# Patient Record
Sex: FEMALE | Race: ASIAN | Employment: PART TIME | URBAN - METROPOLITAN AREA
[De-identification: names, ages, dates, MRNs, and addresses within clinical notes are randomized per-mention and may not be internally consistent; named-entity substitution may affect disease eponyms.]

---

## 2022-07-12 ENCOUNTER — HOSPITAL ENCOUNTER (OUTPATIENT)
Dept: CT IMAGING | Age: 47
Discharge: HOME | End: 2022-07-15
Payer: COMMERCIAL

## 2022-07-12 ENCOUNTER — HOSPITAL ENCOUNTER (OUTPATIENT)
Dept: LAB | Age: 47
Discharge: HOME | End: 2022-07-15

## 2022-07-12 DIAGNOSIS — K63.9 DISEASE OF INTESTINE: ICD-10-CM

## 2022-07-12 PROCEDURE — 88305 TISSUE EXAM BY PATHOLOGIST: CPT

## 2022-07-12 PROCEDURE — 74177 CT ABD & PELVIS W/CONTRAST: CPT

## 2022-07-12 PROCEDURE — 6360000004 HC RX CONTRAST MEDICATION: Performed by: INTERNAL MEDICINE

## 2022-07-12 RX ADMIN — IOPAMIDOL 100 ML: 755 INJECTION, SOLUTION INTRAVENOUS at 11:08

## 2022-07-12 RX ADMIN — DIATRIZOATE MEGLUMINE AND DIATRIZOATE SODIUM 15 ML: 600; 100 SOLUTION ORAL; RECTAL at 11:08

## 2022-07-15 ENCOUNTER — OFFICE VISIT (OUTPATIENT)
Dept: SURGERY | Age: 47
End: 2022-07-15
Payer: COMMERCIAL

## 2022-07-15 VITALS
DIASTOLIC BLOOD PRESSURE: 68 MMHG | HEART RATE: 80 BPM | HEIGHT: 62 IN | OXYGEN SATURATION: 97 % | WEIGHT: 107 LBS | SYSTOLIC BLOOD PRESSURE: 128 MMHG | BODY MASS INDEX: 19.69 KG/M2

## 2022-07-15 DIAGNOSIS — C20 RECTAL CANCER (HCC): Primary | ICD-10-CM

## 2022-07-15 PROCEDURE — 99204 OFFICE O/P NEW MOD 45 MIN: CPT | Performed by: SURGERY

## 2022-07-16 NOTE — PROGRESS NOTES
[facility]  [address] [place] [address], [address] [place] [address]  [phone]     7/15/2022  Patient:  Eneida Bolivar  : 1975    [facility]  Eneida Bolivar is a 51 y.o. female who is seen for newly diagnosed rectal cancer. She developed rectal bleeding over last two months, which prompted her first colonoscopy. A 5 cm half circumferential friable mass was identified at the most proximal valve of [place], biopsy revealed at least carcinoma in situ. CT scan demonstrated no distal metastases. She has EUS scheduled for August. She feels ok overall. Slight weight loss. She has chronic left lower quadrant pain intermittently for years. She is otherwise healthy. Non smoker, non drinker. No cancer in family. Past surgery: C section. History reviewed. No pertinent past medical history. No current outpatient medications on file. No current facility-administered medications for this visit. Facility-Administered Medications Ordered in Other Visits   Medication Dose Route Frequency Provider Last Rate Last Admin    diatrizoate meglumine-sodium (GASTROGRAFIN) 66-10 % solution 15 mL  15 mL Oral ONCE PRN Inder Edmond MD   15 mL at 22 1108     Allergies   Allergen Reactions    Amoxicillin Rash     Past Surgical History:   Procedure Laterality Date     SECTION      COLONOSCOPY       History reviewed. No pertinent family history. Social History     Tobacco Use    Smoking status: Never    Smokeless tobacco: Never   Substance Use Topics    Alcohol use: Not on file        Review of Systems   A comprehensive review of systems was negative except for that written in the HPI.      Physical Exam  /68   Pulse 80   Ht 5' 2\" (1.575 m)   Wt 107 lb (48.5 kg)   SpO2 97%   BMI 19.57 kg/m²      General: Alert, oriented, cooperative, awake patient in no acute distress   Skin:  Warm, moist with good texture   Eyes:   Sclera are clear, extraocular muscles intact  HENT:  Normocephalic; oral mucosa moist, nares patent; neck is supple; trachea midline  Respiratory: Lungs clear to auscultation bilaterally, breathing is non-labored   Chest:  Symmetric throughout one respiratory excursion; no supraclavicular lymphadenopathy  CV:  Regular rate and rhythm, no appreciable murmurs, rubs, gallops  Abdomen: Soft, protuberant but non-distended; bowel sounds are normoactive   Extremities: No cyanosis, clubbing or edema  Neurological: No focal signs  Rectal exam:  JOY the lower edge of rectal mass can be felt, about 7-8 cm to anal verge. Labs:     Colonoscopy:        PATH:  A:  \"RECTAL MASS BIOPSIES\":        FRAGMENTS OF AT LEAST IN SITU ADENOCARCINOMA. SEE COMMENT. COMMENT: BECAUSE BIOPSY FRAGMENTS ARE LIMITED PRIMARIALY TO MUCOSA,   SUBMUCOSAL INFILTRATION CANNOT BE RULED OUT          B:  \"RANDOM COLON BIOPSIES\":        FRAGMENTS OF HISTOLOGICALLY UNREMARKABLE LARGE INTESTINE          C:  \"ASCENDING COLON POLYP\":        FRAGMENT OF MIXED TUBULOVILLOUS ADENOMA       CT:  CT Result (most recent):  CT CHEST ABDOMEN PELVIS W CONTRAST 07/12/2022    Narrative  CT CHEST, ABDOMEN, PELVIS WITH INTRAVENOUS CONTRAST. INDICATION: Abnormal colonoscopy. Left lower quadrant pain, diarrhea and rectal  bleeding. COMPARISON: None. TECHNIQUE:   5 mm axial scans from above the thoracic inlet to the pubic  symphysis following oral and 100 cc intravenous contrast without acute  complication. Intravenous contrast was given to increase the sensitivity to  neoplasia. Radiation dose reduction techniques were used for this study. Our CT  scanners use one or more of the following:  Automated exposure control,  adjustment of the mA and or kV according to patient size, iterative  reconstruction. FINDINGS:  Lungs: No pulmonary nodules. No airspace disease. Airways: Trachea and proximal bronchi grossly patent. Pleura: No effusion or thickening or calcifications.   Lymph Nodes: No enlarged axillary, hilar or mediastinal lymph nodes. Heart: Normal size. Coronaries: No definite calcifications. Aorta: Normal caliber. Esophagus: Thickening of the distal esophagus  Skeletal/Chest Wall: No gross bony lesions. Liver: A 4 mm cyst anterior segment right lobe, axial image 47, otherwise  unremarkable. Gallbladder: No gallstones identified  Bile Ducts: Not dilated. Pancreas: Unremarkable. Spleen: Uniform and normal size. Stomach: Unremarkable. Bowel: There is mural thickening of the rectum, probably a cyst 6 cm in length. Margins are indistinct. Small nodular densities probably represent mesorectal  node. Adrenals: Are normal size. Kidneys/Ureters: Enhance symmetrically. No hydronephrosis. Lymph Nodes: No grossly enlarged retroperitoneal, mesenteric, or pelvic  adenopathy. Vasculature: Aorta is normal caliber. Bones: No gross bony lesions. Other: No ascites. Impression  Mural thickening of the rectum suspicious for neoplasm. Margins are  indistinct. Probable small mesorectal lymph nodes. There is one small cyst in  liver. No pulmonary nodules. Assessment/Plan:   Gardenia White is a 51 y.o. female who has signs and symptoms consistent with sizeable proximal mid rectal cancer without distal metastasis. EUS pending. But this is likely more than T2 lesion, neoadjuvant chemoradiation is probably needed, then followed by surgery. Surgery is briefly discussed, especially the timing after neoadjuvant treatment. I called Dr Lenin Garcia to move up her EUS. Refer to oncology at the same time.      Total time spent with patient including chart review is 45 minutes     Lacy Pastrana MD

## 2022-07-18 ENCOUNTER — PREP FOR PROCEDURE (OUTPATIENT)
Dept: ADMINISTRATIVE | Age: 47
End: 2022-07-18

## 2022-07-18 ENCOUNTER — HOSPITAL ENCOUNTER (OUTPATIENT)
Dept: LAB | Age: 47
Discharge: HOME | End: 2022-07-21
Payer: COMMERCIAL

## 2022-07-18 ENCOUNTER — OFFICE VISIT (OUTPATIENT)
Dept: ONCOLOGY | Age: 47
End: 2022-07-18
Payer: COMMERCIAL

## 2022-07-18 VITALS
RESPIRATION RATE: 16 BRPM | BODY MASS INDEX: 20.03 KG/M2 | SYSTOLIC BLOOD PRESSURE: 126 MMHG | HEART RATE: 81 BPM | HEIGHT: 61 IN | TEMPERATURE: 98.7 F | OXYGEN SATURATION: 99 % | WEIGHT: 106.1 LBS | DIASTOLIC BLOOD PRESSURE: 84 MMHG

## 2022-07-18 DIAGNOSIS — C20 RECTAL CANCER (HCC): ICD-10-CM

## 2022-07-18 DIAGNOSIS — D50.0 IRON DEFICIENCY ANEMIA DUE TO CHRONIC BLOOD LOSS: ICD-10-CM

## 2022-07-18 DIAGNOSIS — C20 RECTAL CANCER (HCC): Primary | ICD-10-CM

## 2022-07-18 LAB
ALBUMIN SERPL-MCNC: 3.9 G/DL (ref 3.5–5)
ALBUMIN/GLOB SERPL: 1.1 {RATIO} (ref 1.2–3.5)
ALP SERPL-CCNC: 48 U/L (ref 50–136)
ALT SERPL-CCNC: 14 U/L (ref 12–65)
ANION GAP SERPL CALC-SCNC: 6 MMOL/L (ref 7–16)
AST SERPL-CCNC: 10 U/L (ref 15–37)
BASOPHILS # BLD: 0 K/UL (ref 0–0.2)
BASOPHILS NFR BLD: 1 % (ref 0–2)
BILIRUB SERPL-MCNC: 0.3 MG/DL (ref 0.2–1.1)
BUN SERPL-MCNC: 13 MG/DL (ref 6–23)
CALCIUM SERPL-MCNC: 9.4 MG/DL (ref 8.3–10.4)
CEA SERPL-MCNC: 8.3 NG/ML (ref 0–3)
CHLORIDE SERPL-SCNC: 106 MMOL/L (ref 98–107)
CO2 SERPL-SCNC: 27 MMOL/L (ref 21–32)
CREAT SERPL-MCNC: 0.6 MG/DL (ref 0.6–1)
DIFFERENTIAL METHOD BLD: NORMAL
EOSINOPHIL # BLD: 0.1 K/UL (ref 0–0.8)
EOSINOPHIL NFR BLD: 1 % (ref 0.5–7.8)
ERYTHROCYTE [DISTWIDTH] IN BLOOD BY AUTOMATED COUNT: 14 % (ref 11.9–14.6)
FERRITIN SERPL-MCNC: 6 NG/ML (ref 8–388)
GLOBULIN SER CALC-MCNC: 3.4 G/DL (ref 2.3–3.5)
GLUCOSE SERPL-MCNC: 115 MG/DL (ref 65–100)
HCT VFR BLD AUTO: 39.7 % (ref 35.8–46.3)
HGB BLD-MCNC: 12.6 G/DL (ref 11.7–15.4)
IMM GRANULOCYTES # BLD AUTO: 0 K/UL (ref 0–0.5)
IMM GRANULOCYTES NFR BLD AUTO: 0 % (ref 0–5)
IRON SATN MFR SERPL: 7 %
IRON SERPL-MCNC: 28 UG/DL (ref 35–150)
LYMPHOCYTES # BLD: 1.5 K/UL (ref 0.5–4.6)
LYMPHOCYTES NFR BLD: 26 % (ref 13–44)
MCH RBC QN AUTO: 27.6 PG (ref 26.1–32.9)
MCHC RBC AUTO-ENTMCNC: 31.7 G/DL (ref 31.4–35)
MCV RBC AUTO: 87.1 FL (ref 79.6–97.8)
MONOCYTES # BLD: 0.4 K/UL (ref 0.1–1.3)
MONOCYTES NFR BLD: 7 % (ref 4–12)
NEUTS SEG # BLD: 3.7 K/UL (ref 1.7–8.2)
NEUTS SEG NFR BLD: 65 % (ref 43–78)
NRBC # BLD: 0 K/UL (ref 0–0.2)
PLATELET # BLD AUTO: 175 K/UL (ref 150–450)
PMV BLD AUTO: 11.5 FL (ref 9.4–12.3)
POTASSIUM SERPL-SCNC: 4 MMOL/L (ref 3.5–5.1)
PROT SERPL-MCNC: 7.3 G/DL (ref 6.3–8.2)
RBC # BLD AUTO: 4.56 M/UL (ref 4.05–5.2)
SODIUM SERPL-SCNC: 139 MMOL/L (ref 136–145)
TIBC SERPL-MCNC: 414 UG/DL (ref 250–450)
WBC # BLD AUTO: 5.6 K/UL (ref 4.3–11.1)

## 2022-07-18 PROCEDURE — 36415 COLL VENOUS BLD VENIPUNCTURE: CPT

## 2022-07-18 PROCEDURE — 80053 COMPREHEN METABOLIC PANEL: CPT

## 2022-07-18 PROCEDURE — 83540 ASSAY OF IRON: CPT

## 2022-07-18 PROCEDURE — 82728 ASSAY OF FERRITIN: CPT

## 2022-07-18 PROCEDURE — 82378 CARCINOEMBRYONIC ANTIGEN: CPT

## 2022-07-18 PROCEDURE — 99205 OFFICE O/P NEW HI 60 MIN: CPT | Performed by: INTERNAL MEDICINE

## 2022-07-18 PROCEDURE — 85025 COMPLETE CBC W/AUTO DIFF WBC: CPT

## 2022-07-18 RX ORDER — SODIUM CHLORIDE 0.9 % (FLUSH) 0.9 %
5-40 SYRINGE (ML) INJECTION EVERY 12 HOURS SCHEDULED
OUTPATIENT
Start: 2022-07-18

## 2022-07-18 RX ORDER — SODIUM CHLORIDE 9 MG/ML
INJECTION, SOLUTION INTRAVENOUS PRN
OUTPATIENT
Start: 2022-07-18

## 2022-07-18 RX ORDER — SODIUM CHLORIDE 0.9 % (FLUSH) 0.9 %
5-40 SYRINGE (ML) INJECTION PRN
OUTPATIENT
Start: 2022-07-18

## 2022-07-18 ASSESSMENT — PATIENT HEALTH QUESTIONNAIRE - PHQ9
2. FEELING DOWN, DEPRESSED OR HOPELESS: 0
SUM OF ALL RESPONSES TO PHQ QUESTIONS 1-9: 0

## 2022-07-18 NOTE — PROGRESS NOTES
[facility]: Office Visit New Patient H/P    Chief Complaint:    Rectal cancer    History of Present Illness:  Reason for Referral: Rectal cancer     Referring Provider:  Cory Lilly MD     Primary Care Provider: John Bai DO     Family History of Cancer/Hematologic Disorders: None noted     Presenting Symptoms: LLQ abdominal pain     Ms. Mari Pulido is a 51 y.o.  female who reports to have never used tobacco products. She denies use of alcohol or drug substances. Her medical history reports as rectal bleeding. Her surgical history reports as  and colonoscopy. On 7/15/22, Ms. Mari Pulido saw Dr Isaac Montoya for her newly diagnosed rectal cancer. She reported to have had rectal bleeding for 2 months which prompted her first colonoscopy. A 5 cm half circumferential friable mass was identified at the most proximal valve of Singh, biopsy revealed at least carcinoma in situ. 2022 CT scan of c/a/p demonstrated no distal metastases. She reported intermittent left lower quadrant abdominal pain for years. She has EUS scheduled for August however Dr Isaac Montoya reached out to Dr Tiffani Bhatia to move up her planned EUS. A referral was placed to hematology for consideration of neoadjuvant chemoradiation. Currently there is no referral to radiation oncology. Her 2022 CBC and CMP reported WNL except for a low creatinine of 0.47.     22 Colonoscopy         2022 Surgical Pathology  DIAGNOSIS  A:  \"RECTAL MASS BIOPSIES\":        FRAGMENTS OF AT LEAST IN SITU ADENOCARCINOMA. SEE COMMENT. COMMENT: BECAUSE BIOPSY FRAGMENTS ARE LIMITED PRIMARIALY TO MUCOSA,   SUBMUCOSAL INFILTRATION CANNOT BE RULED OUT   B:  \"RANDOM COLON BIOPSIES\":        FRAGMENTS OF HISTOLOGICALLY UNREMARKABLE LARGE INTESTINE   C:  \"ASCENDING COLON POLYP\":        FRAGMENT OF MIXED TUBULOVILLOUS ADENOMA     22 CT Chest Abdomen and Pelvis  FINDINGS:  Lungs: No pulmonary nodules. No airspace disease.   Airways: Trachea and proximal bronchi grossly patent. Pleura: No effusion or thickening or calcifications. Lymph Nodes: No enlarged axillary, hilar or mediastinal lymph nodes. Heart: Normal size. Coronaries: No definite calcifications. Aorta: Normal caliber. Esophagus: Thickening of the distal esophagus  Skeletal/Chest Wall: No gross bony lesions. Liver: A 4 mm cyst anterior segment right lobe, axial image 47, otherwise  unremarkable. Gallbladder: No gallstones identified  Bile Ducts: Not dilated. Pancreas: Unremarkable. Spleen: Uniform and normal size. Stomach: Unremarkable. Bowel: There is mural thickening of the rectum, probably a cyst 6 cm in length. Margins are indistinct. Small nodular densities probably represent mesorectal  node. Adrenals: Are normal size. Kidneys/Ureters: Enhance symmetrically. No hydronephrosis. Lymph Nodes: No grossly enlarged retroperitoneal, mesenteric, or pelvic  adenopathy. Vasculature: Aorta is normal caliber. Bones: No gross bony lesions. Other: No ascites. Impression  Mural thickening of the rectum suspicious for neoplasm. Margins are  indistinct. Probable small mesorectal lymph nodes. There is one small cyst in  liver. No pulmonary nodules. Labs           Review of Systems:  Constitutional Denies fever or chills. Denies weight loss or appetite changes. Denies fatigue. Denies anorexia. HEENT Denies trauma, bluring vision, hearing loss, ear pain, nosebleeds, sore throat, neck pain and ear discharge. Skin Denies lesions or rashes. Lungs Denies shortness of breath, cough, sputum production or hemoptysis. Cardiovascular Denies chest pain, palpitations, orthopnea, claudication and leg swelling. Gastrointestinal Blood in stool. Denies nausea, vomiting, bowel changes. Denies abdominal pain.  Denies dysuria, frequency or hesitancy of urination   Neuro Denies headaches, visual changes or ataxia.  Denies dizziness, tingling, tremors, sensory change, speech change, focal weakness and headaches. Hematology Denies nasal/gum bleeding, denies easy bruise   Endo Denies heat/cold intolerance, denies diabetes. MSK Denies back pain, swollen legs, myalgias and falls. Psychiatric/Behavioral Denies depression and substance abuse. The patient is not nervous/anxious. Allergies   Allergen Reactions    Amoxicillin Rash     History reviewed. No pertinent past medical history. Past Surgical History:   Procedure Laterality Date     SECTION      COLONOSCOPY       History reviewed. No pertinent family history. Social History     Socioeconomic History    Marital status:      Spouse name: Not on file    Number of children: Not on file    Years of education: Not on file    Highest education level: Not on file   Occupational History    Not on file   Tobacco Use    Smoking status: Never    Smokeless tobacco: Never   Substance and Sexual Activity    Alcohol use: Not on file    Drug use: Not on file    Sexual activity: Not on file   Other Topics Concern    Not on file   Social History Narrative    Not on file     Social Determinants of Health     Financial Resource Strain: Not on file   Food Insecurity: Not on file   Transportation Needs: Not on file   Physical Activity: Not on file   Stress: Not on file   Social Connections: Not on file   Intimate Partner Violence: Not on file   Housing Stability: Not on file     No current outpatient medications on file. No current facility-administered medications for this visit. OBJECTIVE:  /84   Pulse 81   Temp 98.7 °F (37.1 °C)   Resp 16   Ht 5' 1\" (1.549 m)   Wt 106 lb 1.6 oz (48.1 kg)   SpO2 99%   BMI 20.05 kg/m²     Physical Exam:  Constitutional: Oriented to person, place, and time. Well-developed and well-nourished. HEENT: Normocephalic and atraumatic. Oropharynx is clear and moist.   Conjunctivae and EOM are normal. Pupils are equal, round, and reactive to light. No scleral icterus. Neck supple. No JVD present.   No tracheal deviation present. No thyromegaly present. Lymph node No palpable submandibular, cervical, supraclavicular, axillary and inguinal lymph nodes. Skin Warm and dry. No bruising and no rash noted. No erythema. No pallor. Respiratory Effort normal and breath sounds normal.  No respiratory distress. No wheezes. No rales. No tenderness. CVS Normal rate, regular rhythm and normal heart sounds. Exam reveals no gallop, no friction and no rub. No murmur heard. Abdomen Soft. Bowel sounds are normal. Exhibits no distension. There is no tenderness. There is no rebound and no guarding. Neuro Normal reflexes. No cranial nerve deficit. Exhibits normal muscle tone, 5 of 5 strength of all extremities. MSK Normal range of motion in general.  No edema and no tenderness.    Psych Normal mood, affect, behavior, judgment and thought content      Labs:  Recent Results (from the past 24 hour(s))   CBC with Auto Differential    Collection Time: 07/18/22 11:18 AM   Result Value Ref Range    WBC 5.6 4.3 - 11.1 K/uL    RBC 4.56 4.05 - 5.2 M/uL    Hemoglobin 12.6 11.7 - 15.4 g/dL    Hematocrit 39.7 35.8 - 46.3 %    MCV 87.1 79.6 - 97.8 FL    MCH 27.6 26.1 - 32.9 PG    MCHC 31.7 31.4 - 35.0 g/dL    RDW 14.0 11.9 - 14.6 %    Platelets 333 753 - 125 K/uL    MPV 11.5 9.4 - 12.3 FL    nRBC 0.00 0.0 - 0.2 K/uL    Differential Type AUTOMATED      Seg Neutrophils 65 43 - 78 %    Lymphocytes 26 13 - 44 %    Monocytes 7 4.0 - 12.0 %    Eosinophils % 1 0.5 - 7.8 %    Basophils 1 0.0 - 2.0 %    Immature Granulocytes 0 0.0 - 5.0 %    Segs Absolute 3.7 1.7 - 8.2 K/UL    Absolute Lymph # 1.5 0.5 - 4.6 K/UL    Absolute Mono # 0.4 0.1 - 1.3 K/UL    Absolute Eos # 0.1 0.0 - 0.8 K/UL    Basophils Absolute 0.0 0.0 - 0.2 K/UL    Absolute Immature Granulocyte 0.0 0.0 - 0.5 K/UL   CEA    Collection Time: 07/18/22 11:18 AM   Result Value Ref Range    CEA 8.3 (H) 0.0 - 3.0 ng/mL   Comprehensive Metabolic Panel    Collection Time: 07/18/22 11:18 AM   Result Value Ref Range    Sodium 139 136 - 145 mmol/L    Potassium 4.0 3.5 - 5.1 mmol/L    Chloride 106 98 - 107 mmol/L    CO2 27 21 - 32 mmol/L    Anion Gap 6 (L) 7 - 16 mmol/L    Glucose 115 (H) 65 - 100 mg/dL    BUN 13 6 - 23 MG/DL    CREATININE 0.60 0.6 - 1.0 MG/DL    GFR African American >60 >60 ml/min/1.73m2    GFR Non- >60 >60 ml/min/1.73m2    Calcium 9.4 8.3 - 10.4 MG/DL    Total Bilirubin 0.3 0.2 - 1.1 MG/DL    ALT 14 12 - 65 U/L    AST 10 (L) 15 - 37 U/L    Alk Phosphatase 48 (L) 50 - 136 U/L    Total Protein 7.3 6.3 - 8.2 g/dL    Albumin 3.9 3.5 - 5.0 g/dL    Globulin 3.4 2.3 - 3.5 g/dL    Albumin/Globulin Ratio 1.1 (L) 1.2 - 3.5     Ferritin    Collection Time: 07/18/22 11:18 AM   Result Value Ref Range    Ferritin 6 (L) 8 - 388 NG/ML   Transferrin Saturation    Collection Time: 07/18/22 11:18 AM   Result Value Ref Range    Iron 28 (L) 35 - 150 ug/dL    TIBC 414 250 - 450 ug/dL    TRANSFERRIN SATURATION 7 (L) >20 %       Imaging:  No results found for this or any previous visit. ASSESSMENT/PLAN:   Diagnosis Orders   1. Rectal cancer (HCC)  Ferritin    Transferrin Saturation    PET CT LIMITED IMAGING INITIAL    Pathology Specimen To Lab      2. Iron deficiency anemia due to chronic blood loss          51 y.o. F urgently consulted for rectal carcinoma. She had good general baseline health, developed frequency of bowel movement and blood in stool for 2 months, colonoscopy showed low rectal adenocarcinoma and urgently consulted to oncology, discussed carcinogenesis and staging, urgent arrangement of EUS pending, baseline staging PET, discussed most likely need neoadjuvant therapy and will determine JENNIFER in the manner of [facility]dig[facility] 23 versus neoadjuvant chemoradiation alone if T3, arrange iron infusion, return next week to follow results and further plan. Call as needed. All questions are answered to their satisfaction.  They will call for further questions and concerns. ECOG PERFORMANCE STATUS - 0-Fully active, able to carry on all pre-disease performance without restriction. Pain - /10. None/Minimal pain - not affecting QOL     Fatigue - No flowsheet data found. Distress - No flowsheet data found. Total time independently spent on today's visit was 60min. This time included: face-to-face time evaluating the patient as well as additional non-face-to-face time spent on: Preparing to see the patient by obtaining and reviewing previous test results, records and medical history, Performing a medically appropriate history and exam and documenting relevant clinical information for this visit, Counseling and educating patient and family, Ordering medications, Communicating with other health care professionals and Referring patient to another health care provider. Elements of this note have been dictated via voice recognition software. Text and phrases may be limited by the accuracy and autoconversion of the software. The chart has been reviewed, but errors may still be present. Clair Vinson M.D.   [address]  Office : [phone]  Fax : [phone]

## 2022-07-18 NOTE — PATIENT INSTRUCTIONS
Patient Instructions from Today's Visit    Reason for Visit:  New patient visit for rectal cancer     Diagnosis Information:  [email or web address] net/about-us/asco-answers-patient-education-materials/fpox-lsssvza-tbvh-sheets    Plan:  -We will also order a PET CT which will be performed here  -Follow up with EUS on Thursday  -Standard of care is chemotherapy along with radiation, then surgery with probable chemotherapy after    Follow Up:  -as scheduled    Recent Lab Results:  Hospital Outpatient Visit on 07/18/2022   Component Date Value Ref Range Status    WBC 07/18/2022 5.6  4.3 - 11.1 K/uL Final    RBC 07/18/2022 4.56  4.05 - 5.2 M/uL Final    Hemoglobin 07/18/2022 12.6  11.7 - 15.4 g/dL Final    Hematocrit 07/18/2022 39.7  35.8 - 46.3 % Final    MCV 07/18/2022 87.1  79.6 - 97.8 FL Final    MCH 07/18/2022 27.6  26.1 - 32.9 PG Final    MCHC 07/18/2022 31.7  31.4 - 35.0 g/dL Final    RDW 07/18/2022 14.0  11.9 - 14.6 % Final    Platelets 59/95/8203 175  150 - 450 K/uL Final    MPV 07/18/2022 11.5  9.4 - 12.3 FL Final    nRBC 07/18/2022 0.00  0.0 - 0.2 K/uL Final    **Note: Absolute NRBC parameter is now reported with Hemogram**    Differential Type 07/18/2022 AUTOMATED    Final    Seg Neutrophils 07/18/2022 65  43 - 78 % Final    Lymphocytes 07/18/2022 26  13 - 44 % Final    Monocytes 07/18/2022 7  4.0 - 12.0 % Final    Eosinophils % 07/18/2022 1  0.5 - 7.8 % Final    Basophils 07/18/2022 1  0.0 - 2.0 % Final    Immature Granulocytes 07/18/2022 0  0.0 - 5.0 % Final    Segs Absolute 07/18/2022 3.7  1.7 - 8.2 K/UL Final    Absolute Lymph # 07/18/2022 1.5  0.5 - 4.6 K/UL Final    Absolute Mono # 07/18/2022 0.4  0.1 - 1.3 K/UL Final    Absolute Eos # 07/18/2022 0.1  0.0 - 0.8 K/UL Final    Basophils Absolute 07/18/2022 0.0  0.0 - 0.2 K/UL Final    Absolute Immature Granulocyte 07/18/2022 0.0  0.0 - 0.5 K/UL Final    CEA 07/18/2022 8.3 (A) 0.0 - 3.0 ng/mL Final    Comment: Nonsmoker:  <3.0 ng/mL  Smoker:     <5.0 ng/mL  [employer]. Patient's results of tumor marker testing may not be comparable to labs using different manufacturers/methods. Sodium 07/18/2022 139  136 - 145 mmol/L Final    Potassium 07/18/2022 4.0  3.5 - 5.1 mmol/L Final    Chloride 07/18/2022 106  98 - 107 mmol/L Final    CO2 07/18/2022 27  21 - 32 mmol/L Final    Anion Gap 07/18/2022 6 (A) 7 - 16 mmol/L Final    Glucose 07/18/2022 115 (A) 65 - 100 mg/dL Final    BUN 07/18/2022 13  6 - 23 MG/DL Final    CREATININE 07/18/2022 0.60  0.6 - 1.0 MG/DL Final    GFR  07/18/2022 >60  >60 ml/min/1.73m2 Final    GFR Non- 07/18/2022 >60  >60 ml/min/1.73m2 Final    Comment:      Estimated GFR is calculated using the Modification of Diet in Renal Disease (MDRD) Study equation, reported for both  Americans (GFRAA) and non- Americans (GFRNA), and normalized to 1.73m2 body surface area. The physician must decide which value applies to the patient. The MDRD study equation should only be used in individuals age 25 or older. It has not been validated for the following: pregnant women, patients with serious comorbid conditions,or on certain medications, or persons with extremes of body size, muscle mass, or nutritional status.       Calcium 07/18/2022 9.4  8.3 - 10.4 MG/DL Final    Total Bilirubin 07/18/2022 0.3  0.2 - 1.1 MG/DL Final    ALT 07/18/2022 14  12 - 65 U/L Final    AST 07/18/2022 10 (A) 15 - 37 U/L Final    Alk Phosphatase 07/18/2022 48 (A) 50 - 136 U/L Final    Total Protein 07/18/2022 7.3  6.3 - 8.2 g/dL Final    Albumin 07/18/2022 3.9  3.5 - 5.0 g/dL Final    Globulin 07/18/2022 3.4  2.3 - 3.5 g/dL Final    Albumin/Globulin Ratio 07/18/2022 1.1 (A) 1.2 - 3.5   Final       Treatment Summary has been discussed and given to patient: N/A        -------------------------------------------------------------------------------------------------------------------  Please call our office at [phone] if you have any  of the following symptoms:   Fever of 100.5 or greater  Chills  Shortness of breath  Swelling or pain in one leg    After office hours an answering service is available and will contact a provider for emergencies or if you are experiencing any of the above symptoms. Patient did not express an interest in My Chart. My Chart log in information explained on the after visit summary printout at the [facility] HTP desk. Stephan Ambrocio RN     ASCO ANSWERS is a collection of oncologist-approved patient education materials developed by the American Society of Clinical Oncology (ASCO) for people with cancer and their caregivers. Colorectal Cancer    ILLUSTRATION BY [employer]. © 2004 AMERICAN SOCIETY OF CLINICAL ONCOLOGY. What is colorectal cancer? Colorectal cancer is a disease in which healthy cells in the lining of the colon or rectum change and grow out of control. This cell growth can form a noncancerous polyp that could become a cancerous tumor. Most colon and rectal cancers are a type of tumor called adenocarcinoma. Colorectal cancer is the fourth most common type of cancer diagnosed in the [place]. What is the function of the colon and rectum? The colon and rectum make up the large intestine, which plays an important role in the bodys ability to process waste. The large intestine turns food digested by the stomach and small intestine into fecal waste, or stool, that leaves the body through the anus. What does stage mean? The stage is a way of describing where the cancer is located, if or where it has spread, and whether it is affecting other parts of the body. There are 5 stages for colorectal cancer: stage 0 (zero) and stages I through IV (1 though 4). Find more descriptions and illustrations of these stages at www.cancer. net/colorectal.     How is colorectal cancer treated?    The treatment of colorectal cancer depends on the location and extent of the tumor, whether the cancer has spread, and the persons overall health. For cancers that start in the colon, surgery is typically the first treatment. For cancers that start in the rectum (the last 4 to 5 inches of the large intestine), surgery may be the first treatment or chemotherapy and/or radiation may be given before surgery. Additional treatment may be given to lower the risk of the cancer returning or to treat cancer that has spread. This may include radiation therapy and chemotherapy. If the cancer has spread outside the colon and rectum, then chemotherapy or targeted therapy will be used. Occasionally, surgery may also be used to remove cancer that has spread past the colon and rectum. When making treatment decisions, people may also consider a clinical trial; talk with your doctor about all treatment options. The side effects of colorectal cancer treatment can often be prevented or managed with the help of your health care team. This is called palliative care and is an important part of the overall treatment plan. How can I cope with colorectal cancer? Absorbing the news of a cancer diagnosis and communicating with your health care team are key parts of the coping process. Seeking support, organizing your health information, making sure all of your questions are answered, and participating in the decision-making process are other steps. Talk with your health care team about any concerns. Understanding your emotions and those of people close to you can be helpful in managing the diagnosis, treatment, and healing process. MADE AVAILABLE [facility] OF CLINICAL ONCOLOGY   [place], [address] [place] [address], [address] [place] [address]  Toll Free: [phone]  Phone: [phone] www. [facility]. [employer])  www.conquer. org © 2017 American Society of Clinical Oncology.  For permissions information, contact Patricia@"[employer]     Questions to ask the health care team   Regular communication is important in making informed decisions about your health care. Consider asking your health care team the following questions:   What type of colon or rectal cancer do I have? Where exactly is the cancer located? Can you explain my pathology report (laboratory test results) to me? What stage is the colon or rectal cancer? What does this mean? Has my tumor been tested for microsatellite instability and other molecular features? Would you explain my treatment options? What clinical trials are available for me? Where are they located, and how do I  find out more about them? What treatment plan do you recommend? Why? What is the goal of each treatment? Is it to eliminate the cancer, help me feel  better, or both? Who will be part of my treatment team, and what does each member do? Do  they have experience treating colorectal cancer? How will this treatment affect my daily life? Will I be able to work, exercise, and  perform my usual activities? Will this treatment affect my ability to become pregnant or have children? What long-term side effects may be associated with my cancer treatment? If Im worried about managing the costs of cancer care, who can help me? Where can I find emotional support for me and my family? Whom should I call with questions or problems? Find more questions to ask the health care team at [facility]. net/colorectal. For a digital list of questions, download Cancer. Nets free mobile nilay at www.cancer. net/nilay. The ideas and opinions expressed here do not necessarily reflect the opinions of the American Society of Clinical Oncology (ASCO) or [employer]. The information in this fact sheet is not intended as medical or legal advice, or as a substitute for consultation with a physician or other licensed health care provider.  Patients with health care-related questions should call or see their physician or other health care provider promptly and should not disregard professional medical advice, or delay seeking it, because of information encountered here. The mention of any product, service, or treatment in this fact sheet should not be construed as an ASCO endorsement. ASCO is not responsible for any injury or damage to persons or property arising out of or related to any use of ASCOs patient education materials, or to any errors or omissions. To order more printed copies, please call [phone] or visit www.cancer. net/estore. WORDS TO KNOW     Adenoma: A specific type of polyp that is likely to become cancerous     Benign: A tumor that is not cancerous     Biopsy: Removal of a tissue sample that is then examined under a microscope to check for cancer cells     Chemotherapy: The use of drugs to destroy cancer cells     Colonoscopy: A test that allows doctors to look inside the colon and rectum for polyps or cancer using a colonoscope (lighted tube)     Lymph node: A tiny, bean-shaped organ that fights infection     Malignant: A tumor that is cancerous     Metastasis: The spread of cancer from where it began to another part of the body     Oncologist: A doctor who specializes in treating cancer     Polyp: A growth in the colon or rectum that is a risk factor for colorectal cancer     Prognosis: Chance of recovery     Radiation therapy:  The use of high-energy x-rays to destroy cancer cells     Targeted therapy: Treatment that targets the cancers specific genes, proteins, or the tissue environment that contributes to cancer growth and survival   [id number]

## 2022-07-18 NOTE — PROGRESS NOTES
New Patient Abstract    Reason for Referral: Rectal cancer    Referring Provider:  Yenifer Paul MD    Primary Care Provider: Veronica Negrete DO    Family History of Cancer/Hematologic Disorders: None noted    Presenting Symptoms: LLQ abdominal pain    Narrative with recent with Results/Procedures/Biopsies and Dates completed:   Ms. Agnieszka Crouch is a 79-year-old  female who reports to have never used tobacco products. She denies use of alcohol or drug substances. Her medical history reports as rectal bleeding. Her surgical history reports as  and colonoscopy. On 7/15/22, Ms. Agnieszka Crouch saw Dr Darel Peabody for her newly diagnosed rectal cancer. She reported to have had rectal bleeding for 2 months which prompted her first colonoscopy. A 5 cm half circumferential friable mass was identified at the most proximal valve of Singh, biopsy revealed at least carcinoma in situ. 2022 CT scan of c/a/p demonstrated no distal metastases. She reported intermittent left lower quadrant abdominal pain for years. She has EUS scheduled for August however Dr Darel Peabody reached out to Dr Shital Darling to move up her planned EUS. A referral was placed to hematology for consideration of neoadjuvant chemoradiation. Currently there is no referral to radiation oncology. Her 2022 CBC and CMP reported WNL except for a low creatinine of 0.47.    22 Colonoscopy        2022 Surgical Pathology  DIAGNOSIS   A:  \"RECTAL MASS BIOPSIES\":        FRAGMENTS OF AT LEAST IN SITU ADENOCARCINOMA. SEE COMMENT. COMMENT: BECAUSE BIOPSY FRAGMENTS ARE LIMITED PRIMARIALY TO MUCOSA,   SUBMUCOSAL INFILTRATION CANNOT BE RULED OUT   B:  \"RANDOM COLON BIOPSIES\":        FRAGMENTS OF HISTOLOGICALLY UNREMARKABLE LARGE INTESTINE   C:  \"ASCENDING COLON POLYP\":        FRAGMENT OF MIXED TUBULOVILLOUS ADENOMA    22 CT Chest Abdomen and Pelvis  FINDINGS:  Lungs: No pulmonary nodules. No airspace disease. Airways: Trachea and proximal bronchi grossly patent.   Pleura: No effusion or thickening or calcifications. Lymph Nodes: No enlarged axillary, hilar or mediastinal lymph nodes. Heart: Normal size. Coronaries: No definite calcifications. Aorta: Normal caliber. Esophagus: Thickening of the distal esophagus  Skeletal/Chest Wall: No gross bony lesions. Liver: A 4 mm cyst anterior segment right lobe, axial image 47, otherwise  unremarkable. Gallbladder: No gallstones identified  Bile Ducts: Not dilated. Pancreas: Unremarkable. Spleen: Uniform and normal size. Stomach: Unremarkable. Bowel: There is mural thickening of the rectum, probably a cyst 6 cm in length. Margins are indistinct. Small nodular densities probably represent mesorectal  node. Adrenals: Are normal size. Kidneys/Ureters: Enhance symmetrically. No hydronephrosis. Lymph Nodes: No grossly enlarged retroperitoneal, mesenteric, or pelvic  adenopathy. Vasculature: Aorta is normal caliber. Bones: No gross bony lesions. Other: No ascites. Impression  Mural thickening of the rectum suspicious for neoplasm. Margins are  indistinct. Probable small mesorectal lymph nodes. There is one small cyst in  liver. No pulmonary nodules.      Labs        Notes from Referring Provider: n/a    Other Pertinent Information: n/a    Presented at Tumor Board: no

## 2022-07-19 ENCOUNTER — CLINICAL DOCUMENTATION (OUTPATIENT)
Dept: CASE MANAGEMENT | Age: 47
End: 2022-07-19

## 2022-07-19 DIAGNOSIS — C20 RECTAL CANCER (HCC): Primary | ICD-10-CM

## 2022-07-19 NOTE — PROGRESS NOTES
I met pt on 7-18-22 with Dr Betsy Alvarez with new diagnosis rectal cancer. Pt will have EUS on 7-21-22 with Dr Kvng Mccord and Dr Mary Griffith has ordered a PET scan for further diagnosis and establish plan of care. Dr Betsy Alvarez spoke in [facility]) with patient. Patient will return after PET and EUS.

## 2022-07-19 NOTE — PROGRESS NOTES
Patient verified name, , and procedure. Phone assess. Completed with pt's spouse-- per pt and her spouse -- she speaks english but has a hard time over the phone-- pt request I do phone assess at this time with her spouse. Type: 1a; abbreviated assessment per anesthesia guidelines    Labs per anesthesia: [address] pt that they will be notified the day before their procedure by the GI Lab for time of arrival if their procedure is [place] and Pre-op for [place] cases. Arrival times should be called by 5 pm. If no phone is received the patient should contact their respective hospital. The GI lab telephone number is [phone] and ES Pre-op is [phone]. Follow diet and prep instructions per office including NPO status. If patient has NOT received instructions from office patient is advised to call surgeon office, verbalizes understanding. Bath or shower the night before and the am of surgery with non-mositurizing soap. No lotions, oils, powders, cologne on skin. No make up, eye make up or jewelry. Wear loose fitting comfortable, clean clothing. Must have adult present in building the entire time . Medications for the day of procedure none, patient to hold all vitamins and supplements    The following discharge instructions reviewed with patient: medication given during procedure may cause drowsiness for several hours, therefore, do not drive or operate machinery for remainder of the day. You may not drink alcohol on the day of your procedure, please resume regular diet and activity unless otherwise directed. You may experience abdominal distention for several hours that is relieved by the passage of gas. Contact your physician if you have any of the following: fever or chills, severe abdominal pain or excessive amount of bleeding or a large amount when having a bowel movement.  Occasional specks of blood with bowel movement would not be unusual.

## 2022-07-20 NOTE — PROGRESS NOTES
Procedure confirmed with patients . Aware of [phone] arrival time, where to arrive and  policy. Covid screening completed. No questions at this time.

## 2022-07-21 ENCOUNTER — ANESTHESIA (OUTPATIENT)
Dept: ENDOSCOPY | Age: 47
End: 2022-07-21
Payer: COMMERCIAL

## 2022-07-21 ENCOUNTER — HOSPITAL ENCOUNTER (OUTPATIENT)
Age: 47
Setting detail: OUTPATIENT SURGERY
Discharge: HOME | End: 2022-07-21
Attending: INTERNAL MEDICINE | Admitting: INTERNAL MEDICINE
Payer: COMMERCIAL

## 2022-07-21 ENCOUNTER — ANESTHESIA EVENT (OUTPATIENT)
Dept: ENDOSCOPY | Age: 47
End: 2022-07-21
Payer: COMMERCIAL

## 2022-07-21 VITALS
WEIGHT: 107 LBS | HEART RATE: 74 BPM | OXYGEN SATURATION: 99 % | TEMPERATURE: 97.3 F | BODY MASS INDEX: 20.2 KG/M2 | SYSTOLIC BLOOD PRESSURE: 111 MMHG | DIASTOLIC BLOOD PRESSURE: 55 MMHG | HEIGHT: 61 IN | RESPIRATION RATE: 16 BRPM

## 2022-07-21 LAB — HCG UR QL: NEGATIVE

## 2022-07-21 PROCEDURE — 3609020100 HC COLONOSCOPY W/EUS FNA: Performed by: INTERNAL MEDICINE

## 2022-07-21 PROCEDURE — 2709999900 HC NON-CHARGEABLE SUPPLY: Performed by: INTERNAL MEDICINE

## 2022-07-21 PROCEDURE — 81025 URINE PREGNANCY TEST: CPT

## 2022-07-21 PROCEDURE — 2580000003 HC RX 258: Performed by: ANESTHESIOLOGY

## 2022-07-21 PROCEDURE — 7100000010 HC PHASE II RECOVERY - FIRST 15 MIN: Performed by: INTERNAL MEDICINE

## 2022-07-21 PROCEDURE — 6360000002 HC RX W HCPCS

## 2022-07-21 PROCEDURE — 3700000001 HC ADD 15 MINUTES (ANESTHESIA): Performed by: INTERNAL MEDICINE

## 2022-07-21 PROCEDURE — 7100000011 HC PHASE II RECOVERY - ADDTL 15 MIN: Performed by: INTERNAL MEDICINE

## 2022-07-21 PROCEDURE — 2500000003 HC RX 250 WO HCPCS

## 2022-07-21 PROCEDURE — 3700000000 HC ANESTHESIA ATTENDED CARE: Performed by: INTERNAL MEDICINE

## 2022-07-21 RX ORDER — SODIUM CHLORIDE 9 MG/ML
INJECTION, SOLUTION INTRAVENOUS PRN
Status: DISCONTINUED | OUTPATIENT
Start: 2022-07-21 | End: 2022-07-21 | Stop reason: HOSPADM

## 2022-07-21 RX ORDER — SODIUM CHLORIDE 0.9 % (FLUSH) 0.9 %
5-40 SYRINGE (ML) INJECTION EVERY 12 HOURS SCHEDULED
Status: DISCONTINUED | OUTPATIENT
Start: 2022-07-21 | End: 2022-07-21 | Stop reason: HOSPADM

## 2022-07-21 RX ORDER — ONDANSETRON 2 MG/ML
4 INJECTION INTRAMUSCULAR; INTRAVENOUS
Status: DISCONTINUED | OUTPATIENT
Start: 2022-07-21 | End: 2022-07-21 | Stop reason: HOSPADM

## 2022-07-21 RX ORDER — HALOPERIDOL 5 MG/ML
1 INJECTION INTRAMUSCULAR
Status: DISCONTINUED | OUTPATIENT
Start: 2022-07-21 | End: 2022-07-21 | Stop reason: HOSPADM

## 2022-07-21 RX ORDER — PROPOFOL 10 MG/ML
INJECTION, EMULSION INTRAVENOUS CONTINUOUS PRN
Status: DISCONTINUED | OUTPATIENT
Start: 2022-07-21 | End: 2022-07-21 | Stop reason: SDUPTHER

## 2022-07-21 RX ORDER — SODIUM CHLORIDE 0.9 % (FLUSH) 0.9 %
5-40 SYRINGE (ML) INJECTION PRN
Status: DISCONTINUED | OUTPATIENT
Start: 2022-07-21 | End: 2022-07-21 | Stop reason: HOSPADM

## 2022-07-21 RX ORDER — LIDOCAINE HYDROCHLORIDE 20 MG/ML
INJECTION, SOLUTION EPIDURAL; INFILTRATION; INTRACAUDAL; PERINEURAL PRN
Status: DISCONTINUED | OUTPATIENT
Start: 2022-07-21 | End: 2022-07-21 | Stop reason: SDUPTHER

## 2022-07-21 RX ORDER — SODIUM CHLORIDE, SODIUM LACTATE, POTASSIUM CHLORIDE, CALCIUM CHLORIDE 600; 310; 30; 20 MG/100ML; MG/100ML; MG/100ML; MG/100ML
INJECTION, SOLUTION INTRAVENOUS CONTINUOUS
Status: DISCONTINUED | OUTPATIENT
Start: 2022-07-21 | End: 2022-07-21 | Stop reason: HOSPADM

## 2022-07-21 RX ORDER — LIDOCAINE HYDROCHLORIDE 10 MG/ML
1 INJECTION, SOLUTION INFILTRATION; PERINEURAL
Status: DISCONTINUED | OUTPATIENT
Start: 2022-07-21 | End: 2022-07-21 | Stop reason: HOSPADM

## 2022-07-21 RX ORDER — PROPOFOL 10 MG/ML
INJECTION, EMULSION INTRAVENOUS PRN
Status: DISCONTINUED | OUTPATIENT
Start: 2022-07-21 | End: 2022-07-21 | Stop reason: SDUPTHER

## 2022-07-21 RX ADMIN — PROPOFOL 40 MG: 10 INJECTION, EMULSION INTRAVENOUS at 12:39

## 2022-07-21 RX ADMIN — SODIUM CHLORIDE, POTASSIUM CHLORIDE, SODIUM LACTATE AND CALCIUM CHLORIDE: 600; 310; 30; 20 INJECTION, SOLUTION INTRAVENOUS at 12:12

## 2022-07-21 RX ADMIN — PROPOFOL 140 MCG/KG/MIN: 10 INJECTION, EMULSION INTRAVENOUS at 12:39

## 2022-07-21 RX ADMIN — LIDOCAINE HYDROCHLORIDE 40 MG: 20 INJECTION, SOLUTION EPIDURAL; INFILTRATION; INTRACAUDAL; PERINEURAL at 12:38

## 2022-07-21 ASSESSMENT — PAIN - FUNCTIONAL ASSESSMENT: PAIN_FUNCTIONAL_ASSESSMENT: NONE - DENIES PAIN

## 2022-07-21 NOTE — OP NOTE
Procedure:  Endoscopic ultrasound with Doppler of the Rectum    Date of Procedure:  7/21/2022    Patient:  Rocio Giles     1975    Indication:  Locoregional staging of rectal cancer     Sedation:  MAC    Pre-Procedure Physical Exam:    Mental status:  alert and oriented  Airway:  normal oropharyngeal airway and neck mobility  CV:  regular rate and rhythm  Respiratory:  clear to auscultation    Procedure:  A History and Physical has been performed, and patient medication allergies have been reviewed. Risks of perforation, hemorrhage, adverse drug reaction, and aspiration were discussed. Informed consent was obtained for the procedure, including sedation. The patient was placed in the left lateral decubitus position. The heart rate, oxygen saturations, blood pressure, and response to care were monitored throughout the procedure. The radial echoendoscope was passed through the anus and advanced under direct vision to 20 cm from the anal verge. As the scope was slowly withdrawn, detailed endoscopic images were obtained from the rectal wall and surrounding structures. The patient tolerated the procedure well. Findings:     ENDOSCOPIC FINDINGS:  Limited views of the mucosa with the echoendoscope reveals a rectal mass. There are other no gross abnormalities. RECTUM:  The balloon was insufflated to improve acoustic coupling. At a distance 8 to 13 cm from the anal verge, there is up to 60% circumferential wall thickening. Maximal tumor thickness is 18.4 mm. There is a hypoechoic, heterogeneous mass that involves all wall layers including the mucosa, submucosa and muscularis propria. There is tumor penetration through the muscularis propria into the pericolorectal tissues by several small tumor pseudopodia. There are no pathologically enlarged rectal lymph nodes. The largest measures 6 mm in long-axis diameter.  Views of the left iliac vessels, bladder, internal and external anal sphincters are unremarkable. Specimen:  No    Estimated Blood Loss:  None    Implant:  None           Impression:    1. Rectal cancer. This is staged T3N0Mx based on endoscopic ultrasound criteria. Plan:  1. Resume diet and current medications. 2. Follow up with Surgery and Oncology as currently scheduled.     Signed:  Stephanie Jeffery MD  7/21/2022  12:58 PM

## 2022-07-21 NOTE — PROGRESS NOTES
Pt discharged home with  ,  driving, VSS, instructions reviewed with  and patient (refused ) , understanding verbalized. All belongings sent home with Pt. Pt transported out via wheelchair by endo staff.

## 2022-07-21 NOTE — ANESTHESIA POSTPROCEDURE EVALUATION
Department of Anesthesiology  Postprocedure Note    Patient: Melvi Crandall  MRN: [id number]  YOB: 1975  Date of evaluation: 7/21/2022      Procedure Summary     Date: 07/21/22 Room / Location: [facility] ENDO 05 / [facility] ENDOSCOPY    Anesthesia Start: 1232 Anesthesia Stop: 1259    Procedure: COLONOSCOPY ULTRASOUND/ BMI 19 Diagnosis:       Benign neoplasm of ascending colon      Disease of intestine      (Benign neoplasm of ascending colon [D12.2])    Surgeons: Gris Briceno MD Responsible Provider: Yee Sosa MD    Anesthesia Type: TIVA ASA Status: 2          Anesthesia Type: No value filed.     Víctor Phase I: Víctor Score: 10    Víctor Phase II: Víctor Score: 10      Anesthesia Post Evaluation    Patient location during evaluation: PACU  Patient participation: complete - patient participated  Level of consciousness: awake and alert  Airway patency: patent  Nausea & Vomiting: no nausea and no vomiting  Complications: no  Cardiovascular status: hemodynamically stable  Respiratory status: acceptable, nonlabored ventilation and spontaneous ventilation  Hydration status: euvolemic  Comments: BP (!) 118/56   Pulse 80   Temp 97.3 °F (36.3 °C) (Skin)   Resp 16   Ht 5' 1\" (1.549 m)   Wt 107 lb (48.5 kg)   LMP 06/14/2022 (Exact Date)   SpO2 98%   BMI 20.22 kg/m²     Multimodal analgesia pain management approach

## 2022-07-21 NOTE — ANESTHESIA PRE PROCEDURE
use: Yes     Comment: rare                                Counseling given: Not Answered      Vital Signs (Current):   Vitals:    07/19/22 0952   Weight: 106 lb (48.1 kg)   Height: 5' 1\" (1.549 m)                                              BP Readings from Last 3 Encounters:   07/18/22 126/84   07/15/22 128/68       NPO Status: Time of last liquid consumption: 2000                        Time of last solid consumption: 2000                        Date of last liquid consumption: 07/20/22                        Date of last solid food consumption: 07/20/22    BMI:   Wt Readings from Last 3 Encounters:   07/19/22 106 lb (48.1 kg)   07/18/22 106 lb 1.6 oz (48.1 kg)   07/15/22 107 lb (48.5 kg)     Body mass index is 20.03 kg/m². CBC:   Lab Results   Component Value Date/Time    WBC 5.6 07/18/2022 11:18 AM    RBC 4.56 07/18/2022 11:18 AM    HGB 12.6 07/18/2022 11:18 AM    HCT 39.7 07/18/2022 11:18 AM    MCV 87.1 07/18/2022 11:18 AM    RDW 14.0 07/18/2022 11:18 AM     07/18/2022 11:18 AM       CMP:   Lab Results   Component Value Date/Time     07/18/2022 11:18 AM    K 4.0 07/18/2022 11:18 AM     07/18/2022 11:18 AM    CO2 27 07/18/2022 11:18 AM    BUN 13 07/18/2022 11:18 AM    CREATININE 0.60 07/18/2022 11:18 AM    GFRAA >60 07/18/2022 11:18 AM    LABGLOM >60 07/18/2022 11:18 AM    GLUCOSE 115 07/18/2022 11:18 AM    PROT 7.3 07/18/2022 11:18 AM    CALCIUM 9.4 07/18/2022 11:18 AM    BILITOT 0.3 07/18/2022 11:18 AM    ALKPHOS 48 07/18/2022 11:18 AM    AST 10 07/18/2022 11:18 AM    ALT 14 07/18/2022 11:18 AM       POC Tests: No results for input(s): POCGLU, POCNA, POCK, POCCL, POCBUN, POCHEMO, POCHCT in the last 72 hours.     Coags: No results found for: PROTIME, INR, APTT    HCG (If Applicable): No results found for: PREGTESTUR, PREGSERUM, HCG, HCGQUANT     ABGs: No results found for: PHART, PO2ART, QZG7JFA, MIY7ZMC, BEART, N2NUXDSK     Type & Screen (If Applicable):  No results found for: LABABO, [address]    Drug/Infectious Status (If Applicable):  No results found for: HIV, HEPCAB    COVID-19 Screening (If Applicable): No results found for: COVID19        Anesthesia Evaluation  Patient summary reviewed  Airway: Mallampati: II          Dental: normal exam         Pulmonary:Negative Pulmonary ROS breath sounds clear to auscultation                             Cardiovascular:  Exercise tolerance: good (>4 METS),       (-) past MI, murmur and peripheral edema      Rhythm: regular  Rate: normal                    Neuro/Psych:   Negative Neuro/Psych ROS     (-) CVA           GI/Hepatic/Renal: Neg GI/Hepatic/Renal ROS            Endo/Other: Negative Endo/Other ROS   (+) malignancy/cancer. Abdominal:             Vascular: negative vascular ROS. Other Findings:           Anesthesia Plan      TIVA     ASA 2       Induction: intravenous. Anesthetic plan and risks discussed with patient.                         Almaz Rodriguez MD   7/21/2022

## 2022-07-21 NOTE — DISCHARGE INSTRUCTIONS
Colonoscopy ultrasound - Lower Exam Discharge Instructions  Call Dr. Thien Yoo for any problems or questions. Contact the doctors office for follow up appointment as directed  Medication may cause drowsiness for several hours, therefore, do not drive or operate machinery for remainder of the day. No alcohol today. Do not make any important decisions such signing legal paperwork. Ordinarily, you may resume regular diet and activity after exam unless otherwise specified by your physician. Because of air put into your colon during exam, you may experience some abdominal distension, relieved by the passage of gas, for several hours. Contact your physician if you have any of the following:  Excessive amount of bleeding - large amount when having a bowel movement. Occasional specks of blood with bowel movement would not be unusual.  Severe abdominal pain  Fever or Chills    Any additional instructions:   Plan:  1. Resume diet and current medications. 2. Follow up with Surgery and Oncology as currently scheduled. Instructions given to Giuliano Kaylah and other family members.

## 2022-07-27 ENCOUNTER — HOSPITAL ENCOUNTER (OUTPATIENT)
Dept: PET IMAGING | Age: 47
Discharge: HOME | End: 2022-07-30
Payer: COMMERCIAL

## 2022-07-27 DIAGNOSIS — C20 RECTAL CANCER (HCC): ICD-10-CM

## 2022-07-27 LAB
GLUCOSE BLD STRIP.AUTO-MCNC: 94 MG/DL (ref 65–100)
SERVICE CMNT-IMP: NORMAL

## 2022-07-27 PROCEDURE — 6360000004 HC RX CONTRAST MEDICATION: Performed by: INTERNAL MEDICINE

## 2022-07-27 PROCEDURE — 3430000000 HC RX DIAGNOSTIC RADIOPHARMACEUTICAL: Performed by: INTERNAL MEDICINE

## 2022-07-27 PROCEDURE — A9552 F18 FDG: HCPCS | Performed by: INTERNAL MEDICINE

## 2022-07-27 PROCEDURE — 78815 PET IMAGE W/CT SKULL-THIGH: CPT

## 2022-07-27 PROCEDURE — 2580000003 HC RX 258: Performed by: INTERNAL MEDICINE

## 2022-07-27 PROCEDURE — 82962 GLUCOSE BLOOD TEST: CPT

## 2022-07-27 RX ORDER — FLUDEOXYGLUCOSE F 18 200 MCI/ML
11.93 INJECTION, SOLUTION INTRAVENOUS
Status: COMPLETED | OUTPATIENT
Start: 2022-07-27 | End: 2022-07-27

## 2022-07-27 RX ORDER — SODIUM CHLORIDE 0.9 % (FLUSH) 0.9 %
20 SYRINGE (ML) INJECTION AS NEEDED
Status: DISCONTINUED | OUTPATIENT
Start: 2022-07-27 | End: 2022-07-31 | Stop reason: HOSPADM

## 2022-07-27 RX ADMIN — DIATRIZOATE MEGLUMINE AND DIATRIZOATE SODIUM 10 ML: 660; 100 LIQUID ORAL; RECTAL at 13:17

## 2022-07-27 RX ADMIN — SODIUM CHLORIDE, PRESERVATIVE FREE 20 ML: 5 INJECTION INTRAVENOUS at 13:17

## 2022-07-27 RX ADMIN — FLUDEOXYGLUCOSE F 18 11.93 MILLICURIE: 200 INJECTION, SOLUTION INTRAVENOUS at 13:17

## 2022-07-28 ENCOUNTER — HOSPITAL ENCOUNTER (OUTPATIENT)
Dept: LAB | Age: 47
Discharge: HOME | End: 2022-07-31
Payer: COMMERCIAL

## 2022-07-28 ENCOUNTER — OFFICE VISIT (OUTPATIENT)
Dept: ONCOLOGY | Age: 47
End: 2022-07-28
Payer: COMMERCIAL

## 2022-07-28 VITALS
HEIGHT: 61 IN | TEMPERATURE: 98.4 F | HEART RATE: 86 BPM | OXYGEN SATURATION: 98 % | DIASTOLIC BLOOD PRESSURE: 76 MMHG | WEIGHT: 104.3 LBS | RESPIRATION RATE: 12 BRPM | BODY MASS INDEX: 19.69 KG/M2 | SYSTOLIC BLOOD PRESSURE: 115 MMHG

## 2022-07-28 DIAGNOSIS — C20 RECTAL CANCER (HCC): Primary | ICD-10-CM

## 2022-07-28 DIAGNOSIS — E61.1 IRON DEFICIENCY: ICD-10-CM

## 2022-07-28 DIAGNOSIS — C20 RECTAL CANCER (HCC): ICD-10-CM

## 2022-07-28 LAB
ALBUMIN SERPL-MCNC: 3.7 G/DL (ref 3.5–5)
ALBUMIN/GLOB SERPL: 1.1 {RATIO} (ref 1.2–3.5)
ALP SERPL-CCNC: 49 U/L (ref 50–136)
ALT SERPL-CCNC: 16 U/L (ref 12–65)
ANION GAP SERPL CALC-SCNC: 6 MMOL/L (ref 7–16)
AST SERPL-CCNC: 8 U/L (ref 15–37)
BASOPHILS # BLD: 0 K/UL (ref 0–0.2)
BASOPHILS NFR BLD: 1 % (ref 0–2)
BILIRUB SERPL-MCNC: 0.5 MG/DL (ref 0.2–1.1)
BUN SERPL-MCNC: 14 MG/DL (ref 6–23)
CALCIUM SERPL-MCNC: 9.2 MG/DL (ref 8.3–10.4)
CHLORIDE SERPL-SCNC: 106 MMOL/L (ref 98–107)
CO2 SERPL-SCNC: 27 MMOL/L (ref 21–32)
CREAT SERPL-MCNC: 0.6 MG/DL (ref 0.6–1)
DIFFERENTIAL METHOD BLD: NORMAL
EOSINOPHIL # BLD: 0.1 K/UL (ref 0–0.8)
EOSINOPHIL NFR BLD: 1 % (ref 0.5–7.8)
ERYTHROCYTE [DISTWIDTH] IN BLOOD BY AUTOMATED COUNT: 13.5 % (ref 11.9–14.6)
GLOBULIN SER CALC-MCNC: 3.5 G/DL (ref 2.3–3.5)
GLUCOSE SERPL-MCNC: 112 MG/DL (ref 65–100)
HCT VFR BLD AUTO: 38.4 % (ref 35.8–46.3)
HGB BLD-MCNC: 12.4 G/DL (ref 11.7–15.4)
IMM GRANULOCYTES # BLD AUTO: 0 K/UL (ref 0–0.5)
IMM GRANULOCYTES NFR BLD AUTO: 0 % (ref 0–5)
LYMPHOCYTES # BLD: 1.3 K/UL (ref 0.5–4.6)
LYMPHOCYTES NFR BLD: 29 % (ref 13–44)
MCH RBC QN AUTO: 27.7 PG (ref 26.1–32.9)
MCHC RBC AUTO-ENTMCNC: 32.3 G/DL (ref 31.4–35)
MCV RBC AUTO: 85.9 FL (ref 79.6–97.8)
MONOCYTES # BLD: 0.4 K/UL (ref 0.1–1.3)
MONOCYTES NFR BLD: 9 % (ref 4–12)
NEUTS SEG # BLD: 2.6 K/UL (ref 1.7–8.2)
NEUTS SEG NFR BLD: 60 % (ref 43–78)
NRBC # BLD: 0 K/UL (ref 0–0.2)
PLATELET # BLD AUTO: 216 K/UL (ref 150–450)
PMV BLD AUTO: 11.8 FL (ref 9.4–12.3)
POTASSIUM SERPL-SCNC: 3.6 MMOL/L (ref 3.5–5.1)
PROT SERPL-MCNC: 7.2 G/DL (ref 6.3–8.2)
RBC # BLD AUTO: 4.47 M/UL (ref 4.05–5.2)
SODIUM SERPL-SCNC: 139 MMOL/L (ref 136–145)
WBC # BLD AUTO: 4.3 K/UL (ref 4.3–11.1)

## 2022-07-28 PROCEDURE — 99215 OFFICE O/P EST HI 40 MIN: CPT | Performed by: INTERNAL MEDICINE

## 2022-07-28 PROCEDURE — 80053 COMPREHEN METABOLIC PANEL: CPT

## 2022-07-28 PROCEDURE — 85025 COMPLETE CBC W/AUTO DIFF WBC: CPT

## 2022-07-28 PROCEDURE — 36415 COLL VENOUS BLD VENIPUNCTURE: CPT

## 2022-07-28 RX ORDER — SODIUM CHLORIDE 9 MG/ML
5-40 INJECTION INTRAVENOUS PRN
Status: CANCELLED | OUTPATIENT
Start: 2022-08-13

## 2022-07-28 RX ORDER — HEPARIN SODIUM (PORCINE) LOCK FLUSH IV SOLN 100 UNIT/ML 100 UNIT/ML
500 SOLUTION INTRAVENOUS PRN
Status: CANCELLED | OUTPATIENT
Start: 2022-08-11

## 2022-07-28 RX ORDER — EPINEPHRINE 1 MG/ML
0.3 INJECTION, SOLUTION, CONCENTRATE INTRAVENOUS PRN
Status: CANCELLED | OUTPATIENT
Start: 2022-08-02

## 2022-07-28 RX ORDER — ATROPINE SULFATE 0.4 MG/ML
0.4 AMPUL (ML) INJECTION
Status: CANCELLED | OUTPATIENT
Start: 2022-08-11

## 2022-07-28 RX ORDER — SODIUM CHLORIDE 0.9 % (FLUSH) 0.9 %
5-40 SYRINGE (ML) INJECTION PRN
Status: CANCELLED | OUTPATIENT
Start: 2022-08-02

## 2022-07-28 RX ORDER — ONDANSETRON 2 MG/ML
8 INJECTION INTRAMUSCULAR; INTRAVENOUS
Status: CANCELLED | OUTPATIENT
Start: 2022-08-02

## 2022-07-28 RX ORDER — ONDANSETRON 2 MG/ML
8 INJECTION INTRAMUSCULAR; INTRAVENOUS
Status: CANCELLED | OUTPATIENT
Start: 2022-08-11

## 2022-07-28 RX ORDER — LIDOCAINE AND PRILOCAINE 25; 25 MG/G; MG/G
CREAM TOPICAL
Qty: 30 G | Refills: 2 | Status: SHIPPED | OUTPATIENT
Start: 2022-07-28

## 2022-07-28 RX ORDER — ONDANSETRON 2 MG/ML
8 INJECTION INTRAMUSCULAR; INTRAVENOUS ONCE
Status: CANCELLED | OUTPATIENT
Start: 2022-08-11 | End: 2022-08-05

## 2022-07-28 RX ORDER — DIPHENHYDRAMINE HYDROCHLORIDE 50 MG/ML
50 INJECTION INTRAMUSCULAR; INTRAVENOUS
Status: CANCELLED | OUTPATIENT
Start: 2022-08-11

## 2022-07-28 RX ORDER — SODIUM CHLORIDE 9 MG/ML
5-250 INJECTION, SOLUTION INTRAVENOUS PRN
Status: CANCELLED | OUTPATIENT
Start: 2022-08-02

## 2022-07-28 RX ORDER — FAMOTIDINE 10 MG/ML
20 INJECTION, SOLUTION INTRAVENOUS
Status: CANCELLED | OUTPATIENT
Start: 2022-08-02

## 2022-07-28 RX ORDER — EPINEPHRINE 1 MG/ML
0.3 INJECTION, SOLUTION, CONCENTRATE INTRAVENOUS PRN
Status: CANCELLED | OUTPATIENT
Start: 2022-08-11

## 2022-07-28 RX ORDER — SODIUM CHLORIDE 9 MG/ML
5-40 INJECTION INTRAVENOUS PRN
Status: CANCELLED | OUTPATIENT
Start: 2022-08-11

## 2022-07-28 RX ORDER — SODIUM CHLORIDE 0.9 % (FLUSH) 0.9 %
5-40 SYRINGE (ML) INJECTION PRN
Status: CANCELLED | OUTPATIENT
Start: 2022-08-11

## 2022-07-28 RX ORDER — HEPARIN SODIUM (PORCINE) LOCK FLUSH IV SOLN 100 UNIT/ML 100 UNIT/ML
500 SOLUTION INTRAVENOUS PRN
Status: CANCELLED | OUTPATIENT
Start: 2022-08-02

## 2022-07-28 RX ORDER — ONDANSETRON 4 MG/1
4 TABLET, FILM COATED ORAL EVERY 8 HOURS PRN
Qty: 90 TABLET | Refills: 1 | Status: SHIPPED | OUTPATIENT
Start: 2022-07-28 | End: 2022-09-21 | Stop reason: SDUPTHER

## 2022-07-28 RX ORDER — DIPHENHYDRAMINE HYDROCHLORIDE 50 MG/ML
50 INJECTION INTRAMUSCULAR; INTRAVENOUS
Status: CANCELLED | OUTPATIENT
Start: 2022-08-02

## 2022-07-28 RX ORDER — SODIUM CHLORIDE 0.9 % (FLUSH) 0.9 %
5-40 SYRINGE (ML) INJECTION PRN
Status: CANCELLED | OUTPATIENT
Start: 2022-08-13

## 2022-07-28 RX ORDER — PROCHLORPERAZINE MALEATE 10 MG
10 TABLET ORAL EVERY 6 HOURS PRN
Qty: 120 TABLET | Refills: 1 | Status: SHIPPED | OUTPATIENT
Start: 2022-07-28 | End: 2022-09-21 | Stop reason: SDUPTHER

## 2022-07-28 RX ORDER — SODIUM CHLORIDE 9 MG/ML
5-250 INJECTION, SOLUTION INTRAVENOUS PRN
Status: CANCELLED | OUTPATIENT
Start: 2022-08-11

## 2022-07-28 RX ORDER — SODIUM CHLORIDE 9 MG/ML
INJECTION, SOLUTION INTRAVENOUS CONTINUOUS
Status: CANCELLED | OUTPATIENT
Start: 2022-08-11

## 2022-07-28 RX ORDER — ALBUTEROL SULFATE 90 UG/1
4 AEROSOL, METERED RESPIRATORY (INHALATION) PRN
Status: CANCELLED | OUTPATIENT
Start: 2022-08-02

## 2022-07-28 RX ORDER — FAMOTIDINE 10 MG/ML
20 INJECTION, SOLUTION INTRAVENOUS
Status: CANCELLED | OUTPATIENT
Start: 2022-08-11

## 2022-07-28 RX ORDER — ACETAMINOPHEN 325 MG/1
650 TABLET ORAL
Status: CANCELLED | OUTPATIENT
Start: 2022-08-11

## 2022-07-28 RX ORDER — ACETAMINOPHEN 325 MG/1
650 TABLET ORAL
Status: CANCELLED | OUTPATIENT
Start: 2022-08-02

## 2022-07-28 RX ORDER — HEPARIN SODIUM (PORCINE) LOCK FLUSH IV SOLN 100 UNIT/ML 100 UNIT/ML
500 SOLUTION INTRAVENOUS PRN
Status: CANCELLED | OUTPATIENT
Start: 2022-08-13

## 2022-07-28 RX ORDER — ALBUTEROL SULFATE 90 UG/1
4 AEROSOL, METERED RESPIRATORY (INHALATION) PRN
Status: CANCELLED | OUTPATIENT
Start: 2022-08-11

## 2022-07-28 RX ORDER — ATROPINE SULFATE 0.4 MG/ML
0.4 AMPUL (ML) INJECTION ONCE
Status: CANCELLED | OUTPATIENT
Start: 2022-08-11 | End: 2022-08-05

## 2022-07-28 RX ORDER — SODIUM CHLORIDE 9 MG/ML
INJECTION, SOLUTION INTRAVENOUS CONTINUOUS
Status: CANCELLED | OUTPATIENT
Start: 2022-08-02

## 2022-07-28 RX ORDER — DEXTROSE MONOHYDRATE 50 MG/ML
5-250 INJECTION, SOLUTION INTRAVENOUS PRN
Status: CANCELLED | OUTPATIENT
Start: 2022-08-11

## 2022-07-28 RX ORDER — MEPERIDINE HYDROCHLORIDE 50 MG/ML
12.5 INJECTION INTRAMUSCULAR; INTRAVENOUS; SUBCUTANEOUS PRN
Status: CANCELLED | OUTPATIENT
Start: 2022-08-11

## 2022-07-28 RX ORDER — SODIUM CHLORIDE 9 MG/ML
5-250 INJECTION, SOLUTION INTRAVENOUS PRN
Status: CANCELLED | OUTPATIENT
Start: 2022-08-13

## 2022-07-28 ASSESSMENT — PATIENT HEALTH QUESTIONNAIRE - PHQ9
SUM OF ALL RESPONSES TO PHQ QUESTIONS 1-9: 0
2. FEELING DOWN, DEPRESSED OR HOPELESS: 0

## 2022-07-28 NOTE — PATIENT INSTRUCTIONS
Patient Instructions from Today's Visit    Reason for Visit:  Follow-up visit     Diagnosis Information:  [email or web address] net/about-us/asco-answers-patient-education-materials/bgvc-lhpinta-vdqh-sheets    Plan:  -The proposed total neoadjuvant therapy treatment plan includes a regimen called Folfirinox. These medications will be given through a port. -We will arrange an iron infusion because your iron was low. Port information  Apply generous amount (dime size) of emla cream (numbing cream) to port area and cover with plastic kitchen wrap about 1 hour prior to port being accessed with needle (labs, chemo, fluids, CT scans)  DO NOT rub into skin  Call if port area is ever red, painful or draining      Constipation  Pain medications, nausea medications and chemo can possibly cause constipation so watch for this very closely  Eat more high fiber foods   Drink at least 8 cups of water or other fluids each day  Prune juice, hot tea, or coffee can sometimes help stimulate bowel movements  Over the counter stool softeners - colace, Senokot S, etc  Take 1-2 tablets/capsules 1-2 times per day  May take Miralax in addition to stool softeners if needed. Controlling this is different for everyone. You will have to play with the medications to figure out what works for you. Use of nausea medications  Rest in a chair after meals; don't lie flat for at least 2 hours  Avoid fatty, spicy, fried and highly sweet foods  Eat bland foods and drink plenty of fluids  Eat smaller, frequent snack size meals  Avoid food aromas and strong smells that trigger nausea  It is important to take anti-nausea medications as prescribed   Take zofran every 8 hours around the clock for several days after chemo to help control nausea (morning, mid-day, and bedtime). This is good at preventing nausea from starting.     If you have additional nausea, compazine and/or ativan can be taken in between zofran doses  Do not stop zofran, just take compazine in addition to zofran  If nausea is still not controlled please call, there are other medications we can try  Please don't think you should have nausea because you are receiving chemo. There are other medications we can give you to help. New prescriptions when starting chemo  All or a combination of the following medication will be sent to your pharmacy. Nausea - Zofran 8mg every 8 hours as needed for nausea. Nausea - Compazine 10mg every 6 hours as needed for nausea (alternate with Zofran every 4 hours). Nausea - Ativan 1 mg every 8 hours as needed for nausea or anxiety. May be added to Zofran and Compazine for continued nausea. Port cream - Emla Cream - apply 1/2 inch to port site 60 minutes prior to needle access. Cover with Saran wrap to protect clothing. Follow Up:  -1-2 weeks to start treatment      Treatment Summary has been discussed and given to patient:      Your therapy regimen consists of four drugs. One of the chemotherapy drugs (Fluorouracil) will be infused through your port via pump for 46 hours. Will need a port placed to given chemotherapy through    Your Chemotherapy Plan      Diagnosis: Rectal cancer    Goal of Therapy: __ Palliative      _X_Curative     __Undetermined    Name of Chemotherapy medications: Fluorouracil/irinotecan/Oxaliplatin/Leucovorin    Number of Cycles Planned: 12               Length of Cycle:  every 14 days with a chemo pump you will wear home for 2 days      Chemotherapy plan is subject to change at your provider's discretion    A copy of this plan has been given to patient by Dilcia Solis RN. Chemo Education:   Scheduled     Potential side effects:  fatigue, nausea, diarrhea, decrease in blood counts, taste and appetite changes, general aches, mouth sores, swelling, neuropathy, cold sensitivity, etc    Will have a chemo education session prior to starting treatment. -------------------------------------------------------------------------------------------------------------------  Please call our office at [phone] if you have any  of the following symptoms:   Fever of 100.5 or greater  Chills  Shortness of breath  Swelling or pain in one leg    After office hours an answering service is available and will contact a provider for emergencies or if you are experiencing any of the above symptoms. Patient does express an interest in My Chart. My Chart log in information explained on the after visit summary printout at the [facility] [address] desk.     Leno Richey RN

## 2022-07-28 NOTE — PROGRESS NOTES
[facility] Hematology & Oncology: Office Visit Progress Note    Chief Complaint:    Rectal cancer    History of Present Illness:  Reason for Referral: Rectal cancer     Referring Provider:  Cory Lilly MD     Primary Care Provider: John Bai DO     Family History of Cancer/Hematologic Disorders: None noted     Presenting Symptoms: LLQ abdominal pain     Ms. Mari Pulido is a 51 y.o.  female who reports to have never used tobacco products. She denies use of alcohol or drug substances. Her medical history reports as rectal bleeding. Her surgical history reports as  and colonoscopy. On 7/15/22, Ms. Mari Pulido saw Dr Isaac Montoya for her newly diagnosed rectal cancer. She reported to have had rectal bleeding for 2 months which prompted her first colonoscopy. A 5 cm half circumferential friable mass was identified at the most proximal valve of Singh, biopsy revealed at least carcinoma in situ. 2022 CT scan of c/a/p demonstrated no distal metastases. She reported intermittent left lower quadrant abdominal pain for years. She has EUS scheduled for August however Dr Isaac Montoya reached out to Dr Tiffani Bhatia to move up her planned EUS. A referral was placed to hematology for consideration of neoadjuvant chemoradiation. Currently there is no referral to radiation oncology. Her 2022 CBC and CMP reported WNL except for a low creatinine of 0.47.     22 Colonoscopy         2022 Surgical Pathology  DIAGNOSIS  A:  \"RECTAL MASS BIOPSIES\":        FRAGMENTS OF AT LEAST IN SITU ADENOCARCINOMA. SEE COMMENT. COMMENT: BECAUSE BIOPSY FRAGMENTS ARE LIMITED PRIMARIALY TO MUCOSA,   SUBMUCOSAL INFILTRATION CANNOT BE RULED OUT   B:  \"RANDOM COLON BIOPSIES\":        FRAGMENTS OF HISTOLOGICALLY UNREMARKABLE LARGE INTESTINE   C:  \"ASCENDING COLON POLYP\":        FRAGMENT OF MIXED TUBULOVILLOUS ADENOMA     22 CT Chest Abdomen and Pelvis  FINDINGS:  Lungs: No pulmonary nodules. No airspace disease.   Airways: Trachea and proximal bronchi grossly patent. Pleura: No effusion or thickening or calcifications. Lymph Nodes: No enlarged axillary, hilar or mediastinal lymph nodes. Heart: Normal size. Coronaries: No definite calcifications. Aorta: Normal caliber. Esophagus: Thickening of the distal esophagus  Skeletal/Chest Wall: No gross bony lesions. Liver: A 4 mm cyst anterior segment right lobe, axial image 47, otherwise  unremarkable. Gallbladder: No gallstones identified  Bile Ducts: Not dilated. Pancreas: Unremarkable. Spleen: Uniform and normal size. Stomach: Unremarkable. Bowel: There is mural thickening of the rectum, probably a cyst 6 cm in length. Margins are indistinct. Small nodular densities probably represent mesorectal  node. Adrenals: Are normal size. Kidneys/Ureters: Enhance symmetrically. No hydronephrosis. Lymph Nodes: No grossly enlarged retroperitoneal, mesenteric, or pelvic  adenopathy. Vasculature: Aorta is normal caliber. Bones: No gross bony lesions. Other: No ascites. Impression  Mural thickening of the rectum suspicious for neoplasm. Margins are  indistinct. Probable small mesorectal lymph nodes. There is one small cyst in  liver. No pulmonary nodules. PET 81214:  1. Hypermetabolic rectal mass consistent with known rectal malignancy. No   evidence of metastatic disease in the neck, chest, abdomen or pelvis. Small   perirectal lymph nodes are present but may be too small to accurately   characterize by PET imaging. No FDG activity noted in these nodes. Review of Systems:  Constitutional Denies fever or chills. Denies weight loss or appetite changes. Denies fatigue. Denies anorexia. HEENT Denies trauma, bluring vision, hearing loss, ear pain, nosebleeds, sore throat, neck pain and ear discharge. Skin Denies lesions or rashes. Lungs Denies shortness of breath, cough, sputum production or hemoptysis. Cardiovascular Denies chest pain, palpitations, orthopnea, claudication and leg swelling. Gastrointestinal Blood in stool. Denies nausea, vomiting, bowel changes. Denies abdominal pain.  Denies dysuria, frequency or hesitancy of urination   Neuro Denies headaches, visual changes or ataxia. Denies dizziness, tingling, tremors, sensory change, speech change, focal weakness and headaches. Hematology Denies nasal/gum bleeding, denies easy bruise   Endo Denies heat/cold intolerance, denies diabetes. MSK Denies back pain, swollen legs, myalgias and falls. Psychiatric/Behavioral Denies depression and substance abuse. The patient is not nervous/anxious.        Allergies   Allergen Reactions    Amoxicillin Rash     Past Medical History:   Diagnosis Date    Cancer ([facility] [address]     rectal cancer-- dx 2022---- followed by dr Nhan Barker-- per spouse after scans/test  then will determine what the plan is    Rectal bleeding     onset 2022     Past Surgical History:   Procedure Laterality Date     SECTION      COLONOSCOPY      COLONOSCOPY N/A 2022    COLONOSCOPY ULTRASOUND/ BMI 19 performed by Shonda London MD at [facility] ENDOSCOPY     Family History   Problem Relation Age of Onset    High Blood Pressure Mother      Social History     Socioeconomic History    Marital status:      Spouse name: Not on file    Number of children: Not on file    Years of education: Not on file    Highest education level: Not on file   Occupational History    Not on file   Tobacco Use    Smoking status: Never    Smokeless tobacco: Never   Vaping Use    Vaping Use: Never used   Substance and Sexual Activity    Alcohol use: Yes     Comment: rare    Drug use: Never    Sexual activity: Not on file   Other Topics Concern    Not on file   Social History Narrative    Not on file     Social Determinants of Health     Financial Resource Strain: Not on file   Food Insecurity: Not on file   Transportation Needs: Not on file   Physical Activity: Not on file   Stress: Not on file   Social Connections: Not on file   Intimate Partner Violence: Not on file   Housing Stability: Not on file     Current Outpatient Medications   Medication Sig Dispense Refill    lidocaine-prilocaine (EMLA) 2.5-2.5 % cream Apply topically as needed. Place small amount to port site 45 minutes prior to blood draws and/or infusion. Cover with plastic wrap 30 g 2    prochlorperazine (COMPAZINE) 10 MG tablet Take 1 tablet by mouth every 6 hours as needed (nausea or vomiting) 120 tablet 1    ondansetron (ZOFRAN) 4 MG tablet Take 1 tablet by mouth every 8 hours as needed for Nausea or Vomiting 90 tablet 1    Multiple Vitamin (MULTI-VITAMIN PO) Take 1 tablet by mouth daily       No current facility-administered medications for this visit. Facility-Administered Medications Ordered in Other Visits   Medication Dose Route Frequency Provider Last Rate Last Admin    sodium chloride flush 0.9 % injection 20 mL  20 mL IntraVENous PRN Heather Arrieta MD   20 mL at 07/27/22 1317    diatrizoate meglumine-sodium (GASTROGRAFIN) 66-10 % solution 10 mL  10 mL Oral ONCE PRN Heather Arrieta MD   10 mL at 07/27/22 1317       OBJECTIVE:  /76 (Site: Left Upper Arm, Position: Standing)   Pulse 86   Temp 98.4 °F (36.9 °C)   Resp 12   Ht 5' 1\" (1.549 m)   Wt 104 lb 4.8 oz (47.3 kg)   LMP 06/14/2022   SpO2 98%   BMI 19.71 kg/m²     Physical Exam:  Constitutional: Oriented to person, place, and time. Well-developed and well-nourished. HEENT: Normocephalic and atraumatic. Oropharynx is clear and moist.   Conjunctivae and EOM are normal. Pupils are equal, round, and reactive to light. No scleral icterus. Neck supple. No JVD present. No tracheal deviation present. No thyromegaly present. Lymph node No palpable submandibular, cervical, supraclavicular, axillary and inguinal lymph nodes. Skin Warm and dry. No bruising and no rash noted. No erythema. No pallor. Respiratory Effort normal and breath sounds normal.  No respiratory distress. No wheezes.   No rales. No tenderness. CVS Normal rate, regular rhythm and normal heart sounds. Exam reveals no gallop, no friction and no rub. No murmur heard. Abdomen Soft. Bowel sounds are normal. Exhibits no distension. There is no tenderness. There is no rebound and no guarding. Neuro Normal reflexes. No cranial nerve deficit. Exhibits normal muscle tone, 5 of 5 strength of all extremities. MSK Normal range of motion in general.  No edema and no tenderness.    Psych Normal mood, affect, behavior, judgment and thought content      Labs:  Recent Results (from the past 24 hour(s))   CBC with Auto Differential    Collection Time: 07/28/22 10:09 AM   Result Value Ref Range    WBC 4.3 4.3 - 11.1 K/uL    RBC 4.47 4.05 - 5.2 M/uL    Hemoglobin 12.4 11.7 - 15.4 g/dL    Hematocrit 38.4 35.8 - 46.3 %    MCV 85.9 79.6 - 97.8 FL    MCH 27.7 26.1 - 32.9 PG    MCHC 32.3 31.4 - 35.0 g/dL    RDW 13.5 11.9 - 14.6 %    Platelets 109 278 - 411 K/uL    MPV 11.8 9.4 - 12.3 FL    nRBC 0.00 0.0 - 0.2 K/uL    Differential Type AUTOMATED      Seg Neutrophils 60 43 - 78 %    Lymphocytes 29 13 - 44 %    Monocytes 9 4.0 - 12.0 %    Eosinophils % 1 0.5 - 7.8 %    Basophils 1 0.0 - 2.0 %    Immature Granulocytes 0 0.0 - 5.0 %    Segs Absolute 2.6 1.7 - 8.2 K/UL    Absolute Lymph # 1.3 0.5 - 4.6 K/UL    Absolute Mono # 0.4 0.1 - 1.3 K/UL    Absolute Eos # 0.1 0.0 - 0.8 K/UL    Basophils Absolute 0.0 0.0 - 0.2 K/UL    Absolute Immature Granulocyte 0.0 0.0 - 0.5 K/UL   Comprehensive Metabolic Panel    Collection Time: 07/28/22 10:09 AM   Result Value Ref Range    Sodium 139 136 - 145 mmol/L    Potassium 3.6 3.5 - 5.1 mmol/L    Chloride 106 98 - 107 mmol/L    CO2 27 21 - 32 mmol/L    Anion Gap 6 (L) 7 - 16 mmol/L    Glucose 112 (H) 65 - 100 mg/dL    BUN 14 6 - 23 MG/DL    Creatinine 0.60 0.6 - 1.0 MG/DL    GFR African American >60 >60 ml/min/1.73m2    GFR Non- >60 >60 ml/min/1.73m2    Calcium 9.2 8.3 - 10.4 MG/DL    Total Bilirubin 0.5 0.2 - 1.1 MG/DL    ALT 16 12 - 65 U/L    AST 8 (L) 15 - 37 U/L    Alk Phosphatase 49 (L) 50 - 136 U/L    Total Protein 7.2 6.3 - 8.2 g/dL    Albumin 3.7 3.5 - 5.0 g/dL    Globulin 3.5 2.3 - 3.5 g/dL    Albumin/Globulin Ratio 1.1 (L) 1.2 - 3.5         Imaging:  No results found for this or any previous visit. ASSESSMENT/PLAN:   Diagnosis Orders   1. Rectal cancer (HCC)  MRI PELVIS RECTAL STAGING WWO CONTRAST    IR PORT PLACEMENT > 5 YEARS    CBC with Auto Differential    Comprehensive Metabolic Panel    Hepatitis B Core Antibody, Total    Hepatitis B Surface Antibody    Hepatitis B Surface Antigen    Hepatitis C Antibody    CBC With Auto Differential    Comprehensive metabolic panel    HEPATITIS B SURFACE ANTIGEN    Hepatitis B core antibody, total    Hepatitis B surface antibody      2. Iron deficiency            51 y.o. F urgently consulted for rectal carcinoma.   She had good general baseline health, developed frequency of bowel movement and blood in stool for 2 months, colonoscopy showed low rectal adenocarcinoma and urgently consulted to oncology, discussed carcinogenesis and staging, urgent arrangement of EUS reported T3N0 disease but quite large avid disease on PET with small lymph nodes, discussed at tumor board and concern of level of local invasion, arrange rectal MRI, discussed neoadjuvant treatment options with patient and wife and desired to treat aggressively for maximal chance of CR, arrange JENNIFER in the manner of Prodige 23 (Neoadjuvant rectal cancer treatment with JENNIFER using three months of modified FOLFIRINOX (oxaliplatin 85 mg/m2, leucovorin 400 mg/m2, irinotecan 180 mg/m2 day 1, and FU 2400 mg/m2 over 46 hours every 14 days) followed by long-course CRT (50 Gy in 25 fractions plus concurrent capecitabine), TME, and three additional months of FOLFOX or capecitabine), we discussed toxicities and management, arrange port placement, antiemetics, Emla, patient eager to start, IV iron, arranged accordingly and call as needed. All questions are answered to their satisfaction. They will call for further questions and concerns. ECOG PERFORMANCE STATUS - 0-Fully active, able to carry on all pre-disease performance without restriction. Pain - 0 - No pain/10. None/Minimal pain - not affecting QOL     Fatigue - No flowsheet data found. Distress - No flowsheet data found. Total time independently spent on today's visit was 40min. This time included: face-to-face time evaluating the patient as well as additional non-face-to-face time spent on: Preparing to see the patient by obtaining and reviewing previous test results, records and medical history, Performing a medically appropriate history and exam and documenting relevant clinical information for this visit, Counseling and educating patient and family, Ordering medications, Communicating with other health care professionals and Referring patient to another health care provider. Elements of this note have been dictated via voice recognition software. Text and phrases may be limited by the accuracy and autoconversion of the software. The chart has been reviewed, but errors may still be present. Clair Vinson M.D.   [address], [address] [place] [address]  Office : [phone]  Fax : [phone]

## 2022-07-29 ENCOUNTER — CLINICAL DOCUMENTATION (OUTPATIENT)
Dept: CASE MANAGEMENT | Age: 47
End: 2022-07-29

## 2022-07-29 DIAGNOSIS — C20 RECTAL CANCER (HCC): Primary | ICD-10-CM

## 2022-07-29 NOTE — PROGRESS NOTES
Pt will start Folfirinox chemo. Pt will be scheduled for port placement, meds (compazine, Zofran and Emla) sent to pharmacy and reviewed use w . Pt will have rectal MRI as ordered Dr Collin Stern prior to chemo start. Chemo ed and FC arranged ---pt's English is not as fluent as spouse but both verbalize understanding of all instructions received.  Nurse navigation will be following

## 2022-08-02 ENCOUNTER — HOSPITAL ENCOUNTER (OUTPATIENT)
Dept: INTERVENTIONAL RADIOLOGY/VASCULAR | Age: 47
Discharge: HOME | End: 2022-08-05
Payer: COMMERCIAL

## 2022-08-02 ENCOUNTER — HOSPITAL ENCOUNTER (OUTPATIENT)
Dept: INFUSION THERAPY | Age: 47
Discharge: HOME | End: 2022-08-02
Payer: COMMERCIAL

## 2022-08-02 VITALS
SYSTOLIC BLOOD PRESSURE: 120 MMHG | OXYGEN SATURATION: 97 % | RESPIRATION RATE: 16 BRPM | TEMPERATURE: 98.4 F | DIASTOLIC BLOOD PRESSURE: 75 MMHG | HEART RATE: 80 BPM

## 2022-08-02 VITALS
HEIGHT: 61 IN | WEIGHT: 104 LBS | TEMPERATURE: 98 F | DIASTOLIC BLOOD PRESSURE: 59 MMHG | HEART RATE: 69 BPM | BODY MASS INDEX: 19.63 KG/M2 | RESPIRATION RATE: 16 BRPM | OXYGEN SATURATION: 100 % | SYSTOLIC BLOOD PRESSURE: 127 MMHG

## 2022-08-02 DIAGNOSIS — C20 RECTAL CANCER (HCC): ICD-10-CM

## 2022-08-02 DIAGNOSIS — E61.1 IRON DEFICIENCY: Primary | ICD-10-CM

## 2022-08-02 PROCEDURE — 2580000003 HC RX 258: Performed by: RADIOLOGY

## 2022-08-02 PROCEDURE — 2500000003 HC RX 250 WO HCPCS: Performed by: RADIOLOGY

## 2022-08-02 PROCEDURE — 2580000003 HC RX 258: Performed by: INTERNAL MEDICINE

## 2022-08-02 PROCEDURE — 36561 INSERT TUNNELED CV CATH: CPT

## 2022-08-02 PROCEDURE — 96365 THER/PROPH/DIAG IV INF INIT: CPT

## 2022-08-02 PROCEDURE — 6360000002 HC RX W HCPCS: Performed by: RADIOLOGY

## 2022-08-02 PROCEDURE — 6360000002 HC RX W HCPCS: Performed by: INTERNAL MEDICINE

## 2022-08-02 RX ORDER — EPINEPHRINE 1 MG/ML
0.3 INJECTION, SOLUTION, CONCENTRATE INTRAVENOUS PRN
Status: CANCELLED | OUTPATIENT
Start: 2022-08-09

## 2022-08-02 RX ORDER — DIPHENHYDRAMINE HYDROCHLORIDE 50 MG/ML
50 INJECTION INTRAMUSCULAR; INTRAVENOUS
Status: CANCELLED | OUTPATIENT
Start: 2022-08-09

## 2022-08-02 RX ORDER — SODIUM CHLORIDE 9 MG/ML
5-250 INJECTION, SOLUTION INTRAVENOUS PRN
Status: DISCONTINUED | OUTPATIENT
Start: 2022-08-02 | End: 2022-08-03 | Stop reason: HOSPADM

## 2022-08-02 RX ORDER — LIDOCAINE HYDROCHLORIDE AND EPINEPHRINE BITARTRATE 20; .01 MG/ML; MG/ML
INJECTION, SOLUTION SUBCUTANEOUS
Status: COMPLETED | OUTPATIENT
Start: 2022-08-02 | End: 2022-08-02

## 2022-08-02 RX ORDER — SODIUM CHLORIDE 9 MG/ML
INJECTION, SOLUTION INTRAVENOUS CONTINUOUS PRN
Status: COMPLETED | OUTPATIENT
Start: 2022-08-02 | End: 2022-08-02

## 2022-08-02 RX ORDER — SODIUM CHLORIDE 9 MG/ML
INJECTION, SOLUTION INTRAVENOUS CONTINUOUS
Status: CANCELLED | OUTPATIENT
Start: 2022-08-09

## 2022-08-02 RX ORDER — FENTANYL CITRATE 50 UG/ML
INJECTION, SOLUTION INTRAMUSCULAR; INTRAVENOUS
Status: COMPLETED | OUTPATIENT
Start: 2022-08-02 | End: 2022-08-02

## 2022-08-02 RX ORDER — MIDAZOLAM HYDROCHLORIDE 2 MG/2ML
INJECTION, SOLUTION INTRAMUSCULAR; INTRAVENOUS
Status: COMPLETED | OUTPATIENT
Start: 2022-08-02 | End: 2022-08-02

## 2022-08-02 RX ORDER — ONDANSETRON 2 MG/ML
8 INJECTION INTRAMUSCULAR; INTRAVENOUS
Status: CANCELLED | OUTPATIENT
Start: 2022-08-09

## 2022-08-02 RX ORDER — ACETAMINOPHEN 325 MG/1
650 TABLET ORAL EVERY 4 HOURS PRN
Status: DISCONTINUED | OUTPATIENT
Start: 2022-08-02 | End: 2022-08-06 | Stop reason: HOSPADM

## 2022-08-02 RX ORDER — SODIUM CHLORIDE 0.9 % (FLUSH) 0.9 %
5-40 SYRINGE (ML) INJECTION PRN
Status: CANCELLED | OUTPATIENT
Start: 2022-08-09

## 2022-08-02 RX ORDER — ALBUTEROL SULFATE 90 UG/1
4 AEROSOL, METERED RESPIRATORY (INHALATION) PRN
Status: CANCELLED | OUTPATIENT
Start: 2022-08-09

## 2022-08-02 RX ORDER — HEPARIN SODIUM (PORCINE) LOCK FLUSH IV SOLN 100 UNIT/ML 100 UNIT/ML
500 SOLUTION INTRAVENOUS PRN
Status: CANCELLED | OUTPATIENT
Start: 2022-08-09

## 2022-08-02 RX ORDER — SODIUM CHLORIDE 9 MG/ML
5-250 INJECTION, SOLUTION INTRAVENOUS PRN
Status: CANCELLED | OUTPATIENT
Start: 2022-08-09

## 2022-08-02 RX ORDER — LIDOCAINE HYDROCHLORIDE 20 MG/ML
INJECTION, SOLUTION INFILTRATION; PERINEURAL
Status: COMPLETED | OUTPATIENT
Start: 2022-08-02 | End: 2022-08-02

## 2022-08-02 RX ORDER — ACETAMINOPHEN 325 MG/1
650 TABLET ORAL
Status: CANCELLED | OUTPATIENT
Start: 2022-08-09

## 2022-08-02 RX ORDER — SODIUM CHLORIDE 0.9 % (FLUSH) 0.9 %
5-40 SYRINGE (ML) INJECTION PRN
Status: DISCONTINUED | OUTPATIENT
Start: 2022-08-02 | End: 2022-08-03 | Stop reason: HOSPADM

## 2022-08-02 RX ORDER — HEPARIN SODIUM 5000 [USP'U]/ML
INJECTION, SOLUTION INTRAVENOUS; SUBCUTANEOUS
Status: COMPLETED | OUTPATIENT
Start: 2022-08-02 | End: 2022-08-02

## 2022-08-02 RX ADMIN — FENTANYL CITRATE 50 MCG: 50 INJECTION, SOLUTION INTRAMUSCULAR; INTRAVENOUS at 09:04

## 2022-08-02 RX ADMIN — HEPARIN SODIUM 5000 UNITS: 5000 INJECTION INTRAVENOUS; SUBCUTANEOUS at 09:15

## 2022-08-02 RX ADMIN — SODIUM CHLORIDE 25 ML/HR: 9 INJECTION, SOLUTION INTRAVENOUS at 08:50

## 2022-08-02 RX ADMIN — MIDAZOLAM HYDROCHLORIDE 1 MG: 1 INJECTION, SOLUTION INTRAMUSCULAR; INTRAVENOUS at 09:04

## 2022-08-02 RX ADMIN — FENTANYL CITRATE 50 MCG: 50 INJECTION, SOLUTION INTRAMUSCULAR; INTRAVENOUS at 08:51

## 2022-08-02 RX ADMIN — FERRIC CARBOXYMALTOSE INJECTION 750 MG: 50 INJECTION, SOLUTION INTRAVENOUS at 14:52

## 2022-08-02 RX ADMIN — LIDOCAINE HYDROCHLORIDE 60 MG: 20 INJECTION, SOLUTION INFILTRATION; PERINEURAL at 09:05

## 2022-08-02 RX ADMIN — Medication 10 ML: at 14:30

## 2022-08-02 RX ADMIN — MIDAZOLAM HYDROCHLORIDE 1 MG: 1 INJECTION, SOLUTION INTRAMUSCULAR; INTRAVENOUS at 08:51

## 2022-08-02 RX ADMIN — LIDOCAINE HYDROCHLORIDE,EPINEPHRINE BITARTRATE 100 MG: 20; .01 INJECTION, SOLUTION INFILTRATION; PERINEURAL at 09:06

## 2022-08-02 ASSESSMENT — PAIN - FUNCTIONAL ASSESSMENT: PAIN_FUNCTIONAL_ASSESSMENT: NONE - DENIES PAIN

## 2022-08-02 NOTE — OP NOTE
Department of Interventional Radiology  [phone]        Interventional Radiology Brief Procedure Note    Patient: Aj Savage MRN: [id number]  SSN: [id number]    YOB: 1975  Age: 51 y.o.   Sex: female      Date of Procedure: 8/2/2022    Pre-Procedure Diagnosis: rectal cancer    Post-Procedure Diagnosis: SAME    Procedure(s): Venous Chest Port Placement    Brief Description of Procedure: as above    Performed By: Janice Corbett MD     Assistants: None    Anesthesia:Moderate Sedation    Estimated Blood Loss: Less than 10ml    Specimens:      Implants:  port    Findings: patent right IJ    Complications: None    Recommendations: ok to use     Follow Up: prn    Signed By: Janice Corbett MD     August 2, 2022

## 2022-08-02 NOTE — H&P
Department of Interventional Radiology  [phone]    History and Physical    Patient:  Gardenia White MRN:  [id number]  SSN:  [id number]    YOB: 1975  Age:  51 y.o. Sex:  female      Primary Care Provider:  Sumit Gallagher DO, DO  Referring Physician:  Camila Wheeler MD    Subjective:     Chief Complaint: port    History of the Present Illness: The patient is a 51 y.o. female with rectal cancerwho presents for venous chest port placement. NPO. Past Medical History:   Diagnosis Date    Cancer [facility])     rectal cancer-- dx 2022---- followed by dr Evan Carbone-- per spouse after scans/test  then will determine what the plan is    Rectal bleeding     onset 2022     Past Surgical History:   Procedure Laterality Date     SECTION      COLONOSCOPY      COLONOSCOPY N/A 2022    COLONOSCOPY ULTRASOUND/ BMI 19 performed by Maria Del Carmen Stein MD at [facility] ENDOSCOPY        Review of Systems:    Pertinent items are noted in HPI. Prior to Admission medications    Medication Sig Start Date End Date Taking? Authorizing Provider   lidocaine-prilocaine (EMLA) 2.5-2.5 % cream Apply topically as needed. Place small amount to port site 45 minutes prior to blood draws and/or infusion.  Cover with plastic wrap 22   Alisia Mckeon MD   prochlorperazine (COMPAZINE) 10 MG tablet Take 1 tablet by mouth every 6 hours as needed (nausea or vomiting) 22   Alisia Mckeon MD   ondansetron [facility]) 4 MG tablet Take 1 tablet by mouth every 8 hours as needed for Nausea or Vomiting 22   Alisia Mckeon MD   Multiple Vitamin (MULTI-VITAMIN PO) Take 1 tablet by mouth daily    Historical Provider, MD        Allergies   Allergen Reactions    Amoxicillin Rash       Family History   Problem Relation Age of Onset    High Blood Pressure Mother      Social History     Tobacco Use    Smoking status: Never    Smokeless tobacco: Never   Substance Use Topics    Alcohol use: Yes     Comment: rare Not in a hospital admission. Objective:       Physical Examination:    Vitals:    08/02/22 0711 08/02/22 0713   BP:  131/66   Pulse:  72   Resp:  16   Temp:  98 °F (36.7 °C)   TempSrc:  Oral   SpO2:  99%   Weight: 104 lb (47.2 kg)    Height: 5' 1\" (1.549 m)        Pain Assessment     denies                                                HEART: regular rate and rhythm  LUNG: clear to auscultation bilaterally  ABDOMEN: normal findings: soft, non-tender  EXTREMITIES: warm, no edema    Laboratory:     Lab Results   Component Value Date/Time     07/28/2022 10:09 AM     07/18/2022 11:18 AM    K 3.6 07/28/2022 10:09 AM    K 4.0 07/18/2022 11:18 AM     07/28/2022 10:09 AM     07/18/2022 11:18 AM    CO2 27 07/28/2022 10:09 AM    CO2 27 07/18/2022 11:18 AM    BUN 14 07/28/2022 10:09 AM    BUN 13 07/18/2022 11:18 AM    GFRAA >60 07/28/2022 10:09 AM    GFRAA >60 07/18/2022 11:18 AM    GLOB 3.5 07/28/2022 10:09 AM    GLOB 3.4 07/18/2022 11:18 AM    ALT 16 07/28/2022 10:09 AM    ALT 14 07/18/2022 11:18 AM     Lab Results   Component Value Date/Time    WBC 4.3 07/28/2022 10:09 AM    WBC 5.6 07/18/2022 11:18 AM    HGB 12.4 07/28/2022 10:09 AM    HGB 12.6 07/18/2022 11:18 AM    HCT 38.4 07/28/2022 10:09 AM    HCT 39.7 07/18/2022 11:18 AM     07/28/2022 10:09 AM     07/18/2022 11:18 AM     No results found for: APTT, INR    Assessment:     Rectal cancer    [unfilled]    Plan:     Planned Procedure:  port placement    Risks, benefits, and alternatives reviewed with patient and she agrees to proceed with the procedure.       Signed By: Bebe Chen PA-C     August 2, 2022

## 2022-08-02 NOTE — DISCHARGE INSTRUCTIONS
If you have any questions about your procedure, please call the Interventional Radiology department at [phone]. After business hours (5pm) and weekends, call the answering service at [phone] and ask for the Radiologist on call to be paged. Si tiene Preguntas acerca del procedimiento, por favor llame al departamento de Radiología Intervencional al [phone]. Después de horas de oficina (5 pm) y los fines de [place], llamar al Hilary Pitt al [phone] y pregunte por el Radiologo de [facility]. Interventional Radiology General Nurse Discharge    After general anesthesia or intravenous sedation, for 24 hours or while taking prescription Narcotics:  Limit your activities  Do not drive and operate hazardous machinery  Do not make important personal or business decisions  Do  not drink alcoholic beverages  If you have not urinated within 8 hours after discharge, please contact your surgeon on call. * Please give a list of your current medications to your Primary Care Provider. * Please update this list whenever your medications are discontinued, doses are     changed, or new medications (including over-the-counter products) are added. * Please carry medication information at all times in case of emergency situations. These are general instructions for a healthy lifestyle:    No smoking/ No tobacco products/ Avoid exposure to second hand smoke  Surgeon General's Warning:  Quitting smoking now greatly reduces serious risk to your health.     Obesity, smoking, and sedentary lifestyle greatly increases your risk for illness  A healthy diet, regular physical exercise & weight monitoring are important for maintaining a healthy lifestyle    You may be retaining fluid if you have a history of heart failure or if you experience any of the following symptoms:  Weight gain of 3 pounds or more overnight or 5 pounds in a week, increased swelling in our hands or feet or shortness of breath while lying flat in bed. Please call your doctor as soon as you notice any of these symptoms; do not wait until your next office visit. Recognize signs and symptoms of STROKE:  F-face looks uneven    A-arms unable to move or move unevenly    S-speech slurred or non-existent    T-time-call 911 as soon as signs and symptoms begin-DO NOT go       Back to bed or wait to see if you get better-TIME IS BRAIN.

## 2022-08-02 NOTE — PRE SEDATION
Sedation Pre-Procedure Note    Patient Name: Lavern Dhillon   YOB: 1975  Room/Bed: Room/bed info not found  Medical Record Number: [id number]  Date: 2022   Time: 7:51 AM       Indication:  rectal cancer    Consent: I have discussed with the patient and/or the patient representative the indication, alternatives, and the possible risks and/or complications of the planned procedure and the anesthesia methods. The patient and/or patient representative appear to understand and agree to proceed. Vital Signs:   Vitals:    22 0713   BP: 131/66   Pulse: 72   Resp: 16   Temp: 98 °F (36.7 °C)   SpO2: 99%       Past Medical History:   has a past medical history of Cancer ([address] and Rectal bleeding. Past Surgical History:   has a past surgical history that includes  section; Colonoscopy; and Colonoscopy (N/A, 2022). Medications:   Scheduled Meds:   Continuous Infusions:   PRN Meds:   Home Meds:   Prior to Admission medications    Medication Sig Start Date End Date Taking? Authorizing Provider   lidocaine-prilocaine (EMLA) 2.5-2.5 % cream Apply topically as needed. Place small amount to port site 45 minutes prior to blood draws and/or infusion. Cover with plastic wrap 22   Pa Guillen MD   prochlorperazine (COMPAZINE) 10 MG tablet Take 1 tablet by mouth every 6 hours as needed (nausea or vomiting) 22   Pa Guillen MD   ondansetron (ZOFRAN) 4 MG tablet Take 1 tablet by mouth every 8 hours as needed for Nausea or Vomiting 22   Pa Guillen MD   Multiple Vitamin (MULTI-VITAMIN PO) Take 1 tablet by mouth daily    Historical Provider, MD         Pre-Sedation Documentation and Exam:   I have personally completed a history, physical exam & review of systems for this patient (see notes).     Mallampati Airway Assessment:  normal, dentition not prohibitive, normal neck range of motion, Mallampati Class I - (soft palate, fauces, uvula & anterior/posterior tonsillar pillars are visible)    Prior History of Anesthesia Complications:   none    ASA Classification:  Class 2 - A normal healthy patient with mild systemic disease    Sedation/ Anesthesia Plan:   intravenous sedation    Medications Planned:   midazolam (Versed) intravenously and fentanyl intravenously    Patient is an appropriate candidate for plan of sedation: yes    Electronically signed by Thien Garcia PA-C on 8/2/2022 at 7:51 AM

## 2022-08-02 NOTE — OR NURSING
TRANSFER - OUT REPORT:           Verbal report given to Ariane Lucero RN on Dianna Gambino  being transferred to IR room 2 for routine post-op              Report consisted of patients Situation, Background, Assessment and      Recommendations(SBAR). Information from the following report(s) SBAR, Procedure Summary and MAR was reviewed with the receiving nurse. Opportunity for questions and clarification was provided. Conscious Sedation:    100 Mcg of Fentanyl administered   2 Mg of Versed administered       Pt tolerated procedure well.

## 2022-08-02 NOTE — PROGRESS NOTES
Recovery period without difficulty. Pt alert and oriented and denies pain. Dressing is clean, dry, and intact. Reviewed discharge instructions with patient and spouse, both verbalized understanding. Pt escorted to lobby discharge area via wheelchair. Vital signs and Víctor score completed.

## 2022-08-02 NOTE — PROGRESS NOTES
Arrived to the Count includes the [facility]. Injectafer infusion completed. Patient tolerated well. Any issues or concerns during appointment: none. Patient aware of next infusion appointment on 08/11/2022 (date) at 0830 (time). Discharged ambulatory.

## 2022-08-03 ENCOUNTER — HOSPITAL ENCOUNTER (OUTPATIENT)
Dept: MRI IMAGING | Age: 47
Discharge: HOME | End: 2022-08-06
Payer: COMMERCIAL

## 2022-08-03 DIAGNOSIS — C20 RECTAL CANCER (HCC): ICD-10-CM

## 2022-08-03 PROCEDURE — 72197 MRI PELVIS W/O & W/DYE: CPT

## 2022-08-03 PROCEDURE — A9579 GAD-BASE MR CONTRAST NOS,1ML: HCPCS | Performed by: INTERNAL MEDICINE

## 2022-08-03 PROCEDURE — 2580000003 HC RX 258: Performed by: INTERNAL MEDICINE

## 2022-08-03 PROCEDURE — 6360000004 HC RX CONTRAST MEDICATION: Performed by: INTERNAL MEDICINE

## 2022-08-03 RX ORDER — SODIUM CHLORIDE 0.9 % (FLUSH) 0.9 %
10 SYRINGE (ML) INJECTION AS NEEDED
Status: DISCONTINUED | OUTPATIENT
Start: 2022-08-03 | End: 2022-08-07 | Stop reason: HOSPADM

## 2022-08-03 RX ADMIN — SODIUM CHLORIDE, PRESERVATIVE FREE 10 ML: 5 INJECTION INTRAVENOUS at 18:51

## 2022-08-03 RX ADMIN — GADOTERIDOL 10 ML: 279.3 INJECTION, SOLUTION INTRAVENOUS at 18:51

## 2022-08-05 ENCOUNTER — CLINICAL DOCUMENTATION (OUTPATIENT)
Dept: ONCOLOGY | Age: 47
End: 2022-08-05

## 2022-08-05 NOTE — PROGRESS NOTES
I spoke with Michael Rosales via the telephone regarding her current [employer], potential oral medication authorizations, enrollment in the [employer]) and the [address] [place] [address] ([address]), and assistance organization resource sheet. The patient will review the cancer programs. Next, I spoke with Michael Rosales regarding her insurance questions. I answered questions about medical treatments and services. Next, I spoke with Michael Rosales regarding the financial assistance application. Next, I spoke with Michael Rosales about deductibles, copayments, and out of pocket maximums regarding her [employer]. Next, I spoke with Michael Rosales regarding the [address] document. Michael Connie will review the [employer] Next, we discussed costs associated with the Folfirinox Medications. Next, I spoke with Michael Rosales regarding potential oral medication authorizations. I told her that if she ever had any problems getting her oral medications filled to give the dedicated [facility]  a call. Most of the time, it is simply an authorization that needs to be done with the insurance company. Michael Rosales will receive a folder containing agency contact information, My Chart, financial assistance application, LPOA, cancer programs, brochures, and my business card. Michael Connie expressed understanding of the information above and all questions were answered to her satisfaction.

## 2022-08-09 ENCOUNTER — OFFICE VISIT (OUTPATIENT)
Dept: ONCOLOGY | Age: 47
End: 2022-08-09
Payer: COMMERCIAL

## 2022-08-09 VITALS
DIASTOLIC BLOOD PRESSURE: 63 MMHG | BODY MASS INDEX: 20.3 KG/M2 | HEIGHT: 61 IN | RESPIRATION RATE: 21 BRPM | SYSTOLIC BLOOD PRESSURE: 112 MMHG | HEART RATE: 86 BPM | WEIGHT: 107.5 LBS | OXYGEN SATURATION: 99 %

## 2022-08-09 DIAGNOSIS — Z71.9 ENCOUNTER FOR HEALTH EDUCATION: Primary | ICD-10-CM

## 2022-08-09 PROCEDURE — 99214 OFFICE O/P EST MOD 30 MIN: CPT | Performed by: NURSE PRACTITIONER

## 2022-08-09 RX ORDER — AMOXICILLIN AND CLAVULANATE POTASSIUM 875; 125 MG/1; MG/1
TABLET, FILM COATED ORAL
COMMUNITY
Start: 2022-06-23 | End: 2022-08-24

## 2022-08-09 ASSESSMENT — PATIENT HEALTH QUESTIONNAIRE - PHQ9
SUM OF ALL RESPONSES TO PHQ QUESTIONS 1-9: 0
SUM OF ALL RESPONSES TO PHQ QUESTIONS 1-9: 0
1. LITTLE INTEREST OR PLEASURE IN DOING THINGS: 0
SUM OF ALL RESPONSES TO PHQ QUESTIONS 1-9: 0
SUM OF ALL RESPONSES TO PHQ9 QUESTIONS 1 & 2: 0
SUM OF ALL RESPONSES TO PHQ QUESTIONS 1-9: 0
2. FEELING DOWN, DEPRESSED OR HOPELESS: 0

## 2022-08-09 NOTE — PROGRESS NOTES
Giuliano Adrian is a 51 y. o.female with rectal cancer who presents for chemotherapy education for the following medications:  FOLFIRINOX. Schedule, frequency, and duration of chemotherapy was discussed with patient. The patient was given handouts published by the [facility] entitled, \"Chemotherapy and You\" and \"Eating Hints Before, During, and After Cancer Treatment\" for their reference. Medication specific information was printed from [employer] and distributed to the patient. Self care guidelines were distributed and discussed with the patient and included the following. ... 1) Potential long term and short term side effects of therapy including fertility risks for appropriate patients    2)  Symptoms and side effects that require the patient to contact the [address] [place] [address] or require immediate attention    3)  Symptoms or events that require immediate discontinuation of oral or self administered treatments    4)  Procedures for handling medications at home, including storage, safe handling, and management of unused medications    5)  Procedures for handling bodily fluids and waste in the home. 6)  The [address] [place] [address]'s contact information with availability and instructions on who and when to call    7)  The [address] [place] [address] missed appointment policy and expectations for rescheduling or canceling. Patient denies any needs. Patient denies any barriers to learn. Preference in learning style accessed as visual, written, verbal.    Patient acknowledges readiness to learn by responding appropriately to open ended questions about chemo education, disease process, treatment plan and possible side effects etc.    Patient and/or family/caregiver feedback on learning and education: NA    Patient acknowledges the importance of and agrees to adhering to treatment plan regimen.     Patient printed education materials were given to patient. Advanced planning form given to patient during education session, patient was advised to contact the office if they would like a referral to social work/ palliative care to complete an advance care plan or would like more information regarding this. Vitals:     Vitals:    08/09/22 0928   BP: 112/63   Site: Left Upper Arm   Position: Sitting   Cuff Size: Medium Adult   Pulse: 86   Resp: 21   SpO2: 99%   Weight: 107 lb 8 oz (48.8 kg)   Height: 5' 1\" (1.549 m)     No prescriptions given today (has compazine and zofran). Time was then allowed to accept patient questions. Patient and/or caregiver verbalized understanding of their treatment plan. Remote  utilized throughout visit.     Total time spent: 45 min               Grayson Rizvi [facility]  [id number] [place] [address]  Office : [phone]  Fax : [phone]

## 2022-08-10 ENCOUNTER — OFFICE VISIT (OUTPATIENT)
Dept: ONCOLOGY | Age: 47
End: 2022-08-10
Payer: COMMERCIAL

## 2022-08-10 ENCOUNTER — HOSPITAL ENCOUNTER (OUTPATIENT)
Dept: LAB | Age: 47
Discharge: HOME | End: 2022-08-13
Payer: COMMERCIAL

## 2022-08-10 VITALS
SYSTOLIC BLOOD PRESSURE: 114 MMHG | RESPIRATION RATE: 16 BRPM | TEMPERATURE: 98.5 F | WEIGHT: 107.9 LBS | DIASTOLIC BLOOD PRESSURE: 68 MMHG | OXYGEN SATURATION: 98 % | BODY MASS INDEX: 20.37 KG/M2 | HEIGHT: 61 IN | HEART RATE: 81 BPM

## 2022-08-10 DIAGNOSIS — C20 RECTAL CANCER (HCC): ICD-10-CM

## 2022-08-10 DIAGNOSIS — C20 RECTAL CANCER (HCC): Primary | ICD-10-CM

## 2022-08-10 DIAGNOSIS — D50.0 IRON DEFICIENCY ANEMIA DUE TO CHRONIC BLOOD LOSS: ICD-10-CM

## 2022-08-10 DIAGNOSIS — Z79.899 HIGH RISK MEDICATION USE: ICD-10-CM

## 2022-08-10 LAB
ALBUMIN SERPL-MCNC: 3.5 G/DL (ref 3.5–5)
ALBUMIN/GLOB SERPL: 1 {RATIO} (ref 1.2–3.5)
ALP SERPL-CCNC: 53 U/L (ref 50–136)
ALT SERPL-CCNC: 19 U/L (ref 12–65)
ANION GAP SERPL CALC-SCNC: 5 MMOL/L (ref 7–16)
AST SERPL-CCNC: 11 U/L (ref 15–37)
BASOPHILS # BLD: 0 K/UL (ref 0–0.2)
BASOPHILS NFR BLD: 1 % (ref 0–2)
BILIRUB SERPL-MCNC: 0.2 MG/DL (ref 0.2–1.1)
BUN SERPL-MCNC: 15 MG/DL (ref 6–23)
CALCIUM SERPL-MCNC: 8.7 MG/DL (ref 8.3–10.4)
CEA SERPL-MCNC: 10.2 NG/ML (ref 0–3)
CHLORIDE SERPL-SCNC: 105 MMOL/L (ref 98–107)
CO2 SERPL-SCNC: 28 MMOL/L (ref 21–32)
CREAT SERPL-MCNC: 0.6 MG/DL (ref 0.6–1)
DIFFERENTIAL METHOD BLD: ABNORMAL
EOSINOPHIL # BLD: 0.1 K/UL (ref 0–0.8)
EOSINOPHIL NFR BLD: 2 % (ref 0.5–7.8)
ERYTHROCYTE [DISTWIDTH] IN BLOOD BY AUTOMATED COUNT: 14.7 % (ref 11.9–14.6)
GLOBULIN SER CALC-MCNC: 3.5 G/DL (ref 2.3–3.5)
GLUCOSE SERPL-MCNC: 109 MG/DL (ref 65–100)
HCT VFR BLD AUTO: 38.2 % (ref 35.8–46.3)
HGB BLD-MCNC: 12.1 G/DL (ref 11.7–15.4)
IMM GRANULOCYTES # BLD AUTO: 0.1 K/UL (ref 0–0.5)
IMM GRANULOCYTES NFR BLD AUTO: 1 % (ref 0–5)
LYMPHOCYTES # BLD: 1.6 K/UL (ref 0.5–4.6)
LYMPHOCYTES NFR BLD: 27 % (ref 13–44)
MCH RBC QN AUTO: 27.4 PG (ref 26.1–32.9)
MCHC RBC AUTO-ENTMCNC: 31.7 G/DL (ref 31.4–35)
MCV RBC AUTO: 86.6 FL (ref 79.6–97.8)
MONOCYTES # BLD: 0.4 K/UL (ref 0.1–1.3)
MONOCYTES NFR BLD: 6 % (ref 4–12)
NEUTS SEG # BLD: 3.6 K/UL (ref 1.7–8.2)
NEUTS SEG NFR BLD: 63 % (ref 43–78)
NRBC # BLD: 0 K/UL (ref 0–0.2)
PLATELET # BLD AUTO: 158 K/UL (ref 150–450)
PMV BLD AUTO: 11.7 FL (ref 9.4–12.3)
POTASSIUM SERPL-SCNC: 3.9 MMOL/L (ref 3.5–5.1)
PROT SERPL-MCNC: 7 G/DL (ref 6.3–8.2)
RBC # BLD AUTO: 4.41 M/UL (ref 4.05–5.2)
SODIUM SERPL-SCNC: 138 MMOL/L (ref 136–145)
WBC # BLD AUTO: 5.7 K/UL (ref 4.3–11.1)

## 2022-08-10 PROCEDURE — 80053 COMPREHEN METABOLIC PANEL: CPT

## 2022-08-10 PROCEDURE — 99215 OFFICE O/P EST HI 40 MIN: CPT | Performed by: INTERNAL MEDICINE

## 2022-08-10 PROCEDURE — 85025 COMPLETE CBC W/AUTO DIFF WBC: CPT

## 2022-08-10 PROCEDURE — 36415 COLL VENOUS BLD VENIPUNCTURE: CPT

## 2022-08-10 PROCEDURE — 82378 CARCINOEMBRYONIC ANTIGEN: CPT

## 2022-08-10 NOTE — PROGRESS NOTES
[address] [place] [address] Hematology & Oncology: Office Visit Progress Note    Chief Complaint:    Rectal cancer    History of Present Illness:  Reason for Referral: Rectal cancer     Referring Provider:  Patel Green MD     Primary Care Provider: Leandra Garrison DO     Family History of Cancer/Hematologic Disorders: None noted     Presenting Symptoms: LLQ abdominal pain     Ms. Liliane Lang is a 51 y.o.  female who reports to have never used tobacco products. She denies use of alcohol or drug substances. Her medical history reports as rectal bleeding. Her surgical history reports as  and colonoscopy. On 7/15/22, Ms. Liliane Lang saw Dr Genesis Avila for her newly diagnosed rectal cancer. She reported to have had rectal bleeding for 2 months which prompted her first colonoscopy. A 5 cm half circumferential friable mass was identified at the most proximal valve of Singh, biopsy revealed at least carcinoma in situ. 2022 CT scan of c/a/p demonstrated no distal metastases. She reported intermittent left lower quadrant abdominal pain for years. She has EUS scheduled for August however Dr Genesis Avila reached out to Dr Arturo Sebastian to move up her planned EUS. A referral was placed to hematology for consideration of neoadjuvant chemoradiation. Currently there is no referral to radiation oncology. Her 2022 CBC and CMP reported WNL except for a low creatinine of 0.47.     22 Colonoscopy         2022 Surgical Pathology  DIAGNOSIS  A:  \"RECTAL MASS BIOPSIES\":        FRAGMENTS OF AT LEAST IN SITU ADENOCARCINOMA. SEE COMMENT. COMMENT: BECAUSE BIOPSY FRAGMENTS ARE LIMITED PRIMARIALY TO MUCOSA,   SUBMUCOSAL INFILTRATION CANNOT BE RULED OUT   B:  \"RANDOM COLON BIOPSIES\":        FRAGMENTS OF HISTOLOGICALLY UNREMARKABLE LARGE INTESTINE   C:  \"ASCENDING COLON POLYP\":        FRAGMENT OF MIXED TUBULOVILLOUS ADENOMA     22 CT Chest Abdomen and Pelvis  FINDINGS:  Lungs: No pulmonary nodules. No airspace disease.   Airways: Trachea and proximal bronchi grossly patent. Pleura: No effusion or thickening or calcifications. Lymph Nodes: No enlarged axillary, hilar or mediastinal lymph nodes. Heart: Normal size. Coronaries: No definite calcifications. Aorta: Normal caliber. Esophagus: Thickening of the distal esophagus  Skeletal/Chest Wall: No gross bony lesions. Liver: A 4 mm cyst anterior segment right lobe, axial image 47, otherwise  unremarkable. Gallbladder: No gallstones identified  Bile Ducts: Not dilated. Pancreas: Unremarkable. Spleen: Uniform and normal size. Stomach: Unremarkable. Bowel: There is mural thickening of the rectum, probably a cyst 6 cm in length. Margins are indistinct. Small nodular densities probably represent mesorectal  node. Adrenals: Are normal size. Kidneys/Ureters: Enhance symmetrically. No hydronephrosis. Lymph Nodes: No grossly enlarged retroperitoneal, mesenteric, or pelvic  adenopathy. Vasculature: Aorta is normal caliber. Bones: No gross bony lesions. Other: No ascites. Impression  Mural thickening of the rectum suspicious for neoplasm. Margins are  indistinct. Probable small mesorectal lymph nodes. There is one small cyst in  liver. No pulmonary nodules. PET 16888:  1. Hypermetabolic rectal mass consistent with known rectal malignancy. No   evidence of metastatic disease in the neck, chest, abdomen or pelvis. Small   perirectal lymph nodes are present but may be too small to accurately   characterize by PET imaging. No FDG activity noted in these nodes. Review of Systems:  Constitutional Denies fever or chills. Denies weight loss or appetite changes. Denies fatigue. Denies anorexia. HEENT Denies trauma, bluring vision, hearing loss, ear pain, nosebleeds, sore throat, neck pain and ear discharge. Skin Denies lesions or rashes. Lungs Denies shortness of breath, cough, sputum production or hemoptysis. Cardiovascular Denies chest pain, palpitations, orthopnea, claudication and leg swelling. Gastrointestinal Blood in stool. Denies nausea, vomiting, bowel changes. Denies abdominal pain.  Denies dysuria, frequency or hesitancy of urination   Neuro Denies headaches, visual changes or ataxia. Denies dizziness, tingling, tremors, sensory change, speech change, focal weakness and headaches. Hematology Denies nasal/gum bleeding, denies easy bruise   Endo Denies heat/cold intolerance, denies diabetes. MSK Denies back pain, swollen legs, myalgias and falls. Psychiatric/Behavioral Denies depression and substance abuse. The patient is not nervous/anxious.        Allergies   Allergen Reactions    Amoxicillin Rash     Past Medical History:   Diagnosis Date    Cancer ([facility] [address]     rectal cancer-- dx 2022---- followed by dr Emily Camp-- per spouse after scans/test  then will determine what the plan is    Rectal bleeding     onset 2022     Past Surgical History:   Procedure Laterality Date     SECTION      COLONOSCOPY      COLONOSCOPY N/A 2022    COLONOSCOPY ULTRASOUND/ BMI 19 performed by Len Eli MD at [facility] ENDOSCOPY    IR PORT PLACEMENT EQUAL OR GREATER THAN 5 YEARS  2022    IR PORT PLACEMENT EQUAL OR GREATER THAN 5 YEARS 2022 [facility] SPECIALS     Family History   Problem Relation Age of Onset    High Blood Pressure Mother      Social History     Socioeconomic History    Marital status:      Spouse name: Not on file    Number of children: Not on file    Years of education: Not on file    Highest education level: Not on file   Occupational History    Not on file   Tobacco Use    Smoking status: Never    Smokeless tobacco: Never   Vaping Use    Vaping Use: Never used   Substance and Sexual Activity    Alcohol use: Yes     Comment: rare    Drug use: Never    Sexual activity: Not on file   Other Topics Concern    Not on file   Social History Narrative    Not on file     Social Determinants of Health     Financial Resource Strain: Not on file   Food Insecurity: Not on file   Transportation Needs: Not on file   Physical Activity: Not on file   Stress: Not on file   Social Connections: Not on file   Intimate Partner Violence: Not on file   Housing Stability: Not on file     Current Outpatient Medications   Medication Sig Dispense Refill    amoxicillin-clavulanate (AUGMENTIN) 875-125 MG per tablet TAKE 1 TABLET BY MOUTH TWICE A DAY FOR 10 DAYS (Patient not taking: Reported on 8/9/2022)      lidocaine-prilocaine (EMLA) 2.5-2.5 % cream Apply topically as needed. Place small amount to port site 45 minutes prior to blood draws and/or infusion. Cover with plastic wrap 30 g 2    prochlorperazine (COMPAZINE) 10 MG tablet Take 1 tablet by mouth every 6 hours as needed (nausea or vomiting) (Patient not taking: Reported on 8/9/2022) 120 tablet 1    ondansetron (ZOFRAN) 4 MG tablet Take 1 tablet by mouth every 8 hours as needed for Nausea or Vomiting (Patient not taking: Reported on 8/9/2022) 90 tablet 1    Multiple Vitamin (MULTI-VITAMIN PO) Take 1 tablet by mouth daily (Patient not taking: Reported on 8/9/2022)       No current facility-administered medications for this visit. OBJECTIVE:  /68 (Position: Sitting)   Pulse 81   Temp 98.5 °F (36.9 °C)   Resp 16   Ht 5' 1\" (1.549 m)   Wt 107 lb 14.4 oz (48.9 kg)   SpO2 98%   BMI 20.39 kg/m²     Physical Exam:  Constitutional: Oriented to person, place, and time. Well-developed and well-nourished. HEENT: Normocephalic and atraumatic. Oropharynx is clear and moist.   Conjunctivae and EOM are normal. Pupils are equal, round, and reactive to light. No scleral icterus. Neck supple. No JVD present. No tracheal deviation present. No thyromegaly present. Lymph node No palpable submandibular, cervical, supraclavicular, axillary and inguinal lymph nodes. Skin Warm and dry. No bruising and no rash noted. No erythema. No pallor. Respiratory Effort normal and breath sounds normal.  No respiratory distress. No wheezes. No rales. No tenderness. CVS Normal rate, regular rhythm and normal heart sounds. Exam reveals no gallop, no friction and no rub. No murmur heard. Abdomen Soft. Bowel sounds are normal. Exhibits no distension. There is no tenderness. There is no rebound and no guarding. Neuro Normal reflexes. No cranial nerve deficit. Exhibits normal muscle tone, 5 of 5 strength of all extremities. MSK Normal range of motion in general.  No edema and no tenderness.    Psych Normal mood, affect, behavior, judgment and thought content      Labs:  Recent Results (from the past 24 hour(s))   CBC with Auto Differential    Collection Time: 08/10/22 12:42 PM   Result Value Ref Range    WBC 5.7 4.3 - 11.1 K/uL    RBC 4.41 4.05 - 5.2 M/uL    Hemoglobin 12.1 11.7 - 15.4 g/dL    Hematocrit 38.2 35.8 - 46.3 %    MCV 86.6 79.6 - 97.8 FL    MCH 27.4 26.1 - 32.9 PG    MCHC 31.7 31.4 - 35.0 g/dL    RDW 14.7 (H) 11.9 - 14.6 %    Platelets 540 086 - 393 K/uL    MPV 11.7 9.4 - 12.3 FL    nRBC 0.00 0.0 - 0.2 K/uL    Differential Type AUTOMATED      Seg Neutrophils 63 43 - 78 %    Lymphocytes 27 13 - 44 %    Monocytes 6 4.0 - 12.0 %    Eosinophils % 2 0.5 - 7.8 %    Basophils 1 0.0 - 2.0 %    Immature Granulocytes 1 0.0 - 5.0 %    Segs Absolute 3.6 1.7 - 8.2 K/UL    Absolute Lymph # 1.6 0.5 - 4.6 K/UL    Absolute Mono # 0.4 0.1 - 1.3 K/UL    Absolute Eos # 0.1 0.0 - 0.8 K/UL    Basophils Absolute 0.0 0.0 - 0.2 K/UL    Absolute Immature Granulocyte 0.1 0.0 - 0.5 K/UL   Comprehensive Metabolic Panel    Collection Time: 08/10/22 12:42 PM   Result Value Ref Range    Sodium 138 136 - 145 mmol/L    Potassium 3.9 3.5 - 5.1 mmol/L    Chloride 105 98 - 107 mmol/L    CO2 28 21 - 32 mmol/L    Anion Gap 5 (L) 7 - 16 mmol/L    Glucose 109 (H) 65 - 100 mg/dL    BUN 15 6 - 23 MG/DL    Creatinine 0.60 0.6 - 1.0 MG/DL    GFR African American >60 >60 ml/min/1.73m2    GFR Non- >60 >60 ml/min/1.73m2    Calcium 8.7 8.3 - 10.4 MG/DL    Total Bilirubin 0.2 0.2 - 1.1 MG/DL    ALT 19 12 - 65 U/L    AST 11 (L) 15 - 37 U/L    Alk Phosphatase 53 50 - 136 U/L    Total Protein 7.0 6.3 - 8.2 g/dL    Albumin 3.5 3.5 - 5.0 g/dL    Globulin 3.5 2.3 - 3.5 g/dL    Albumin/Globulin Ratio 1.0 (L) 1.2 - 3.5     CEA    Collection Time: 08/10/22 12:42 PM   Result Value Ref Range    CEA 10.2 (H) 0.0 - 3.0 ng/mL       Imaging:  No results found for this or any previous visit. ASSESSMENT/PLAN:   Diagnosis Orders   1. Rectal cancer ([address]  CEA      2. Iron deficiency anemia due to chronic blood loss        3. High risk medication use              51 y.o. F urgently consulted for rectal carcinoma.   She had good general baseline health, developed frequency of bowel movement and blood in stool for 2 months, colonoscopy showed low rectal adenocarcinoma and urgently consulted to oncology, discussed carcinogenesis and staging, urgent arrangement of EUS reported T3N0 disease but quite large avid disease on PET with small lymph nodes, discussed at tumor board and concern of level of local invasion, arrange rectal MRI, discussed neoadjuvant treatment options with patient and wife and desired to treat aggressively for maximal chance of CR, arrange JENNIFER in the manner of [address] (Neoadjuvant rectal cancer treatment with JENNIFER using three months of modified FOLFIRINOX (oxaliplatin 85 mg/m2, leucovorin 400 mg/m2, irinotecan 180 mg/m2 day 1, and FU 2400 mg/m2 over 46 hours every 14 days) followed by long-course CRT (50 Gy in 25 fractions plus concurrent capecitabine), TME, and three additional months of FOLFOX or capecitabine), we discussed toxicities and management, arrange port placement, antiemetics, Emla, IV iron, rectal MRI confirmed chief receive M0 disease, proceed to cycle 1 neoadjuvant FOLFIRINOX 8/11/2022, again discussed the toxicity management and answered all questions, discussed the management of nutrition and bowel movement, return in 2 weeks for cycle 2 but call as needed. All questions are answered to their satisfaction. They will call for further questions and concerns. ECOG PERFORMANCE STATUS - 0-Fully active, able to carry on all pre-disease performance without restriction. Pain - 0 - No pain/10. None/Minimal pain - not affecting QOL     Fatigue - No flowsheet data found. Distress - No flowsheet data found. Elements of this note have been dictated via voice recognition software. Text and phrases may be limited by the accuracy and autoconversion of the software. The chart has been reviewed, but errors may still be present. Dina Israel M.D.   [address]  Office : [phone]  Fax : [phone]

## 2022-08-10 NOTE — PATIENT INSTRUCTIONS
Patient Instructions from Today's Visit    Reason for Visit:  Prechemo start of therapy    Diagnosis Information:  https://www.[employer]/. net/about-us/asco-answers-patient-education-materials/qssh-zfpcyxb-bbey-sheets  ASCO ANSWERS is a collection of oncologist-approved patient education materials developed by the [employer] of Clinical Oncology (ASCO) for people with cancer and their caregivers. Colorectal Cancer    ILLUSTRATION BY [place]. © 2004 AMERICAN SOCIETY OF CLINICAL ONCOLOGY. What is colorectal cancer? Colorectal cancer is a disease in which healthy cells in the lining of the colon or rectum change and grow out of control. This cell growth can form a noncancerous polyp that could become a cancerous tumor. Most colon and rectal cancers are a type of tumor called adenocarcinoma. Colorectal cancer is the fourth most common type of cancer diagnosed in the [place]. What is the function of the colon and rectum? The colon and rectum make up the large intestine, which plays an important role in the bodys ability to process waste. The large intestine turns food digested by the stomach and small intestine into fecal waste, or stool, that leaves the body through the anus. What does stage mean? The stage is a way of describing where the cancer is located, if or where it has spread, and whether it is affecting other parts of the body. There are 5 stages for colorectal cancer: stage 0 (zero) and stages I through IV (1 though 4). Find more descriptions and illustrations of these stages at www.cancer. net/colorectal.     How is colorectal cancer treated? The treatment of colorectal cancer depends on the location and extent of the tumor, whether the cancer has spread, and the persons overall health. For cancers that start in the colon, surgery is typically the first treatment.  For cancers that start in the rectum (the last 4 to 5 inches of the large intestine), surgery may be the first treatment or chemotherapy and/or radiation may be given before surgery. Additional treatment may be given to lower the risk of the cancer returning or to treat cancer that has spread. This may include radiation therapy and chemotherapy. If the cancer has spread outside the colon and rectum, then chemotherapy or targeted therapy will be used. Occasionally, surgery may also be used to remove cancer that has spread past the colon and rectum. When making treatment decisions, people may also consider a clinical trial; talk with your doctor about all treatment options. The side effects of colorectal cancer treatment can often be prevented or managed with the help of your health care team. This is called palliative care and is an important part of the overall treatment plan. How can I cope with colorectal cancer? Absorbing the news of a cancer diagnosis and communicating with your health care team are key parts of the coping process. Seeking support, organizing your health information, making sure all of your questions are answered, and participating in the decision-making process are other steps. Talk with your health care team about any concerns. Understanding your emotions and those of people close to you can be helpful in managing the diagnosis, treatment, and healing process. MADE AVAILABLE [facility] OF CLINICAL ONCOLOGY   [place], [address] [place] [address], [address] [place] [address]  Toll Free: [phone]  Phone: [phone] www. [employer]  [email or web address] org © 2017 American Society of Clinical Oncology. For permissions information, contact [email or web address]     Questions to ask the health care team   Regular communication is important in making informed decisions about your health care. Consider asking your health care team the following questions:   What type of colon or rectal cancer do I have? Where exactly is the cancer located?    Can you explain my pathology report (laboratory test results) to me? What stage is the colon or rectal cancer? What does this mean? Has my tumor been tested for microsatellite instability and other molecular features? Would you explain my treatment options? What clinical trials are available for me? Where are they located, and how do I  find out more about them? What treatment plan do you recommend? Why? What is the goal of each treatment? Is it to eliminate the cancer, help me feel  better, or both? Who will be part of my treatment team, and what does each member do? Do  they have experience treating colorectal cancer? How will this treatment affect my daily life? Will I be able to work, exercise, and  perform my usual activities? Will this treatment affect my ability to become pregnant or have children? What long-term side effects may be associated with my cancer treatment? If Im worried about managing the costs of cancer care, who can help me? Where can I find emotional support for me and my family? Whom should I call with questions or problems? Find more questions to ask the health care team at [address] [employer]. net/colorectal. For a digital list of questions, download Cancer. Nets free mobile nilay at www.cancer. net/nilay. The ideas and opinions expressed here do not necessarily reflect the opinions of the American Society of Clinical Oncology (ASCO) or [employer]. The information in this fact sheet is not intended as medical or legal advice, or as a substitute for consultation with a physician or other licensed health care provider. Patients with health care-related questions should call or see their physician or other health care provider promptly and should not disregard professional medical advice, or delay seeking it, because of information encountered here. The mention of any product, service, or treatment in this fact sheet should not be construed as an ASCO endorsement. [employer] is not responsible for any injury or damage to persons or property arising out of or related to any use of [employer]s patient education materials, or to any errors or omissions. To order more printed copies, please call [phone] or visit www.cancer. net/blade. WORDS TO KNOW     Adenoma: A specific type of polyp that is likely to become cancerous     Benign: A tumor that is not cancerous     Biopsy: Removal of a tissue sample that is then examined under a microscope to check for cancer cells     Chemotherapy: The use of drugs to destroy cancer cells     Colonoscopy: A test that allows doctors to look inside the colon and rectum for polyps or cancer using a colonoscope (lighted tube)     Lymph node: A tiny, bean-shaped organ that fights infection     Malignant: A tumor that is cancerous     Metastasis: The spread of cancer from where it began to another part of the body     Oncologist: A doctor who specializes in treating cancer     Polyp: A growth in the colon or rectum that is a risk factor for colorectal cancer     Prognosis: Chance of recovery     Radiation therapy:  The use of high-energy x-rays to destroy cancer cells     Targeted therapy: Treatment that targets the cancers specific genes, proteins, or the tissue environment that contributes to cancer growth and survival   [id number]      Plan:  -Start FOLFIRINOX regimen tomorrow (see below)    Follow Up:  As scheduled    Recent Lab Results:  Hospital Outpatient Visit on 08/10/2022   Component Date Value Ref Range Status    WBC 08/10/2022 5.7  4.3 - 11.1 K/uL Final    RBC 08/10/2022 4.41  4.05 - 5.2 M/uL Final    Hemoglobin 08/10/2022 12.1  11.7 - 15.4 g/dL Final    Hematocrit 08/10/2022 38.2  35.8 - 46.3 % Final    MCV 08/10/2022 86.6  79.6 - 97.8 FL Final    MCH 08/10/2022 27.4  26.1 - 32.9 PG Final    MCHC 08/10/2022 31.7  31.4 - 35.0 g/dL Final    RDW 08/10/2022 14.7 (A) 11.9 - 14.6 % Final    Platelets 88/79/8152 158  150 - 450 K/uL Final    MPV 08/10/2022 11.7  9.4 - 12.3 FL Final    nRBC 08/10/2022 0.00  0.0 - 0.2 K/uL Final    **Note: Absolute NRBC parameter is now reported with Hemogram**    Differential Type 08/10/2022 AUTOMATED    Final    Seg Neutrophils 08/10/2022 63  43 - 78 % Final    Lymphocytes 08/10/2022 27  13 - 44 % Final    Monocytes 08/10/2022 6  4.0 - 12.0 % Final    Eosinophils % 08/10/2022 2  0.5 - 7.8 % Final    Basophils 08/10/2022 1  0.0 - 2.0 % Final    Immature Granulocytes 08/10/2022 1  0.0 - 5.0 % Final    Segs Absolute 08/10/2022 3.6  1.7 - 8.2 K/UL Final    Absolute Lymph # 08/10/2022 1.6  0.5 - 4.6 K/UL Final    Absolute Mono # 08/10/2022 0.4  0.1 - 1.3 K/UL Final    Absolute Eos # 08/10/2022 0.1  0.0 - 0.8 K/UL Final    Basophils Absolute 08/10/2022 0.0  0.0 - 0.2 K/UL Final    Absolute Immature Granulocyte 08/10/2022 0.1  0.0 - 0.5 K/UL Final        Treatment Summary has been discussed and given to patient: n/a        -------------------------------------------------------------------------------------------------------------------  Please call our office at [phone] if you have any  of the following symptoms:   Fever of 100.5 or greater  Chills  Shortness of breath  Swelling or pain in one leg    After office hours an answering service is available and will contact a provider for emergencies or if you are experiencing any of the above symptoms. Patient does express an interest in My Chart. My Chart log in information explained on the after visit summary printout at the [facility] [address] desk.     Danyelle Grimes, RN  Nurse Navigator  [address] [place] [address] [phone]     Your Chemotherapy Plan      Diagnosis: Rectal cancer    Goal of Therapy:     Curative        Name of Chemotherapy medications: Fluorouracil/Irinotecan/Oxaliplatin/Leucovorin    Number of Cycles Planed: 6                  Length of Cycle:  14 days      Chemotherapy plan is subject to change at your provider's discretion    A copy of this

## 2022-08-11 ENCOUNTER — CLINICAL DOCUMENTATION (OUTPATIENT)
Dept: ONCOLOGY | Age: 47
End: 2022-08-11

## 2022-08-11 ENCOUNTER — HOSPITAL ENCOUNTER (OUTPATIENT)
Dept: INFUSION THERAPY | Age: 47
Discharge: HOME | End: 2022-08-11
Payer: COMMERCIAL

## 2022-08-11 VITALS
TEMPERATURE: 98.8 F | SYSTOLIC BLOOD PRESSURE: 152 MMHG | DIASTOLIC BLOOD PRESSURE: 83 MMHG | RESPIRATION RATE: 16 BRPM | HEART RATE: 58 BPM | BODY MASS INDEX: 20.14 KG/M2 | OXYGEN SATURATION: 99 % | WEIGHT: 106.6 LBS

## 2022-08-11 DIAGNOSIS — C20 RECTAL CANCER (HCC): Primary | ICD-10-CM

## 2022-08-11 DIAGNOSIS — E61.1 IRON DEFICIENCY: ICD-10-CM

## 2022-08-11 PROCEDURE — 96367 TX/PROPH/DG ADDL SEQ IV INF: CPT

## 2022-08-11 PROCEDURE — G0498 CHEMO EXTEND IV INFUS W/PUMP: HCPCS

## 2022-08-11 PROCEDURE — 6360000002 HC RX W HCPCS: Performed by: INTERNAL MEDICINE

## 2022-08-11 PROCEDURE — 96417 CHEMO IV INFUS EACH ADDL SEQ: CPT

## 2022-08-11 PROCEDURE — 96375 TX/PRO/DX INJ NEW DRUG ADDON: CPT

## 2022-08-11 PROCEDURE — 2580000003 HC RX 258: Performed by: INTERNAL MEDICINE

## 2022-08-11 PROCEDURE — 96372 THER/PROPH/DIAG INJ SC/IM: CPT

## 2022-08-11 PROCEDURE — 96413 CHEMO IV INFUSION 1 HR: CPT

## 2022-08-11 PROCEDURE — 96368 THER/DIAG CONCURRENT INF: CPT

## 2022-08-11 PROCEDURE — 96415 CHEMO IV INFUSION ADDL HR: CPT

## 2022-08-11 RX ORDER — ATROPINE SULFATE 0.4 MG/ML
0.4 AMPUL (ML) INJECTION ONCE
Status: COMPLETED | OUTPATIENT
Start: 2022-08-11 | End: 2022-08-11

## 2022-08-11 RX ORDER — ONDANSETRON 2 MG/ML
8 INJECTION INTRAMUSCULAR; INTRAVENOUS ONCE
Status: COMPLETED | OUTPATIENT
Start: 2022-08-11 | End: 2022-08-11

## 2022-08-11 RX ORDER — SODIUM CHLORIDE 0.9 % (FLUSH) 0.9 %
5-40 SYRINGE (ML) INJECTION PRN
Status: DISCONTINUED | OUTPATIENT
Start: 2022-08-11 | End: 2022-08-12 | Stop reason: HOSPADM

## 2022-08-11 RX ORDER — SODIUM CHLORIDE 0.9 % (FLUSH) 0.9 %
5-40 SYRINGE (ML) INJECTION PRN
OUTPATIENT
Start: 2022-08-16

## 2022-08-11 RX ORDER — ALBUTEROL SULFATE 90 UG/1
4 AEROSOL, METERED RESPIRATORY (INHALATION) PRN
OUTPATIENT
Start: 2022-08-16

## 2022-08-11 RX ORDER — DEXTROSE MONOHYDRATE 50 MG/ML
5-250 INJECTION, SOLUTION INTRAVENOUS PRN
Status: DISCONTINUED | OUTPATIENT
Start: 2022-08-11 | End: 2022-08-12 | Stop reason: HOSPADM

## 2022-08-11 RX ORDER — DIPHENHYDRAMINE HYDROCHLORIDE 50 MG/ML
50 INJECTION INTRAMUSCULAR; INTRAVENOUS
Status: ACTIVE | OUTPATIENT
Start: 2022-08-11 | End: 2022-08-11

## 2022-08-11 RX ORDER — SODIUM CHLORIDE 9 MG/ML
5-250 INJECTION, SOLUTION INTRAVENOUS PRN
OUTPATIENT
Start: 2022-08-16

## 2022-08-11 RX ORDER — HEPARIN SODIUM (PORCINE) LOCK FLUSH IV SOLN 100 UNIT/ML 100 UNIT/ML
500 SOLUTION INTRAVENOUS PRN
OUTPATIENT
Start: 2022-08-16

## 2022-08-11 RX ORDER — DIPHENHYDRAMINE HYDROCHLORIDE 50 MG/ML
50 INJECTION INTRAMUSCULAR; INTRAVENOUS
OUTPATIENT
Start: 2022-08-16

## 2022-08-11 RX ORDER — ACETAMINOPHEN 325 MG/1
650 TABLET ORAL
OUTPATIENT
Start: 2022-08-16

## 2022-08-11 RX ORDER — ONDANSETRON 2 MG/ML
8 INJECTION INTRAMUSCULAR; INTRAVENOUS
OUTPATIENT
Start: 2022-08-16

## 2022-08-11 RX ORDER — EPINEPHRINE 1 MG/ML
0.3 INJECTION, SOLUTION, CONCENTRATE INTRAVENOUS PRN
OUTPATIENT
Start: 2022-08-16

## 2022-08-11 RX ORDER — SODIUM CHLORIDE 9 MG/ML
INJECTION, SOLUTION INTRAVENOUS CONTINUOUS
OUTPATIENT
Start: 2022-08-16

## 2022-08-11 RX ADMIN — LEUCOVORIN CALCIUM 572 MG: 200 INJECTION, POWDER, LYOPHILIZED, FOR SOLUTION INTRAMUSCULAR; INTRAVENOUS at 12:35

## 2022-08-11 RX ADMIN — FERRIC CARBOXYMALTOSE INJECTION 750 MG: 50 INJECTION, SOLUTION INTRAVENOUS at 14:30

## 2022-08-11 RX ADMIN — FLUOROURACIL 3425 MG: 50 INJECTION, SOLUTION INTRAVENOUS at 15:08

## 2022-08-11 RX ADMIN — ATROPINE SULFATE 0.4 MG: 0.4 INJECTION, SOLUTION INTRAMUSCULAR; INTRAVENOUS; SUBCUTANEOUS at 12:30

## 2022-08-11 RX ADMIN — ONDANSETRON 8 MG: 2 INJECTION INTRAMUSCULAR; INTRAVENOUS at 09:22

## 2022-08-11 RX ADMIN — DEXAMETHASONE SODIUM PHOSPHATE 12 MG: 4 INJECTION, SOLUTION INTRAMUSCULAR; INTRAVENOUS at 09:25

## 2022-08-11 RX ADMIN — IRINOTECAN HYDROCHLORIDE 260 MG: 20 INJECTION, SOLUTION INTRAVENOUS at 12:37

## 2022-08-11 RX ADMIN — OXALIPLATIN 120 MG: 5 INJECTION, SOLUTION INTRAVENOUS at 10:25

## 2022-08-11 RX ADMIN — SODIUM CHLORIDE, PRESERVATIVE FREE 10 ML: 5 INJECTION INTRAVENOUS at 09:05

## 2022-08-11 RX ADMIN — FOSAPREPITANT 150 MG: 150 INJECTION, POWDER, LYOPHILIZED, FOR SOLUTION INTRAVENOUS at 09:55

## 2022-08-11 RX ADMIN — DEXTROSE MONOHYDRATE 100 ML/HR: 5 INJECTION, SOLUTION INTRAVENOUS at 09:05

## 2022-08-11 NOTE — PROGRESS NOTES
Clinical Social Work Note  Name: Bill Rudolph    : 1975    MRN: [id number]    Date of Service: 2022    Type of Service: Health and Behavior Intervention     Length of Service: 16 minutes    Patient Diagnosis: Rectal cancer     Referral Source: Infusion RN    Reason for Visit: D1C1    Subject: Seen patient with her supportive  of 12 years. Couple know each other since they were 15years old. They have a 8years old daugther. Patient was tearful, provided therapeutic reassurance. LISW-CP introduced self, presented Support Services Groups, Therapeutic Art Classes and others. LISW-CP discussed Distress by using NCCN Guidelines for Patients' Distress. Gave patient ia book on Distress. Mini Mental Status Exam: Bill Rudolph was dressed properly. No abnormal psychomotor movements observed. Intellectual functioning appeared to be intact. Insight was adequate. Judgment was adequate. Patient did not report suicidal ideations, intent or plans. Speech was coherent. Thought process was clear. Patient did not report homicidal ideations, intent or plans. Patient was oriented to self, place, time and situation. Protective Factors: Current care for physical and mental illness, adequate insight and judgment, family support, cultural and Mormonism beliefs and values that support self-care. Next Steps: SIDNEY-CP gave contact information and encouraged pt to call should any needs arise. Pt verbalized understanding. SIDNEY-CP intends to follow up as needed. No flowsheet data found.         Electronically Signed By:  SIDNEY Lou

## 2022-08-11 NOTE — PROGRESS NOTES
Arrived to the [facility]. D1C1 Folfirinox and 2nd dose completed. Patient tolerated well. Chemo consent verified in chart, education done prior. Any issues or concerns during appointment: patient experienced some dizziness during infusion, but was self-limiting and patient suspected due to not eating. No other issues during infusion. Patient aware of next infusion appointment on 8/13 (date) at 4:30 pm.  Patient instructed to call provider with temperature of 100.4 or greater or nausea/vomiting/ diarrhea or pain not controlled by medications. Discharged ambulatory to home with 5fu pump attached, all connections intact.

## 2022-08-12 ENCOUNTER — CLINICAL DOCUMENTATION (OUTPATIENT)
Dept: CASE MANAGEMENT | Age: 47
End: 2022-08-12

## 2022-08-12 DIAGNOSIS — C20 RECTAL CANCER (HCC): Primary | ICD-10-CM

## 2022-08-12 NOTE — PROGRESS NOTES
I saw patient on 8-10-22 with Dr Miriam Coates prior to chemo start Folfirinox. Pt spoke in [facility] with her  present and Dr Miriam Coates. Pt's  reads and speaks [place]    We reviewed possible side effects such as nausea, vomiting, diarrhea or constipation. Pt has premeds Zofran and Compazine and Emla cream. She verbalizes understanding of use. Pt encouraged to call myself, MyChart or call office with ANY uncontrolled symptoms.  Nurse navigation will be following

## 2022-08-13 ENCOUNTER — HOSPITAL ENCOUNTER (OUTPATIENT)
Dept: INFUSION THERAPY | Age: 47
Discharge: HOME | End: 2022-08-13
Payer: COMMERCIAL

## 2022-08-13 VITALS
DIASTOLIC BLOOD PRESSURE: 47 MMHG | OXYGEN SATURATION: 100 % | TEMPERATURE: 98.3 F | SYSTOLIC BLOOD PRESSURE: 117 MMHG | RESPIRATION RATE: 16 BRPM | HEART RATE: 76 BPM

## 2022-08-13 DIAGNOSIS — C20 RECTAL CANCER (HCC): Primary | ICD-10-CM

## 2022-08-13 PROCEDURE — 96523 IRRIG DRUG DELIVERY DEVICE: CPT

## 2022-08-13 PROCEDURE — 2580000003 HC RX 258: Performed by: INTERNAL MEDICINE

## 2022-08-13 RX ORDER — SODIUM CHLORIDE 0.9 % (FLUSH) 0.9 %
5-40 SYRINGE (ML) INJECTION PRN
Status: DISCONTINUED | OUTPATIENT
Start: 2022-08-13 | End: 2022-08-14 | Stop reason: HOSPADM

## 2022-08-13 RX ADMIN — SODIUM CHLORIDE, PRESERVATIVE FREE 10 ML: 5 INJECTION INTRAVENOUS at 15:35

## 2022-08-13 NOTE — PROGRESS NOTES
Arrived to the [facility]. Assessment and continuous 5FU pump completed. Patient tolerated well. Any issues or concerns during appointment: No.  Patient aware of next infusion appointment on 8/25/22 @0800. Patient instructed to call provider with temperature of 100.4 or greater or nausea/vomiting/ diarrhea or pain not controlled by medications  Discharged ambulatory.

## 2022-08-24 ENCOUNTER — HOSPITAL ENCOUNTER (OUTPATIENT)
Dept: LAB | Age: 47
Discharge: HOME | End: 2022-08-27
Payer: COMMERCIAL

## 2022-08-24 ENCOUNTER — OFFICE VISIT (OUTPATIENT)
Dept: ONCOLOGY | Age: 47
End: 2022-08-24
Payer: COMMERCIAL

## 2022-08-24 VITALS
RESPIRATION RATE: 16 BRPM | TEMPERATURE: 98.1 F | WEIGHT: 108.4 LBS | SYSTOLIC BLOOD PRESSURE: 118 MMHG | BODY MASS INDEX: 20.47 KG/M2 | HEART RATE: 94 BPM | DIASTOLIC BLOOD PRESSURE: 76 MMHG | HEIGHT: 61 IN | OXYGEN SATURATION: 98 %

## 2022-08-24 DIAGNOSIS — C20 RECTAL CANCER (HCC): ICD-10-CM

## 2022-08-24 DIAGNOSIS — Z79.899 HIGH RISK MEDICATION USE: ICD-10-CM

## 2022-08-24 DIAGNOSIS — C20 RECTAL CANCER (HCC): Primary | ICD-10-CM

## 2022-08-24 DIAGNOSIS — R11.0 NAUSEA: ICD-10-CM

## 2022-08-24 LAB
ALBUMIN SERPL-MCNC: 3.4 G/DL (ref 3.5–5)
ALBUMIN/GLOB SERPL: 1.1 {RATIO} (ref 1.2–3.5)
ALP SERPL-CCNC: 65 U/L (ref 50–136)
ALT SERPL-CCNC: 24 U/L (ref 12–65)
ANION GAP SERPL CALC-SCNC: 4 MMOL/L (ref 7–16)
AST SERPL-CCNC: 12 U/L (ref 15–37)
BASOPHILS # BLD: 0 K/UL (ref 0–0.2)
BASOPHILS NFR BLD: 1 % (ref 0–2)
BILIRUB SERPL-MCNC: 0.2 MG/DL (ref 0.2–1.1)
BUN SERPL-MCNC: 14 MG/DL (ref 6–23)
CALCIUM SERPL-MCNC: 8.1 MG/DL (ref 8.3–10.4)
CEA SERPL-MCNC: 7.2 NG/ML (ref 0–3)
CHLORIDE SERPL-SCNC: 107 MMOL/L (ref 98–107)
CO2 SERPL-SCNC: 27 MMOL/L (ref 21–32)
CREAT SERPL-MCNC: 0.6 MG/DL (ref 0.6–1)
DIFFERENTIAL METHOD BLD: ABNORMAL
EOSINOPHIL # BLD: 0.1 K/UL (ref 0–0.8)
EOSINOPHIL NFR BLD: 3 % (ref 0.5–7.8)
ERYTHROCYTE [DISTWIDTH] IN BLOOD BY AUTOMATED COUNT: 17.4 % (ref 11.9–14.6)
GLOBULIN SER CALC-MCNC: 3.2 G/DL (ref 2.3–3.5)
GLUCOSE SERPL-MCNC: 143 MG/DL (ref 65–100)
HCT VFR BLD AUTO: 37.4 % (ref 35.8–46.3)
HGB BLD-MCNC: 12.3 G/DL (ref 11.7–15.4)
IMM GRANULOCYTES # BLD AUTO: 0 K/UL (ref 0–0.5)
IMM GRANULOCYTES NFR BLD AUTO: 1 % (ref 0–5)
LYMPHOCYTES # BLD: 1.1 K/UL (ref 0.5–4.6)
LYMPHOCYTES NFR BLD: 28 % (ref 13–44)
MAGNESIUM SERPL-MCNC: 2.1 MG/DL (ref 1.8–2.4)
MCH RBC QN AUTO: 29.1 PG (ref 26.1–32.9)
MCHC RBC AUTO-ENTMCNC: 32.9 G/DL (ref 31.4–35)
MCV RBC AUTO: 88.6 FL (ref 79.6–97.8)
MONOCYTES # BLD: 0.3 K/UL (ref 0.1–1.3)
MONOCYTES NFR BLD: 9 % (ref 4–12)
NEUTS SEG # BLD: 2.4 K/UL (ref 1.7–8.2)
NEUTS SEG NFR BLD: 59 % (ref 43–78)
NRBC # BLD: 0 K/UL (ref 0–0.2)
PLATELET # BLD AUTO: 156 K/UL (ref 150–450)
PMV BLD AUTO: 11.5 FL (ref 9.4–12.3)
POTASSIUM SERPL-SCNC: 3.4 MMOL/L (ref 3.5–5.1)
PROT SERPL-MCNC: 6.6 G/DL (ref 6.3–8.2)
RBC # BLD AUTO: 4.22 M/UL (ref 4.05–5.2)
SODIUM SERPL-SCNC: 138 MMOL/L (ref 136–145)
WBC # BLD AUTO: 4 K/UL (ref 4.3–11.1)

## 2022-08-24 PROCEDURE — 85025 COMPLETE CBC W/AUTO DIFF WBC: CPT

## 2022-08-24 PROCEDURE — 82378 CARCINOEMBRYONIC ANTIGEN: CPT

## 2022-08-24 PROCEDURE — 99215 OFFICE O/P EST HI 40 MIN: CPT | Performed by: INTERNAL MEDICINE

## 2022-08-24 PROCEDURE — 83735 ASSAY OF MAGNESIUM: CPT

## 2022-08-24 PROCEDURE — 80053 COMPREHEN METABOLIC PANEL: CPT

## 2022-08-24 PROCEDURE — 84703 CHORIONIC GONADOTROPIN ASSAY: CPT

## 2022-08-24 PROCEDURE — 36415 COLL VENOUS BLD VENIPUNCTURE: CPT

## 2022-08-24 RX ORDER — ONDANSETRON 2 MG/ML
8 INJECTION INTRAMUSCULAR; INTRAVENOUS
Status: CANCELLED | OUTPATIENT
Start: 2022-09-08

## 2022-08-24 RX ORDER — HEPARIN SODIUM (PORCINE) LOCK FLUSH IV SOLN 100 UNIT/ML 100 UNIT/ML
500 SOLUTION INTRAVENOUS PRN
Status: CANCELLED | OUTPATIENT
Start: 2022-10-20

## 2022-08-24 RX ORDER — ATROPINE SULFATE 0.4 MG/ML
0.4 AMPUL (ML) INJECTION ONCE
Status: CANCELLED | OUTPATIENT
Start: 2022-09-22 | End: 2022-09-22

## 2022-08-24 RX ORDER — EPINEPHRINE 1 MG/ML
0.3 INJECTION, SOLUTION, CONCENTRATE INTRAVENOUS PRN
Status: CANCELLED | OUTPATIENT
Start: 2022-09-22

## 2022-08-24 RX ORDER — SODIUM CHLORIDE 0.9 % (FLUSH) 0.9 %
5-40 SYRINGE (ML) INJECTION PRN
Status: CANCELLED | OUTPATIENT
Start: 2022-10-22

## 2022-08-24 RX ORDER — SODIUM CHLORIDE 9 MG/ML
5-250 INJECTION, SOLUTION INTRAVENOUS PRN
Status: CANCELLED | OUTPATIENT
Start: 2022-09-08

## 2022-08-24 RX ORDER — SODIUM CHLORIDE 9 MG/ML
5-40 INJECTION INTRAVENOUS PRN
OUTPATIENT
Start: 2022-10-22

## 2022-08-24 RX ORDER — HEPARIN SODIUM (PORCINE) LOCK FLUSH IV SOLN 100 UNIT/ML 100 UNIT/ML
500 SOLUTION INTRAVENOUS PRN
Status: CANCELLED | OUTPATIENT
Start: 2022-10-06

## 2022-08-24 RX ORDER — ONDANSETRON 2 MG/ML
8 INJECTION INTRAMUSCULAR; INTRAVENOUS ONCE
Status: CANCELLED | OUTPATIENT
Start: 2022-10-06 | End: 2022-10-06

## 2022-08-24 RX ORDER — SODIUM CHLORIDE 9 MG/ML
INJECTION, SOLUTION INTRAVENOUS CONTINUOUS
Status: CANCELLED | OUTPATIENT
Start: 2022-09-22

## 2022-08-24 RX ORDER — SODIUM CHLORIDE 0.9 % (FLUSH) 0.9 %
5-40 SYRINGE (ML) INJECTION PRN
Status: CANCELLED | OUTPATIENT
Start: 2022-10-08

## 2022-08-24 RX ORDER — SODIUM CHLORIDE 9 MG/ML
5-40 INJECTION INTRAVENOUS PRN
Status: CANCELLED | OUTPATIENT
Start: 2022-09-08

## 2022-08-24 RX ORDER — SODIUM CHLORIDE 9 MG/ML
5-250 INJECTION, SOLUTION INTRAVENOUS PRN
Status: CANCELLED | OUTPATIENT
Start: 2022-10-20

## 2022-08-24 RX ORDER — DEXTROSE MONOHYDRATE 50 MG/ML
5-250 INJECTION, SOLUTION INTRAVENOUS PRN
Status: CANCELLED | OUTPATIENT
Start: 2022-09-22

## 2022-08-24 RX ORDER — DIPHENHYDRAMINE HYDROCHLORIDE 50 MG/ML
50 INJECTION INTRAMUSCULAR; INTRAVENOUS
Status: CANCELLED | OUTPATIENT
Start: 2022-09-22

## 2022-08-24 RX ORDER — SODIUM CHLORIDE 9 MG/ML
5-250 INJECTION, SOLUTION INTRAVENOUS PRN
Status: CANCELLED | OUTPATIENT
Start: 2022-10-06

## 2022-08-24 RX ORDER — SODIUM CHLORIDE 0.9 % (FLUSH) 0.9 %
5-40 SYRINGE (ML) INJECTION PRN
Status: CANCELLED | OUTPATIENT
Start: 2022-08-25

## 2022-08-24 RX ORDER — SODIUM CHLORIDE 9 MG/ML
5-40 INJECTION INTRAVENOUS PRN
Status: CANCELLED | OUTPATIENT
Start: 2022-09-24

## 2022-08-24 RX ORDER — MEPERIDINE HYDROCHLORIDE 50 MG/ML
12.5 INJECTION INTRAMUSCULAR; INTRAVENOUS; SUBCUTANEOUS PRN
Status: CANCELLED | OUTPATIENT
Start: 2022-09-22

## 2022-08-24 RX ORDER — DEXTROSE MONOHYDRATE 50 MG/ML
5-250 INJECTION, SOLUTION INTRAVENOUS PRN
Status: CANCELLED | OUTPATIENT
Start: 2022-09-08

## 2022-08-24 RX ORDER — SODIUM CHLORIDE 9 MG/ML
INJECTION, SOLUTION INTRAVENOUS CONTINUOUS
Status: CANCELLED | OUTPATIENT
Start: 2022-10-20

## 2022-08-24 RX ORDER — FAMOTIDINE 10 MG/ML
20 INJECTION, SOLUTION INTRAVENOUS
Status: CANCELLED | OUTPATIENT
Start: 2022-08-25

## 2022-08-24 RX ORDER — EPINEPHRINE 1 MG/ML
0.3 INJECTION, SOLUTION, CONCENTRATE INTRAVENOUS PRN
Status: CANCELLED | OUTPATIENT
Start: 2022-08-25

## 2022-08-24 RX ORDER — ATROPINE SULFATE 0.4 MG/ML
0.4 AMPUL (ML) INJECTION ONCE
Status: CANCELLED | OUTPATIENT
Start: 2022-09-08 | End: 2022-09-08

## 2022-08-24 RX ORDER — HEPARIN SODIUM (PORCINE) LOCK FLUSH IV SOLN 100 UNIT/ML 100 UNIT/ML
500 SOLUTION INTRAVENOUS PRN
Status: CANCELLED | OUTPATIENT
Start: 2022-09-22

## 2022-08-24 RX ORDER — MEPERIDINE HYDROCHLORIDE 50 MG/ML
12.5 INJECTION INTRAMUSCULAR; INTRAVENOUS; SUBCUTANEOUS PRN
Status: CANCELLED | OUTPATIENT
Start: 2022-08-25

## 2022-08-24 RX ORDER — ONDANSETRON 2 MG/ML
8 INJECTION INTRAMUSCULAR; INTRAVENOUS ONCE
Status: CANCELLED | OUTPATIENT
Start: 2022-10-20 | End: 2022-10-20

## 2022-08-24 RX ORDER — HEPARIN SODIUM (PORCINE) LOCK FLUSH IV SOLN 100 UNIT/ML 100 UNIT/ML
500 SOLUTION INTRAVENOUS PRN
OUTPATIENT
Start: 2022-10-22

## 2022-08-24 RX ORDER — ACETAMINOPHEN 325 MG/1
650 TABLET ORAL
Status: CANCELLED | OUTPATIENT
Start: 2022-10-20

## 2022-08-24 RX ORDER — DEXTROSE MONOHYDRATE 50 MG/ML
5-250 INJECTION, SOLUTION INTRAVENOUS PRN
Status: CANCELLED | OUTPATIENT
Start: 2022-10-06

## 2022-08-24 RX ORDER — HEPARIN SODIUM (PORCINE) LOCK FLUSH IV SOLN 100 UNIT/ML 100 UNIT/ML
500 SOLUTION INTRAVENOUS PRN
Status: CANCELLED | OUTPATIENT
Start: 2022-09-08

## 2022-08-24 RX ORDER — ATROPINE SULFATE 0.4 MG/ML
0.4 AMPUL (ML) INJECTION
Status: CANCELLED | OUTPATIENT
Start: 2022-08-25

## 2022-08-24 RX ORDER — ONDANSETRON 2 MG/ML
8 INJECTION INTRAMUSCULAR; INTRAVENOUS
Status: CANCELLED | OUTPATIENT
Start: 2022-10-20

## 2022-08-24 RX ORDER — MEPERIDINE HYDROCHLORIDE 50 MG/ML
12.5 INJECTION INTRAMUSCULAR; INTRAVENOUS; SUBCUTANEOUS PRN
Status: CANCELLED | OUTPATIENT
Start: 2022-10-20

## 2022-08-24 RX ORDER — ATROPINE SULFATE 0.4 MG/ML
0.4 AMPUL (ML) INJECTION
Status: CANCELLED | OUTPATIENT
Start: 2022-10-20

## 2022-08-24 RX ORDER — SODIUM CHLORIDE 9 MG/ML
5-250 INJECTION, SOLUTION INTRAVENOUS PRN
Status: CANCELLED | OUTPATIENT
Start: 2022-10-08

## 2022-08-24 RX ORDER — SODIUM CHLORIDE 0.9 % (FLUSH) 0.9 %
5-40 SYRINGE (ML) INJECTION PRN
Status: CANCELLED | OUTPATIENT
Start: 2022-09-22

## 2022-08-24 RX ORDER — ONDANSETRON 2 MG/ML
8 INJECTION INTRAMUSCULAR; INTRAVENOUS
Status: CANCELLED | OUTPATIENT
Start: 2022-10-06

## 2022-08-24 RX ORDER — FAMOTIDINE 10 MG/ML
20 INJECTION, SOLUTION INTRAVENOUS
Status: CANCELLED | OUTPATIENT
Start: 2022-09-22

## 2022-08-24 RX ORDER — SODIUM CHLORIDE 9 MG/ML
5-40 INJECTION INTRAVENOUS PRN
Status: CANCELLED | OUTPATIENT
Start: 2022-08-25

## 2022-08-24 RX ORDER — ACETAMINOPHEN 325 MG/1
650 TABLET ORAL
Status: CANCELLED | OUTPATIENT
Start: 2022-09-08

## 2022-08-24 RX ORDER — ONDANSETRON 2 MG/ML
8 INJECTION INTRAMUSCULAR; INTRAVENOUS
Status: CANCELLED | OUTPATIENT
Start: 2022-09-22

## 2022-08-24 RX ORDER — EPINEPHRINE 1 MG/ML
0.3 INJECTION, SOLUTION, CONCENTRATE INTRAVENOUS PRN
Status: CANCELLED | OUTPATIENT
Start: 2022-10-06

## 2022-08-24 RX ORDER — SODIUM CHLORIDE 9 MG/ML
INJECTION, SOLUTION INTRAVENOUS CONTINUOUS
Status: CANCELLED | OUTPATIENT
Start: 2022-09-08

## 2022-08-24 RX ORDER — ACETAMINOPHEN 325 MG/1
650 TABLET ORAL
Status: CANCELLED | OUTPATIENT
Start: 2022-10-06

## 2022-08-24 RX ORDER — FAMOTIDINE 10 MG/ML
20 INJECTION, SOLUTION INTRAVENOUS
Status: CANCELLED | OUTPATIENT
Start: 2022-10-20

## 2022-08-24 RX ORDER — MEPERIDINE HYDROCHLORIDE 50 MG/ML
12.5 INJECTION INTRAMUSCULAR; INTRAVENOUS; SUBCUTANEOUS PRN
Status: CANCELLED | OUTPATIENT
Start: 2022-09-08

## 2022-08-24 RX ORDER — SODIUM CHLORIDE 9 MG/ML
5-250 INJECTION, SOLUTION INTRAVENOUS PRN
Status: CANCELLED | OUTPATIENT
Start: 2022-09-10

## 2022-08-24 RX ORDER — HEPARIN SODIUM (PORCINE) LOCK FLUSH IV SOLN 100 UNIT/ML 100 UNIT/ML
500 SOLUTION INTRAVENOUS PRN
Status: CANCELLED | OUTPATIENT
Start: 2022-08-25

## 2022-08-24 RX ORDER — ALBUTEROL SULFATE 90 UG/1
4 AEROSOL, METERED RESPIRATORY (INHALATION) PRN
Status: CANCELLED | OUTPATIENT
Start: 2022-09-22

## 2022-08-24 RX ORDER — SODIUM CHLORIDE 9 MG/ML
5-40 INJECTION INTRAVENOUS PRN
Status: CANCELLED | OUTPATIENT
Start: 2022-08-27

## 2022-08-24 RX ORDER — SODIUM CHLORIDE 9 MG/ML
5-250 INJECTION, SOLUTION INTRAVENOUS PRN
Status: CANCELLED | OUTPATIENT
Start: 2022-08-27

## 2022-08-24 RX ORDER — ACETAMINOPHEN 325 MG/1
650 TABLET ORAL
Status: CANCELLED | OUTPATIENT
Start: 2022-08-25

## 2022-08-24 RX ORDER — ONDANSETRON 2 MG/ML
8 INJECTION INTRAMUSCULAR; INTRAVENOUS ONCE
Status: CANCELLED | OUTPATIENT
Start: 2022-09-22 | End: 2022-09-22

## 2022-08-24 RX ORDER — SODIUM CHLORIDE 9 MG/ML
INJECTION, SOLUTION INTRAVENOUS CONTINUOUS
Status: CANCELLED | OUTPATIENT
Start: 2022-08-25

## 2022-08-24 RX ORDER — ALBUTEROL SULFATE 90 UG/1
4 AEROSOL, METERED RESPIRATORY (INHALATION) PRN
Status: CANCELLED | OUTPATIENT
Start: 2022-10-06

## 2022-08-24 RX ORDER — FAMOTIDINE 10 MG/ML
20 INJECTION, SOLUTION INTRAVENOUS
Status: CANCELLED | OUTPATIENT
Start: 2022-10-06

## 2022-08-24 RX ORDER — SODIUM CHLORIDE 9 MG/ML
5-250 INJECTION, SOLUTION INTRAVENOUS PRN
Status: CANCELLED | OUTPATIENT
Start: 2022-08-25

## 2022-08-24 RX ORDER — ATROPINE SULFATE 0.4 MG/ML
0.4 AMPUL (ML) INJECTION
Status: CANCELLED | OUTPATIENT
Start: 2022-09-08

## 2022-08-24 RX ORDER — SODIUM CHLORIDE 9 MG/ML
5-40 INJECTION INTRAVENOUS PRN
Status: CANCELLED | OUTPATIENT
Start: 2022-09-22

## 2022-08-24 RX ORDER — SODIUM CHLORIDE 0.9 % (FLUSH) 0.9 %
5-40 SYRINGE (ML) INJECTION PRN
Status: CANCELLED | OUTPATIENT
Start: 2022-10-06

## 2022-08-24 RX ORDER — ATROPINE SULFATE 0.4 MG/ML
0.4 AMPUL (ML) INJECTION ONCE
Status: CANCELLED | OUTPATIENT
Start: 2022-10-20 | End: 2022-10-20

## 2022-08-24 RX ORDER — MEPERIDINE HYDROCHLORIDE 50 MG/ML
12.5 INJECTION INTRAMUSCULAR; INTRAVENOUS; SUBCUTANEOUS PRN
Status: CANCELLED | OUTPATIENT
Start: 2022-10-06

## 2022-08-24 RX ORDER — SODIUM CHLORIDE 0.9 % (FLUSH) 0.9 %
5-40 SYRINGE (ML) INJECTION PRN
Status: CANCELLED | OUTPATIENT
Start: 2022-10-20

## 2022-08-24 RX ORDER — DIPHENHYDRAMINE HYDROCHLORIDE 50 MG/ML
50 INJECTION INTRAMUSCULAR; INTRAVENOUS
Status: CANCELLED | OUTPATIENT
Start: 2022-09-08

## 2022-08-24 RX ORDER — ALBUTEROL SULFATE 90 UG/1
4 AEROSOL, METERED RESPIRATORY (INHALATION) PRN
Status: CANCELLED | OUTPATIENT
Start: 2022-08-25

## 2022-08-24 RX ORDER — SODIUM CHLORIDE 9 MG/ML
5-40 INJECTION INTRAVENOUS PRN
Status: CANCELLED | OUTPATIENT
Start: 2022-09-10

## 2022-08-24 RX ORDER — ONDANSETRON 2 MG/ML
8 INJECTION INTRAMUSCULAR; INTRAVENOUS
Status: CANCELLED | OUTPATIENT
Start: 2022-08-25

## 2022-08-24 RX ORDER — DEXTROSE MONOHYDRATE 50 MG/ML
5-250 INJECTION, SOLUTION INTRAVENOUS PRN
Status: CANCELLED | OUTPATIENT
Start: 2022-08-25

## 2022-08-24 RX ORDER — EPINEPHRINE 1 MG/ML
0.3 INJECTION, SOLUTION, CONCENTRATE INTRAVENOUS PRN
Status: CANCELLED | OUTPATIENT
Start: 2022-10-20

## 2022-08-24 RX ORDER — ATROPINE SULFATE 0.4 MG/ML
0.4 AMPUL (ML) INJECTION
Status: CANCELLED | OUTPATIENT
Start: 2022-10-06

## 2022-08-24 RX ORDER — DEXTROSE MONOHYDRATE 50 MG/ML
5-250 INJECTION, SOLUTION INTRAVENOUS PRN
Status: CANCELLED | OUTPATIENT
Start: 2022-10-20

## 2022-08-24 RX ORDER — EPINEPHRINE 1 MG/ML
0.3 INJECTION, SOLUTION, CONCENTRATE INTRAVENOUS PRN
Status: CANCELLED | OUTPATIENT
Start: 2022-09-08

## 2022-08-24 RX ORDER — ACETAMINOPHEN 325 MG/1
650 TABLET ORAL
Status: CANCELLED | OUTPATIENT
Start: 2022-09-22

## 2022-08-24 RX ORDER — ALBUTEROL SULFATE 90 UG/1
4 AEROSOL, METERED RESPIRATORY (INHALATION) PRN
Status: CANCELLED | OUTPATIENT
Start: 2022-09-08

## 2022-08-24 RX ORDER — SODIUM CHLORIDE 0.9 % (FLUSH) 0.9 %
5-40 SYRINGE (ML) INJECTION PRN
Status: CANCELLED | OUTPATIENT
Start: 2022-09-10

## 2022-08-24 RX ORDER — SODIUM CHLORIDE 0.9 % (FLUSH) 0.9 %
5-40 SYRINGE (ML) INJECTION PRN
Status: CANCELLED | OUTPATIENT
Start: 2022-09-08

## 2022-08-24 RX ORDER — ATROPINE SULFATE 0.4 MG/ML
0.4 AMPUL (ML) INJECTION ONCE
Status: CANCELLED | OUTPATIENT
Start: 2022-10-06 | End: 2022-10-06

## 2022-08-24 RX ORDER — SODIUM CHLORIDE 9 MG/ML
5-40 INJECTION INTRAVENOUS PRN
Status: CANCELLED | OUTPATIENT
Start: 2022-10-20

## 2022-08-24 RX ORDER — SODIUM CHLORIDE 0.9 % (FLUSH) 0.9 %
5-40 SYRINGE (ML) INJECTION PRN
Status: CANCELLED | OUTPATIENT
Start: 2022-08-27

## 2022-08-24 RX ORDER — ONDANSETRON 2 MG/ML
8 INJECTION INTRAMUSCULAR; INTRAVENOUS ONCE
Status: CANCELLED | OUTPATIENT
Start: 2022-08-25 | End: 2022-08-25

## 2022-08-24 RX ORDER — DIPHENHYDRAMINE HYDROCHLORIDE 50 MG/ML
50 INJECTION INTRAMUSCULAR; INTRAVENOUS
Status: CANCELLED | OUTPATIENT
Start: 2022-10-06

## 2022-08-24 RX ORDER — ATROPINE SULFATE 0.4 MG/ML
0.4 AMPUL (ML) INJECTION
Status: CANCELLED | OUTPATIENT
Start: 2022-09-22

## 2022-08-24 RX ORDER — ALBUTEROL SULFATE 90 UG/1
4 AEROSOL, METERED RESPIRATORY (INHALATION) PRN
Status: CANCELLED | OUTPATIENT
Start: 2022-10-20

## 2022-08-24 RX ORDER — HEPARIN SODIUM (PORCINE) LOCK FLUSH IV SOLN 100 UNIT/ML 100 UNIT/ML
500 SOLUTION INTRAVENOUS PRN
Status: CANCELLED | OUTPATIENT
Start: 2022-09-10

## 2022-08-24 RX ORDER — SODIUM CHLORIDE 9 MG/ML
INJECTION, SOLUTION INTRAVENOUS CONTINUOUS
Status: CANCELLED | OUTPATIENT
Start: 2022-10-06

## 2022-08-24 RX ORDER — ATROPINE SULFATE 0.4 MG/ML
0.4 AMPUL (ML) INJECTION ONCE
Status: CANCELLED | OUTPATIENT
Start: 2022-08-25 | End: 2022-08-25

## 2022-08-24 RX ORDER — HEPARIN SODIUM (PORCINE) LOCK FLUSH IV SOLN 100 UNIT/ML 100 UNIT/ML
500 SOLUTION INTRAVENOUS PRN
Status: CANCELLED | OUTPATIENT
Start: 2022-09-24

## 2022-08-24 RX ORDER — ONDANSETRON 2 MG/ML
8 INJECTION INTRAMUSCULAR; INTRAVENOUS ONCE
Status: CANCELLED | OUTPATIENT
Start: 2022-09-08 | End: 2022-09-08

## 2022-08-24 RX ORDER — SODIUM CHLORIDE 9 MG/ML
5-40 INJECTION INTRAVENOUS PRN
Status: CANCELLED | OUTPATIENT
Start: 2022-10-06

## 2022-08-24 RX ORDER — SODIUM CHLORIDE 9 MG/ML
5-250 INJECTION, SOLUTION INTRAVENOUS PRN
Status: CANCELLED | OUTPATIENT
Start: 2022-09-24

## 2022-08-24 RX ORDER — SODIUM CHLORIDE 0.9 % (FLUSH) 0.9 %
5-40 SYRINGE (ML) INJECTION PRN
Status: CANCELLED | OUTPATIENT
Start: 2022-09-24

## 2022-08-24 RX ORDER — DIPHENHYDRAMINE HYDROCHLORIDE 50 MG/ML
50 INJECTION INTRAMUSCULAR; INTRAVENOUS
Status: CANCELLED | OUTPATIENT
Start: 2022-10-20

## 2022-08-24 RX ORDER — FAMOTIDINE 10 MG/ML
20 INJECTION, SOLUTION INTRAVENOUS
Status: CANCELLED | OUTPATIENT
Start: 2022-09-08

## 2022-08-24 RX ORDER — SODIUM CHLORIDE 9 MG/ML
5-250 INJECTION, SOLUTION INTRAVENOUS PRN
Status: CANCELLED | OUTPATIENT
Start: 2022-09-22

## 2022-08-24 RX ORDER — HEPARIN SODIUM (PORCINE) LOCK FLUSH IV SOLN 100 UNIT/ML 100 UNIT/ML
500 SOLUTION INTRAVENOUS PRN
Status: CANCELLED | OUTPATIENT
Start: 2022-10-08

## 2022-08-24 RX ORDER — SODIUM CHLORIDE 9 MG/ML
5-250 INJECTION, SOLUTION INTRAVENOUS PRN
OUTPATIENT
Start: 2022-10-22

## 2022-08-24 RX ORDER — DIPHENHYDRAMINE HYDROCHLORIDE 50 MG/ML
50 INJECTION INTRAMUSCULAR; INTRAVENOUS
Status: CANCELLED | OUTPATIENT
Start: 2022-08-25

## 2022-08-24 RX ORDER — SODIUM CHLORIDE 9 MG/ML
5-40 INJECTION INTRAVENOUS PRN
Status: CANCELLED | OUTPATIENT
Start: 2022-10-08

## 2022-08-24 RX ORDER — HEPARIN SODIUM (PORCINE) LOCK FLUSH IV SOLN 100 UNIT/ML 100 UNIT/ML
500 SOLUTION INTRAVENOUS PRN
Status: CANCELLED | OUTPATIENT
Start: 2022-08-27

## 2022-08-24 ASSESSMENT — ANXIETY QUESTIONNAIRES
GAD7 TOTAL SCORE: 0
IF YOU CHECKED OFF ANY PROBLEMS ON THIS QUESTIONNAIRE, HOW DIFFICULT HAVE THESE PROBLEMS MADE IT FOR YOU TO DO YOUR WORK, TAKE CARE OF THINGS AT HOME, OR GET ALONG WITH OTHER PEOPLE: NOT DIFFICULT AT ALL
4. TROUBLE RELAXING: 0
5. BEING SO RESTLESS THAT IT IS HARD TO SIT STILL: 0
3. WORRYING TOO MUCH ABOUT DIFFERENT THINGS: 0
7. FEELING AFRAID AS IF SOMETHING AWFUL MIGHT HAPPEN: 0
1. FEELING NERVOUS, ANXIOUS, OR ON EDGE: 0
2. NOT BEING ABLE TO STOP OR CONTROL WORRYING: 0
6. BECOMING EASILY ANNOYED OR IRRITABLE: 0

## 2022-08-24 ASSESSMENT — PATIENT HEALTH QUESTIONNAIRE - PHQ9
SUM OF ALL RESPONSES TO PHQ QUESTIONS 1-9: 1
SUM OF ALL RESPONSES TO PHQ QUESTIONS 1-9: 1
9. THOUGHTS THAT YOU WOULD BE BETTER OFF DEAD, OR OF HURTING YOURSELF: 0
5. POOR APPETITE OR OVEREATING: 0
SUM OF ALL RESPONSES TO PHQ QUESTIONS 1-9: 1
2. FEELING DOWN, DEPRESSED OR HOPELESS: 0
3. TROUBLE FALLING OR STAYING ASLEEP: 0
6. FEELING BAD ABOUT YOURSELF - OR THAT YOU ARE A FAILURE OR HAVE LET YOURSELF OR YOUR FAMILY DOWN: 0
4. FEELING TIRED OR HAVING LITTLE ENERGY: 1
8. MOVING OR SPEAKING SO SLOWLY THAT OTHER PEOPLE COULD HAVE NOTICED. OR THE OPPOSITE, BEING SO FIGETY OR RESTLESS THAT YOU HAVE BEEN MOVING AROUND A LOT MORE THAN USUAL: 0
1. LITTLE INTEREST OR PLEASURE IN DOING THINGS: 0
SUM OF ALL RESPONSES TO PHQ9 QUESTIONS 1 & 2: 0
SUM OF ALL RESPONSES TO PHQ QUESTIONS 1-9: 1
7. TROUBLE CONCENTRATING ON THINGS, SUCH AS READING THE NEWSPAPER OR WATCHING TELEVISION: 0

## 2022-08-24 NOTE — PATIENT INSTRUCTIONS
Patient Instructions from Today's Visit    Reason for Visit:  Prechemo visit    Diagnosis Information:  [email or web address]/. net/about-us/asco-answers-patient-education-materials/kfru-malelpr-pqtp-sheets      Plan:      Follow Up:  As scheduled    Recent Lab Results:  Hospital Outpatient Visit on 08/24/2022   Component Date Value Ref Range Status    WBC 08/24/2022 4.0 (A) 4.3 - 11.1 K/uL Final    RBC 08/24/2022 4.22  4.05 - 5.2 M/uL Final    Hemoglobin 08/24/2022 12.3  11.7 - 15.4 g/dL Final    Hematocrit 08/24/2022 37.4  35.8 - 46.3 % Final    MCV 08/24/2022 88.6  79.6 - 97.8 FL Final    MCH 08/24/2022 29.1  26.1 - 32.9 PG Final    MCHC 08/24/2022 32.9  31.4 - 35.0 g/dL Final    RDW 08/24/2022 17.4 (A) 11.9 - 14.6 % Final    Platelets 48/24/8827 156  150 - 450 K/uL Final    MPV 08/24/2022 11.5  9.4 - 12.3 FL Final    nRBC 08/24/2022 0.00  0.0 - 0.2 K/uL Final    **Note: Absolute NRBC parameter is now reported with Hemogram**    Differential Type 08/24/2022 AUTOMATED    Final    Seg Neutrophils 08/24/2022 59  43 - 78 % Final    Lymphocytes 08/24/2022 28  13 - 44 % Final    Monocytes 08/24/2022 9  4.0 - 12.0 % Final    Eosinophils % 08/24/2022 3  0.5 - 7.8 % Final    Basophils 08/24/2022 1  0.0 - 2.0 % Final    Immature Granulocytes 08/24/2022 1  0.0 - 5.0 % Final    Segs Absolute 08/24/2022 2.4  1.7 - 8.2 K/UL Final    Absolute Lymph # 08/24/2022 1.1  0.5 - 4.6 K/UL Final    Absolute Mono # 08/24/2022 0.3  0.1 - 1.3 K/UL Final    Absolute Eos # 08/24/2022 0.1  0.0 - 0.8 K/UL Final    Basophils Absolute 08/24/2022 0.0  0.0 - 0.2 K/UL Final    Absolute Immature Granulocyte 08/24/2022 0.0  0.0 - 0.5 K/UL Final    Sodium 08/24/2022 138  136 - 145 mmol/L Final    Potassium 08/24/2022 3.4 (A) 3.5 - 5.1 mmol/L Final    Chloride 08/24/2022 107  98 - 107 mmol/L Final    CO2 08/24/2022 27  21 - 32 mmol/L Final    Anion Gap 08/24/2022 4 (A) 7 - 16 mmol/L Final    Glucose 08/24/2022 143 (A) 65 - 100 mg/dL Final    BUN 08/24/2022 14  6 - 23 MG/DL Final    Creatinine 08/24/2022 0.60  0.6 - 1.0 MG/DL Final    GFR  08/24/2022 >60  >60 ml/min/1.73m2 Final    GFR Non- 08/24/2022 >60  >60 ml/min/1.73m2 Final    Comment:      Estimated GFR is calculated using the Modification of Diet in Renal Disease (MDRD) Study equation, reported for both  Americans (GFRAA) and non- Americans (GFRNA), and normalized to 1.73m2 body surface area. The physician must decide which value applies to the patient. The MDRD study equation should only be used in individuals age 25 or older. It has not been validated for the following: pregnant women, patients with serious comorbid conditions,or on certain medications, or persons with extremes of body size, muscle mass, or nutritional status. Calcium 08/24/2022 8.1 (A) 8.3 - 10.4 MG/DL Final    Total Bilirubin 08/24/2022 0.2  0.2 - 1.1 MG/DL Final    ALT 08/24/2022 24  12 - 65 U/L Final    AST 08/24/2022 12 (A) 15 - 37 U/L Final    Alk Phosphatase 08/24/2022 65  50 - 136 U/L Final    Total Protein 08/24/2022 6.6  6.3 - 8.2 g/dL Final    Albumin 08/24/2022 3.4 (A) 3.5 - 5.0 g/dL Final    Globulin 08/24/2022 3.2  2.3 - 3.5 g/dL Final    Albumin/Globulin Ratio 08/24/2022 1.1 (A) 1.2 - 3.5   Final    Magnesium 08/24/2022 2.1  1.8 - 2.4 mg/dL Final    CEA 08/24/2022 7.2 (A) 0.0 - 3.0 ng/mL Final    Comment: Nonsmoker:  <3.0 ng/mL  Smoker:     <5.0 ng/mL  [employer]. Patient's results of tumor marker testing may not be comparable to labs using different manufacturers/methods.           Treatment Summary has been discussed and given to patient: n/a        -------------------------------------------------------------------------------------------------------------------  Please call our office at [phone] if you have any  of the following symptoms:   Fever of 100.5 or greater  Chills  Shortness of breath  Swelling or pain in one leg    After office hours an answering service is available and will contact a provider for emergencies or if you are experiencing any of the above symptoms. Patient does express an interest in My Chart. My Chart log in information explained on the after visit summary printout at the [facility] [address] desk.     Harshad Yanez RN  Nurse Navigator  [address] [facility] [address] [phone]

## 2022-08-24 NOTE — PROGRESS NOTES
[facility] Hematology & Oncology: Office Visit Progress Note    Chief Complaint:    Rectal cancer    History of Present Illness:  Reason for Referral: Rectal cancer     Referring Provider:  Yenifer Paul MD     Primary Care Provider: Veronica Negrete DO     Family History of Cancer/Hematologic Disorders: None noted     Presenting Symptoms: LLQ abdominal pain     Ms. Agnieszka Crouch is a 51 y.o.  female who reports to have never used tobacco products. She denies use of alcohol or drug substances. Her medical history reports as rectal bleeding. Her surgical history reports as  and colonoscopy. On 7/15/22, Ms. Agnieszka Crouch saw Dr Darel Peabody for her newly diagnosed rectal cancer. She reported to have had rectal bleeding for 2 months which prompted her first colonoscopy. A 5 cm half circumferential friable mass was identified at the most proximal valve of Singh, biopsy revealed at least carcinoma in situ. 2022 CT scan of c/a/p demonstrated no distal metastases. She reported intermittent left lower quadrant abdominal pain for years. She has EUS scheduled for August however Dr Darel Peabody reached out to Dr Shital Darling to move up her planned EUS. A referral was placed to hematology for consideration of neoadjuvant chemoradiation. Currently there is no referral to radiation oncology. Her 2022 CBC and CMP reported WNL except for a low creatinine of 0.47.     22 Colonoscopy         2022 Surgical Pathology  DIAGNOSIS  A:  \"RECTAL MASS BIOPSIES\":        FRAGMENTS OF AT LEAST IN SITU ADENOCARCINOMA. SEE COMMENT. COMMENT: BECAUSE BIOPSY FRAGMENTS ARE LIMITED PRIMARIALY TO MUCOSA,   SUBMUCOSAL INFILTRATION CANNOT BE RULED OUT   B:  \"RANDOM COLON BIOPSIES\":        FRAGMENTS OF HISTOLOGICALLY UNREMARKABLE LARGE INTESTINE   C:  \"ASCENDING COLON POLYP\":        FRAGMENT OF MIXED TUBULOVILLOUS ADENOMA     22 CT Chest Abdomen and Pelvis  FINDINGS:  Lungs: No pulmonary nodules. No airspace disease.   Airways: Trachea and proximal bronchi grossly patent. Pleura: No effusion or thickening or calcifications. Lymph Nodes: No enlarged axillary, hilar or mediastinal lymph nodes. Heart: Normal size. Coronaries: No definite calcifications. Aorta: Normal caliber. Esophagus: Thickening of the distal esophagus  Skeletal/Chest Wall: No gross bony lesions. Liver: A 4 mm cyst anterior segment right lobe, axial image 47, otherwise  unremarkable. Gallbladder: No gallstones identified  Bile Ducts: Not dilated. Pancreas: Unremarkable. Spleen: Uniform and normal size. Stomach: Unremarkable. Bowel: There is mural thickening of the rectum, probably a cyst 6 cm in length. Margins are indistinct. Small nodular densities probably represent mesorectal  node. Adrenals: Are normal size. Kidneys/Ureters: Enhance symmetrically. No hydronephrosis. Lymph Nodes: No grossly enlarged retroperitoneal, mesenteric, or pelvic  adenopathy. Vasculature: Aorta is normal caliber. Bones: No gross bony lesions. Other: No ascites. Impression  Mural thickening of the rectum suspicious for neoplasm. Margins are  indistinct. Probable small mesorectal lymph nodes. There is one small cyst in  liver. No pulmonary nodules. PET [id number]:  1. Hypermetabolic rectal mass consistent with known rectal malignancy. No   evidence of metastatic disease in the neck, chest, abdomen or pelvis. Small   perirectal lymph nodes are present but may be too small to accurately   characterize by PET imaging. No FDG activity noted in these nodes. Review of Systems:  Constitutional Denies fever or chills. Denies weight loss or appetite changes. Denies fatigue. Denies anorexia. HEENT Denies trauma, bluring vision, hearing loss, ear pain, nosebleeds, sore throat, neck pain and ear discharge. Skin Denies lesions or rashes. Lungs Denies shortness of breath, cough, sputum production or hemoptysis. Cardiovascular Denies chest pain, palpitations, orthopnea, claudication and leg swelling. Gastrointestinal Blood in stool. Denies nausea, vomiting, bowel changes. Denies abdominal pain.  Denies dysuria, frequency or hesitancy of urination   Neuro Denies headaches, visual changes or ataxia. Denies dizziness, tingling, tremors, sensory change, speech change, focal weakness and headaches. Hematology Denies nasal/gum bleeding, denies easy bruise   Endo Denies heat/cold intolerance, denies diabetes. MSK Denies back pain, swollen legs, myalgias and falls. Psychiatric/Behavioral Denies depression and substance abuse. The patient is not nervous/anxious.        Allergies   Allergen Reactions    Amoxicillin Rash     Past Medical History:   Diagnosis Date    Cancer ([facility] [address]     rectal cancer-- dx 2022---- followed by dr Armaan Schaffer-- per spouse after scans/test  then will determine what the plan is    Rectal bleeding     onset 2022     Past Surgical History:   Procedure Laterality Date     SECTION      COLONOSCOPY      COLONOSCOPY N/A 2022    COLONOSCOPY ULTRASOUND/ BMI 19 performed by Ellen Tyson MD at [facility] ENDOSCOPY    IR PORT PLACEMENT EQUAL OR GREATER THAN 5 YEARS  2022    IR PORT PLACEMENT EQUAL OR GREATER THAN 5 YEARS 2022 [facility] SPECIALS     Family History   Problem Relation Age of Onset    High Blood Pressure Mother      Social History     Socioeconomic History    Marital status:      Spouse name: Not on file    Number of children: Not on file    Years of education: Not on file    Highest education level: Not on file   Occupational History    Not on file   Tobacco Use    Smoking status: Never    Smokeless tobacco: Never   Vaping Use    Vaping Use: Never used   Substance and Sexual Activity    Alcohol use: Yes     Comment: rare    Drug use: Never    Sexual activity: Not on file   Other Topics Concern    Not on file   Social History Narrative    Not on file     Social Determinants of Health     Financial Resource Strain: Not on file   Food Insecurity: Not on file   Transportation Needs: Not on file   Physical Activity: Not on file   Stress: Not on file   Social Connections: Not on file   Intimate Partner Violence: Not on file   Housing Stability: Not on file     Current Outpatient Medications   Medication Sig Dispense Refill    lidocaine-prilocaine (EMLA) 2.5-2.5 % cream Apply topically as needed. Place small amount to port site 45 minutes prior to blood draws and/or infusion. Cover with plastic wrap 30 g 2    prochlorperazine (COMPAZINE) 10 MG tablet Take 1 tablet by mouth every 6 hours as needed (nausea or vomiting) 120 tablet 1    ondansetron (ZOFRAN) 4 MG tablet Take 1 tablet by mouth every 8 hours as needed for Nausea or Vomiting 90 tablet 1    Multiple Vitamin (MULTI-VITAMIN PO) Take 1 tablet by mouth daily (Patient not taking: No sig reported)       No current facility-administered medications for this visit. OBJECTIVE:  /76   Pulse 94   Temp 98.1 °F (36.7 °C)   Resp 16   Ht 5' 1\" (1.549 m)   Wt 108 lb 6.4 oz (49.2 kg)   SpO2 98%   BMI 20.48 kg/m²     Physical Exam:  Constitutional: Oriented to person, place, and time. Well-developed and well-nourished. HEENT: Normocephalic and atraumatic. Oropharynx is clear and moist.   Conjunctivae and EOM are normal. Pupils are equal, round, and reactive to light. No scleral icterus. Neck supple. No JVD present. No tracheal deviation present. No thyromegaly present. Lymph node No palpable submandibular, cervical, supraclavicular, axillary and inguinal lymph nodes. Skin Warm and dry. No bruising and no rash noted. No erythema. No pallor. Respiratory Effort normal and breath sounds normal.  No respiratory distress. No wheezes. No rales. No tenderness. CVS Normal rate, regular rhythm and normal heart sounds. Exam reveals no gallop, no friction and no rub. No murmur heard. Abdomen Soft. Bowel sounds are normal. Exhibits no distension. There is no tenderness.  There is no rebound and no guarding. Neuro Normal reflexes. No cranial nerve deficit. Exhibits normal muscle tone, 5 of 5 strength of all extremities. MSK Normal range of motion in general.  No edema and no tenderness.    Psych Normal mood, affect, behavior, judgment and thought content      Labs:  Recent Results (from the past 24 hour(s))   CBC with Auto Differential    Collection Time: 08/24/22  2:25 PM   Result Value Ref Range    WBC 4.0 (L) 4.3 - 11.1 K/uL    RBC 4.22 4.05 - 5.2 M/uL    Hemoglobin 12.3 11.7 - 15.4 g/dL    Hematocrit 37.4 35.8 - 46.3 %    MCV 88.6 79.6 - 97.8 FL    MCH 29.1 26.1 - 32.9 PG    MCHC 32.9 31.4 - 35.0 g/dL    RDW 17.4 (H) 11.9 - 14.6 %    Platelets 013 676 - 442 K/uL    MPV 11.5 9.4 - 12.3 FL    nRBC 0.00 0.0 - 0.2 K/uL    Differential Type AUTOMATED      Seg Neutrophils 59 43 - 78 %    Lymphocytes 28 13 - 44 %    Monocytes 9 4.0 - 12.0 %    Eosinophils % 3 0.5 - 7.8 %    Basophils 1 0.0 - 2.0 %    Immature Granulocytes 1 0.0 - 5.0 %    Segs Absolute 2.4 1.7 - 8.2 K/UL    Absolute Lymph # 1.1 0.5 - 4.6 K/UL    Absolute Mono # 0.3 0.1 - 1.3 K/UL    Absolute Eos # 0.1 0.0 - 0.8 K/UL    Basophils Absolute 0.0 0.0 - 0.2 K/UL    Absolute Immature Granulocyte 0.0 0.0 - 0.5 K/UL   Comprehensive Metabolic Panel    Collection Time: 08/24/22  2:25 PM   Result Value Ref Range    Sodium 138 136 - 145 mmol/L    Potassium 3.4 (L) 3.5 - 5.1 mmol/L    Chloride 107 98 - 107 mmol/L    CO2 27 21 - 32 mmol/L    Anion Gap 4 (L) 7 - 16 mmol/L    Glucose 143 (H) 65 - 100 mg/dL    BUN 14 6 - 23 MG/DL    Creatinine 0.60 0.6 - 1.0 MG/DL    GFR African American >60 >60 ml/min/1.73m2    GFR Non- >60 >60 ml/min/1.73m2    Calcium 8.1 (L) 8.3 - 10.4 MG/DL    Total Bilirubin 0.2 0.2 - 1.1 MG/DL    ALT 24 12 - 65 U/L    AST 12 (L) 15 - 37 U/L    Alk Phosphatase 65 50 - 136 U/L    Total Protein 6.6 6.3 - 8.2 g/dL    Albumin 3.4 (L) 3.5 - 5.0 g/dL    Globulin 3.2 2.3 - 3.5 g/dL    Albumin/Globulin Ratio 1.1 (L) 1.2 - 3.5     Magnesium    Collection Time: 08/24/22  2:25 PM   Result Value Ref Range    Magnesium 2.1 1.8 - 2.4 mg/dL   CEA    Collection Time: 08/24/22  2:25 PM   Result Value Ref Range    CEA 7.2 (H) 0.0 - 3.0 ng/mL       Imaging:  No results found for this or any previous visit. ASSESSMENT/PLAN:   Diagnosis Orders   1. Rectal cancer (HCC)  CBC With Auto Differential    Comprehensive metabolic panel    CBC With Auto Differential    Comprehensive metabolic panel    CBC With Auto Differential    Comprehensive metabolic panel    CBC With Auto Differential    Comprehensive metabolic panel    CBC With Auto Differential    Comprehensive metabolic panel      2. High risk medication use        3. Nausea                51 y.o. F urgently consulted for rectal carcinoma.   She had good general baseline health, developed frequency of bowel movement and blood in stool for 2 months, colonoscopy showed low rectal adenocarcinoma and urgently consulted to oncology, discussed carcinogenesis and staging, urgent arrangement of EUS reported T3N0 disease but quite large avid disease on PET with small lymph nodes, discussed at tumor board and concern of level of local invasion, arrange rectal MRI, discussed neoadjuvant treatment options with patient and wife and desired to treat aggressively for maximal chance of CR, arrange JENNIFER in the manner of Prodige 23 (Neoadjuvant rectal cancer treatment with JENNIFER using three months of modified FOLFIRINOX (oxaliplatin 85 mg/m2, leucovorin 400 mg/m2, irinotecan 180 mg/m2 day 1, and FU 2400 mg/m2 over 46 hours every 14 days) followed by long-course CRT (50 Gy in 25 fractions plus concurrent capecitabine), TME, and three additional months of FOLFOX or capecitabine), we discussed toxicities and management, arrange port placement, antiemetics, Emla, IV iron, rectal MRI confirmed chief receive M0 disease, proceed to cycle 1 neoadjuvant FOLFIRINOX 8/11/2022, generally tolerated well, 4 bowel movements a day but not watery, there was left lower quadrant pain, nausea, discussed management accordingly, labs reviewed and CEA down, proceed to cycle 2, address all the nutrition questions, return in 2 weeks but call as needed. All questions are answered to their satisfaction. They will call for further questions and concerns. ECOG PERFORMANCE STATUS - 0-Fully active, able to carry on all pre-disease performance without restriction. Pain - 0 - No pain/10. None/Minimal pain - not affecting QOL     Fatigue - No flowsheet data found. Distress - No flowsheet data found. Elements of this note have been dictated via voice recognition software. Text and phrases may be limited by the accuracy and autoconversion of the software. The chart has been reviewed, but errors may still be present. Antoni Melendez M.D.   [address]  Office : [phone]  Fax : [phone]

## 2022-08-25 ENCOUNTER — CLINICAL DOCUMENTATION (OUTPATIENT)
Dept: ONCOLOGY | Age: 47
End: 2022-08-25

## 2022-08-25 ENCOUNTER — HOSPITAL ENCOUNTER (OUTPATIENT)
Dept: INFUSION THERAPY | Age: 47
Discharge: HOME | End: 2022-08-25
Payer: COMMERCIAL

## 2022-08-25 VITALS
HEART RATE: 81 BPM | SYSTOLIC BLOOD PRESSURE: 110 MMHG | RESPIRATION RATE: 16 BRPM | WEIGHT: 108 LBS | DIASTOLIC BLOOD PRESSURE: 74 MMHG | OXYGEN SATURATION: 99 % | BODY MASS INDEX: 20.41 KG/M2 | TEMPERATURE: 98.7 F

## 2022-08-25 DIAGNOSIS — C20 RECTAL CANCER (HCC): Primary | ICD-10-CM

## 2022-08-25 LAB — HCG SERPL QL: NEGATIVE

## 2022-08-25 PROCEDURE — 96368 THER/DIAG CONCURRENT INF: CPT

## 2022-08-25 PROCEDURE — 96417 CHEMO IV INFUS EACH ADDL SEQ: CPT

## 2022-08-25 PROCEDURE — 6360000002 HC RX W HCPCS: Performed by: INTERNAL MEDICINE

## 2022-08-25 PROCEDURE — 96413 CHEMO IV INFUSION 1 HR: CPT

## 2022-08-25 PROCEDURE — 96372 THER/PROPH/DIAG INJ SC/IM: CPT

## 2022-08-25 PROCEDURE — 96415 CHEMO IV INFUSION ADDL HR: CPT

## 2022-08-25 PROCEDURE — 96375 TX/PRO/DX INJ NEW DRUG ADDON: CPT

## 2022-08-25 PROCEDURE — 2580000003 HC RX 258: Performed by: INTERNAL MEDICINE

## 2022-08-25 PROCEDURE — 96367 TX/PROPH/DG ADDL SEQ IV INF: CPT

## 2022-08-25 PROCEDURE — G0498 CHEMO EXTEND IV INFUS W/PUMP: HCPCS

## 2022-08-25 RX ORDER — DEXTROSE MONOHYDRATE 50 MG/ML
5-250 INJECTION, SOLUTION INTRAVENOUS PRN
Status: DISCONTINUED | OUTPATIENT
Start: 2022-08-25 | End: 2022-08-26 | Stop reason: HOSPADM

## 2022-08-25 RX ORDER — ATROPINE SULFATE 0.4 MG/ML
0.4 AMPUL (ML) INJECTION ONCE
Status: COMPLETED | OUTPATIENT
Start: 2022-08-25 | End: 2022-08-25

## 2022-08-25 RX ORDER — SODIUM CHLORIDE 0.9 % (FLUSH) 0.9 %
5-40 SYRINGE (ML) INJECTION PRN
Status: DISCONTINUED | OUTPATIENT
Start: 2022-08-25 | End: 2022-08-26 | Stop reason: HOSPADM

## 2022-08-25 RX ORDER — ATROPINE SULFATE 0.4 MG/ML
0.4 AMPUL (ML) INJECTION
Status: DISCONTINUED | OUTPATIENT
Start: 2022-08-25 | End: 2022-08-26 | Stop reason: HOSPADM

## 2022-08-25 RX ORDER — ONDANSETRON 2 MG/ML
8 INJECTION INTRAMUSCULAR; INTRAVENOUS ONCE
Status: COMPLETED | OUTPATIENT
Start: 2022-08-25 | End: 2022-08-25

## 2022-08-25 RX ADMIN — DEXAMETHASONE SODIUM PHOSPHATE 12 MG: 4 INJECTION, SOLUTION INTRAMUSCULAR; INTRAVENOUS at 09:20

## 2022-08-25 RX ADMIN — FOSAPREPITANT 150 MG: 150 INJECTION, POWDER, LYOPHILIZED, FOR SOLUTION INTRAVENOUS at 09:50

## 2022-08-25 RX ADMIN — ONDANSETRON 8 MG: 2 INJECTION INTRAMUSCULAR; INTRAVENOUS at 09:18

## 2022-08-25 RX ADMIN — FLUOROURACIL 3425 MG: 50 INJECTION, SOLUTION INTRAVENOUS at 14:10

## 2022-08-25 RX ADMIN — OXALIPLATIN 120 MG: 5 INJECTION, SOLUTION INTRAVENOUS at 10:23

## 2022-08-25 RX ADMIN — ATROPINE SULFATE 0.4 MG: 0.4 INJECTION, SOLUTION INTRAMUSCULAR; INTRAVENOUS; SUBCUTANEOUS at 12:26

## 2022-08-25 RX ADMIN — SODIUM CHLORIDE, PRESERVATIVE FREE 10 ML: 5 INJECTION INTRAVENOUS at 08:50

## 2022-08-25 RX ADMIN — LEUCOVORIN CALCIUM 572 MG: 200 INJECTION, POWDER, LYOPHILIZED, FOR SOLUTION INTRAMUSCULAR; INTRAVENOUS at 12:33

## 2022-08-25 RX ADMIN — IRINOTECAN HYDROCHLORIDE 260 MG: 20 INJECTION, SOLUTION INTRAVENOUS at 12:33

## 2022-08-25 RX ADMIN — DEXTROSE MONOHYDRATE 25 ML/HR: 5 INJECTION, SOLUTION INTRAVENOUS at 08:50

## 2022-08-25 NOTE — PROGRESS NOTES
Clinical Social Work Note  Name: Janae Phelps    : 1975    MRN: [id number]    Date of Service: 2022    Type of Service: Health and Behavior Intervention     Length of Service: 16 minutes    Patient Diagnosis: No diagnosis found. Referral Source: Infusion RN    Reason for Visit: F/U    CM Note: Seen patient with her . SIDNEY-CP provided therapeutic reassurance. Gave information on Support Group and [employer] Wednesday. Mini Mental Status Exam: Janae Phelps was dressed properly. No abnormal psychomotor movements observed. Intellectual functioning appeared to be intact. Insight was adequate. Judgment was adequate. Patient did not report suicidal ideations, intent or plans. Speech was coherent. Thought process was clear. Patient did not report homicidal ideations, intent or plans. Patient was oriented to self, place, time and situation. Protective Factors: Current care for physical and mental illness, adequate insight and judgment, family support, cultural and Mandaen beliefs and values that support self-care. Next Steps: SIDNEY-CARINA gave contact information and encouraged pt to call should any needs arise. Pt verbalized understanding. SIDNEY -CP intends to follow up as needed. No flowsheet data found.         Electronically Signed By:  SIDNEY Renteria

## 2022-08-25 NOTE — PROGRESS NOTES
Arrived to the [facility]. Folfirinox infusions and pump connect completed. Patient tolerated well. Any issues or concerns during appointment: no.  Patient aware of next infusion appointment on 8/27/2022 (date) at  (time). Patient aware of next lab and [facility] office visit on 9/7/2022 (date) at 1400 (time). Patient instructed to call provider with temperature of 100.4 or greater or nausea/vomiting/ diarrhea or pain not controlled by medications  Discharged ambulatory.

## 2022-08-27 ENCOUNTER — HOSPITAL ENCOUNTER (OUTPATIENT)
Dept: INFUSION THERAPY | Age: 47
Discharge: HOME | End: 2022-08-27
Payer: COMMERCIAL

## 2022-08-27 VITALS
DIASTOLIC BLOOD PRESSURE: 61 MMHG | RESPIRATION RATE: 18 BRPM | OXYGEN SATURATION: 94 % | HEART RATE: 88 BPM | SYSTOLIC BLOOD PRESSURE: 103 MMHG | TEMPERATURE: 97.9 F

## 2022-08-27 DIAGNOSIS — C20 RECTAL CANCER (HCC): Primary | ICD-10-CM

## 2022-08-27 PROCEDURE — 2580000003 HC RX 258: Performed by: INTERNAL MEDICINE

## 2022-08-27 PROCEDURE — 96523 IRRIG DRUG DELIVERY DEVICE: CPT

## 2022-08-27 RX ORDER — SODIUM CHLORIDE 9 MG/ML
5-40 INJECTION INTRAVENOUS PRN
Status: DISCONTINUED | OUTPATIENT
Start: 2022-08-27 | End: 2022-08-28 | Stop reason: HOSPADM

## 2022-08-27 RX ADMIN — SODIUM CHLORIDE, PRESERVATIVE FREE 10 ML: 5 INJECTION INTRAVENOUS at 15:40

## 2022-08-27 ASSESSMENT — PAIN DESCRIPTION - ONSET: ONSET: ON-GOING

## 2022-08-27 ASSESSMENT — PAIN DESCRIPTION - DESCRIPTORS: DESCRIPTORS: DISCOMFORT

## 2022-08-27 ASSESSMENT — PAIN DESCRIPTION - FREQUENCY: FREQUENCY: INTERMITTENT

## 2022-08-27 ASSESSMENT — PAIN DESCRIPTION - PAIN TYPE: TYPE: CHRONIC PAIN

## 2022-08-27 ASSESSMENT — PAIN - FUNCTIONAL ASSESSMENT: PAIN_FUNCTIONAL_ASSESSMENT: ACTIVITIES ARE NOT PREVENTED

## 2022-08-27 ASSESSMENT — PAIN DESCRIPTION - LOCATION: LOCATION: ABDOMEN

## 2022-08-27 ASSESSMENT — PAIN SCALES - GENERAL: PAINLEVEL_OUTOF10: 4

## 2022-08-27 ASSESSMENT — PAIN DESCRIPTION - ORIENTATION: ORIENTATION: MID

## 2022-08-27 NOTE — PROGRESS NOTES
Arrived to the [facility]. Assessment completed . 5 FU Elastomeric pump completed. Patient tolerated without problems. Any issues or concerns during appointment: None  Patient instructed to call provider with temperature of 100.4 or greater or nausea/vomiting/ diarrhea or pain not controlled by medications  Instructed to call Dr Kendra Agudelo  with any side effects or concerns  Patient aware of next infusion appointment on 9/8/22(date) at 8 AM (time).   Discharged ambulatory with spouse

## 2022-09-07 ENCOUNTER — HOSPITAL ENCOUNTER (OUTPATIENT)
Dept: LAB | Age: 47
Discharge: HOME | End: 2022-09-10
Payer: COMMERCIAL

## 2022-09-07 ENCOUNTER — CLINICAL DOCUMENTATION (OUTPATIENT)
Dept: CASE MANAGEMENT | Age: 47
End: 2022-09-07

## 2022-09-07 ENCOUNTER — OFFICE VISIT (OUTPATIENT)
Dept: ONCOLOGY | Age: 47
End: 2022-09-07
Payer: COMMERCIAL

## 2022-09-07 VITALS
SYSTOLIC BLOOD PRESSURE: 121 MMHG | OXYGEN SATURATION: 96 % | DIASTOLIC BLOOD PRESSURE: 79 MMHG | HEART RATE: 85 BPM | BODY MASS INDEX: 20.62 KG/M2 | RESPIRATION RATE: 16 BRPM | HEIGHT: 61 IN | WEIGHT: 109.2 LBS | TEMPERATURE: 98.6 F

## 2022-09-07 DIAGNOSIS — K62.5 RECTAL BLEEDING: ICD-10-CM

## 2022-09-07 DIAGNOSIS — C20 RECTAL CANCER (HCC): Primary | ICD-10-CM

## 2022-09-07 DIAGNOSIS — T45.1X5A CHEMOTHERAPY-INDUCED NAUSEA: ICD-10-CM

## 2022-09-07 DIAGNOSIS — R11.0 CHEMOTHERAPY-INDUCED NAUSEA: ICD-10-CM

## 2022-09-07 DIAGNOSIS — R53.83 FATIGUE DUE TO TREATMENT: ICD-10-CM

## 2022-09-07 DIAGNOSIS — C20 RECTAL CANCER (HCC): ICD-10-CM

## 2022-09-07 LAB
ALBUMIN SERPL-MCNC: 3.3 G/DL (ref 3.5–5)
ALBUMIN/GLOB SERPL: 1 {RATIO} (ref 1.2–3.5)
ALP SERPL-CCNC: 62 U/L (ref 50–136)
ALT SERPL-CCNC: 24 U/L (ref 12–65)
ANION GAP SERPL CALC-SCNC: 4 MMOL/L (ref 4–13)
AST SERPL-CCNC: 10 U/L (ref 15–37)
BASOPHILS # BLD: 0 K/UL (ref 0–0.2)
BASOPHILS NFR BLD: 0 % (ref 0–2)
BILIRUB SERPL-MCNC: 0.2 MG/DL (ref 0.2–1.1)
BUN SERPL-MCNC: 15 MG/DL (ref 6–23)
CALCIUM SERPL-MCNC: 8.7 MG/DL (ref 8.3–10.4)
CHLORIDE SERPL-SCNC: 108 MMOL/L (ref 101–110)
CO2 SERPL-SCNC: 28 MMOL/L (ref 21–32)
CREAT SERPL-MCNC: 0.5 MG/DL (ref 0.6–1)
DIFFERENTIAL METHOD BLD: ABNORMAL
EOSINOPHIL # BLD: 0.1 K/UL (ref 0–0.8)
EOSINOPHIL NFR BLD: 3 % (ref 0.5–7.8)
ERYTHROCYTE [DISTWIDTH] IN BLOOD BY AUTOMATED COUNT: 17.8 % (ref 11.9–14.6)
GLOBULIN SER CALC-MCNC: 3.3 G/DL (ref 2.3–3.5)
GLUCOSE SERPL-MCNC: 120 MG/DL (ref 65–100)
HCT VFR BLD AUTO: 37.6 % (ref 35.8–46.3)
HGB BLD-MCNC: 12.2 G/DL (ref 11.7–15.4)
IMM GRANULOCYTES # BLD AUTO: 0 K/UL (ref 0–0.5)
IMM GRANULOCYTES NFR BLD AUTO: 0 % (ref 0–5)
LYMPHOCYTES # BLD: 1.4 K/UL (ref 0.5–4.6)
LYMPHOCYTES NFR BLD: 31 % (ref 13–44)
MCH RBC QN AUTO: 29.6 PG (ref 26.1–32.9)
MCHC RBC AUTO-ENTMCNC: 32.4 G/DL (ref 31.4–35)
MCV RBC AUTO: 91.3 FL (ref 79.6–97.8)
MONOCYTES # BLD: 0.5 K/UL (ref 0.1–1.3)
MONOCYTES NFR BLD: 11 % (ref 4–12)
NEUTS SEG # BLD: 2.4 K/UL (ref 1.7–8.2)
NEUTS SEG NFR BLD: 55 % (ref 43–78)
NRBC # BLD: 0 K/UL (ref 0–0.2)
PLATELET # BLD AUTO: 144 K/UL (ref 150–450)
PMV BLD AUTO: 11.2 FL (ref 9.4–12.3)
POTASSIUM SERPL-SCNC: 3.8 MMOL/L (ref 3.5–5.1)
PROT SERPL-MCNC: 6.6 G/DL (ref 6.3–8.2)
RBC # BLD AUTO: 4.12 M/UL (ref 4.05–5.2)
SODIUM SERPL-SCNC: 140 MMOL/L (ref 136–145)
WBC # BLD AUTO: 4.5 K/UL (ref 4.3–11.1)

## 2022-09-07 PROCEDURE — 99214 OFFICE O/P EST MOD 30 MIN: CPT | Performed by: NURSE PRACTITIONER

## 2022-09-07 PROCEDURE — 36415 COLL VENOUS BLD VENIPUNCTURE: CPT

## 2022-09-07 PROCEDURE — 80053 COMPREHEN METABOLIC PANEL: CPT

## 2022-09-07 PROCEDURE — 85025 COMPLETE CBC W/AUTO DIFF WBC: CPT

## 2022-09-07 ASSESSMENT — PATIENT HEALTH QUESTIONNAIRE - PHQ9
SUM OF ALL RESPONSES TO PHQ QUESTIONS 1-9: 0
SUM OF ALL RESPONSES TO PHQ9 QUESTIONS 1 & 2: 0
8. MOVING OR SPEAKING SO SLOWLY THAT OTHER PEOPLE COULD HAVE NOTICED. OR THE OPPOSITE, BEING SO FIGETY OR RESTLESS THAT YOU HAVE BEEN MOVING AROUND A LOT MORE THAN USUAL: 0
5. POOR APPETITE OR OVEREATING: 0
SUM OF ALL RESPONSES TO PHQ QUESTIONS 1-9: 0
9. THOUGHTS THAT YOU WOULD BE BETTER OFF DEAD, OR OF HURTING YOURSELF: 0
3. TROUBLE FALLING OR STAYING ASLEEP: 0
6. FEELING BAD ABOUT YOURSELF - OR THAT YOU ARE A FAILURE OR HAVE LET YOURSELF OR YOUR FAMILY DOWN: 0
SUM OF ALL RESPONSES TO PHQ QUESTIONS 1-9: 0
4. FEELING TIRED OR HAVING LITTLE ENERGY: 0
1. LITTLE INTEREST OR PLEASURE IN DOING THINGS: 0
SUM OF ALL RESPONSES TO PHQ QUESTIONS 1-9: 0
2. FEELING DOWN, DEPRESSED OR HOPELESS: 0
7. TROUBLE CONCENTRATING ON THINGS, SUCH AS READING THE NEWSPAPER OR WATCHING TELEVISION: 0

## 2022-09-07 ASSESSMENT — ANXIETY QUESTIONNAIRES
5. BEING SO RESTLESS THAT IT IS HARD TO SIT STILL: 0
2. NOT BEING ABLE TO STOP OR CONTROL WORRYING: 0
GAD7 TOTAL SCORE: 0
6. BECOMING EASILY ANNOYED OR IRRITABLE: 0
7. FEELING AFRAID AS IF SOMETHING AWFUL MIGHT HAPPEN: 0
1. FEELING NERVOUS, ANXIOUS, OR ON EDGE: 0
3. WORRYING TOO MUCH ABOUT DIFFERENT THINGS: 0
IF YOU CHECKED OFF ANY PROBLEMS ON THIS QUESTIONNAIRE, HOW DIFFICULT HAVE THESE PROBLEMS MADE IT FOR YOU TO DO YOUR WORK, TAKE CARE OF THINGS AT HOME, OR GET ALONG WITH OTHER PEOPLE: NOT DIFFICULT AT ALL
4. TROUBLE RELAXING: 0

## 2022-09-07 NOTE — PATIENT INSTRUCTIONS
Patient Instructions from Today's Visit    Reason for Visit:  Pre chemo cycle 3 FOLFIRINOX    Plan:  Infusion tomorrow    Follow Up:  2 weeks    Recent Lab Results:  Hospital Outpatient Visit on 09/07/2022   Component Date Value Ref Range Status    WBC 09/07/2022 4.5  4.3 - 11.1 K/uL Final    RBC 09/07/2022 4.12  4.05 - 5.2 M/uL Final    Hemoglobin 09/07/2022 12.2  11.7 - 15.4 g/dL Final    Hematocrit 09/07/2022 37.6  35.8 - 46.3 % Final    MCV 09/07/2022 91.3  79.6 - 97.8 FL Final    MCH 09/07/2022 29.6  26.1 - 32.9 PG Final    MCHC 09/07/2022 32.4  31.4 - 35.0 g/dL Final    RDW 09/07/2022 17.8 (A) 11.9 - 14.6 % Final    Platelets 17/85/3670 144 (A) 150 - 450 K/uL Final    MPV 09/07/2022 11.2  9.4 - 12.3 FL Final    nRBC 09/07/2022 0.00  0.0 - 0.2 K/uL Final    **Note: Absolute NRBC parameter is now reported with Hemogram**    Seg Neutrophils 09/07/2022 55  43 - 78 % Final    Lymphocytes 09/07/2022 31  13 - 44 % Final    Monocytes 09/07/2022 11  4.0 - 12.0 % Final    Eosinophils % 09/07/2022 3  0.5 - 7.8 % Final    Basophils 09/07/2022 0  0.0 - 2.0 % Final    Immature Granulocytes 09/07/2022 0  0.0 - 5.0 % Final    Segs Absolute 09/07/2022 2.4  1.7 - 8.2 K/UL Final    Absolute Lymph # 09/07/2022 1.4  0.5 - 4.6 K/UL Final    Absolute Mono # 09/07/2022 0.5  0.1 - 1.3 K/UL Final    Absolute Eos # 09/07/2022 0.1  0.0 - 0.8 K/UL Final    Basophils Absolute 09/07/2022 0.0  0.0 - 0.2 K/UL Final    Absolute Immature Granulocyte 09/07/2022 0.0  0.0 - 0.5 K/UL Final    Differential Type 09/07/2022 AUTOMATED    Final    Sodium 09/07/2022 PENDING  mmol/L Incomplete    Potassium 09/07/2022 PENDING  mmol/L Incomplete    Chloride 09/07/2022 PENDING  mmol/L Incomplete    CO2 09/07/2022 PENDING  mmol/L Incomplete    Anion Gap 09/07/2022 PENDING  mmol/L Incomplete    Glucose 09/07/2022 120 (A) 65 - 100 mg/dL Final    BUN 09/07/2022 15  6 - 23 MG/DL Final    Creatinine 09/07/2022 0.50 (A) 0.6 - 1.0 MG/DL Final    GFR  American 09/07/2022 >60  >60 ml/min/1.73m2 Final    GFR Non- 09/07/2022 >60  >60 ml/min/1.73m2 Final    Comment:      Estimated GFR is calculated using the Modification of Diet in Renal Disease (MDRD) Study equation, reported for both  Americans (GFRAA) and non- Americans (GFRNA), and normalized to 1.73m2 body surface area. The physician must decide which value applies to the patient. The MDRD study equation should only be used in individuals age 25 or older. It has not been validated for the following: pregnant women, patients with serious comorbid conditions,or on certain medications, or persons with extremes of body size, muscle mass, or nutritional status. Calcium 09/07/2022 8.7  8.3 - 10.4 MG/DL Final    Total Bilirubin 09/07/2022 0.2  0.2 - 1.1 MG/DL Final    ALT 09/07/2022 24  12 - 65 U/L Final    AST 09/07/2022 10 (A) 15 - 37 U/L Final    Alk Phosphatase 09/07/2022 62  50 - 136 U/L Final    Total Protein 09/07/2022 6.6  6.3 - 8.2 g/dL Final    Albumin 09/07/2022 3.3 (A) 3.5 - 5.0 g/dL Final    Globulin 09/07/2022 3.3  2.3 - 3.5 g/dL Final    Albumin/Globulin Ratio 09/07/2022 1.0 (A) 1.2 - 3.5   Final         Treatment Summary has been discussed and given to patient: no        -------------------------------------------------------------------------------------------------------------------  Please call our office at [phone] if you have any  of the following symptoms:   Fever of 100.5 or greater  Chills  Shortness of breath  Swelling or pain in one leg    After office hours an answering service is available and will contact a provider for emergencies or if you are experiencing any of the above symptoms. Patient did express an interest in My Chart. My Chart log in information explained on the after visit summary printout at the [facility] [address] desk.     Joanne Fox RN    Ander Edwards, RN  Nurse Navigator  [phone]  [phone]

## 2022-09-07 NOTE — PROGRESS NOTES
9/7/22 saw pt today with FLAKO Bose for pre chemo cycle 3 FOLFIRINOX. Zachary Ramirez #[id number]  services online for visit. She is tolerating chemo well. PO intake is good. Cold sensitivity lasting about 1 week. Symptoms well controlled. Infusion tomorrow. Follow up in 2 weeks. Encouraged to call with any concerns. Navigation will continue to follow.

## 2022-09-07 NOTE — PROGRESS NOTES
[address] [place] [address] Hematology & Oncology: Office Visit Progress Note    Chief Complaint:    Rectal cancer    History of Present Illness:  Reason for Referral: Rectal cancer     Referring Provider:  Vianca Ji MD     Primary Care Provider: Zoila Vazquez DO     Family History of Cancer/Hematologic Disorders: None noted     Presenting Symptoms: LLQ abdominal pain     Ms. Esther Whitaker is a 51 y.o.  female who reports to have never used tobacco products. She denies use of alcohol or drug substances. Her medical history reports as rectal bleeding. Her surgical history reports as  and colonoscopy. On 7/15/22, Ms. Esther Whitaker saw Dr Corby Russell for her newly diagnosed rectal cancer. She reported to have had rectal bleeding for 2 months which prompted her first colonoscopy. A 5 cm half circumferential friable mass was identified at the most proximal valve of Singh, biopsy revealed at least carcinoma in situ. 2022 CT scan of c/a/p demonstrated no distal metastases. She reported intermittent left lower quadrant abdominal pain for years. She has EUS scheduled for August however Dr Corby Russell reached out to Dr Eric Wheeler to move up her planned EUS. A referral was placed to hematology for consideration of neoadjuvant chemoradiation. Currently there is no referral to radiation oncology. Her 2022 CBC and CMP reported WNL except for a low creatinine of 0.47.     22 Colonoscopy         2022 Surgical Pathology  DIAGNOSIS  A:  \"RECTAL MASS BIOPSIES\":        FRAGMENTS OF AT LEAST IN SITU ADENOCARCINOMA. SEE COMMENT. COMMENT: BECAUSE BIOPSY FRAGMENTS ARE LIMITED PRIMARIALY TO MUCOSA,   SUBMUCOSAL INFILTRATION CANNOT BE RULED OUT   B:  \"RANDOM COLON BIOPSIES\":        FRAGMENTS OF HISTOLOGICALLY UNREMARKABLE LARGE INTESTINE   C:  \"ASCENDING COLON POLYP\":        FRAGMENT OF MIXED TUBULOVILLOUS ADENOMA     22 CT Chest Abdomen and Pelvis  FINDINGS:  Lungs: No pulmonary nodules. No airspace disease.   Airways: Trachea and proximal bronchi grossly patent. Pleura: No effusion or thickening or calcifications. Lymph Nodes: No enlarged axillary, hilar or mediastinal lymph nodes. Heart: Normal size. Coronaries: No definite calcifications. Aorta: Normal caliber. Esophagus: Thickening of the distal esophagus  Skeletal/Chest Wall: No gross bony lesions. Liver: A 4 mm cyst anterior segment right lobe, axial image 47, otherwise  unremarkable. Gallbladder: No gallstones identified  Bile Ducts: Not dilated. Pancreas: Unremarkable. Spleen: Uniform and normal size. Stomach: Unremarkable. Bowel: There is mural thickening of the rectum, probably a cyst 6 cm in length. Margins are indistinct. Small nodular densities probably represent mesorectal  node. Adrenals: Are normal size. Kidneys/Ureters: Enhance symmetrically. No hydronephrosis. Lymph Nodes: No grossly enlarged retroperitoneal, mesenteric, or pelvic  adenopathy. Vasculature: Aorta is normal caliber. Bones: No gross bony lesions. Other: No ascites. Impression  Mural thickening of the rectum suspicious for neoplasm. Margins are  indistinct. Probable small mesorectal lymph nodes. There is one small cyst in  liver. No pulmonary nodules. PET [id number]:  1. Hypermetabolic rectal mass consistent with known rectal malignancy. No   evidence of metastatic disease in the neck, chest, abdomen or pelvis. Small   perirectal lymph nodes are present but may be too small to accurately   characterize by PET imaging. No FDG activity noted in these nodes. Review of Systems:  Constitutional +fatigue and decreased appetite-1st week Denies fever or chills. Denies weight loss. Denies anorexia. HEENT Denies trauma, bluring vision, hearing loss, ear pain, nosebleeds, sore throat, neck pain and ear discharge. Skin Denies lesions or rashes. Lungs Denies shortness of breath, cough, sputum production or hemoptysis. Cardiovascular Denies chest pain, palpitations, orthopnea, claudication and leg swelling. Gastrointestinal Blood in stool, nausea. Denies  vomiting, bowel changes. Denies abdominal pain.  Denies dysuria, frequency or hesitancy of urination   Neuro Denies headaches, visual changes or ataxia. Denies dizziness, tingling, tremors, sensory change, speech change, focal weakness and headaches. Hematology Denies nasal/gum bleeding, denies easy bruise   Endo Denies heat/cold intolerance, denies diabetes. MSK Denies back pain, swollen legs, myalgias and falls. Psychiatric/Behavioral Denies depression and substance abuse. The patient is not nervous/anxious.        Allergies   Allergen Reactions    Amoxicillin Rash     Past Medical History:   Diagnosis Date    Cancer ([facility] [address]     rectal cancer-- dx 2022---- followed by dr Kinjal Mo-- per spouse after scans/test  then will determine what the plan is    Rectal bleeding     onset 2022     Past Surgical History:   Procedure Laterality Date     SECTION      COLONOSCOPY      COLONOSCOPY N/A 2022    COLONOSCOPY ULTRASOUND/ BMI 19 performed by Leonila Kwon MD at [facility] ENDOSCOPY    IR PORT PLACEMENT EQUAL OR GREATER THAN 5 YEARS  2022    IR PORT PLACEMENT EQUAL OR GREATER THAN 5 YEARS 2022 [facility] SPECIALS     Family History   Problem Relation Age of Onset    High Blood Pressure Mother      Social History     Socioeconomic History    Marital status:      Spouse name: Not on file    Number of children: Not on file    Years of education: Not on file    Highest education level: Not on file   Occupational History    Not on file   Tobacco Use    Smoking status: Never    Smokeless tobacco: Never   Vaping Use    Vaping Use: Never used   Substance and Sexual Activity    Alcohol use: Yes     Comment: rare    Drug use: Never    Sexual activity: Not on file   Other Topics Concern    Not on file   Social History Narrative    Not on file     Social Determinants of Health     Financial Resource Strain: Not on file   Food Insecurity: Not on file   Transportation Needs: Not on file   Physical Activity: Not on file   Stress: Not on file   Social Connections: Not on file   Intimate Partner Violence: Not on file   Housing Stability: Not on file     Current Outpatient Medications   Medication Sig Dispense Refill    lidocaine-prilocaine (EMLA) 2.5-2.5 % cream Apply topically as needed. Place small amount to port site 45 minutes prior to blood draws and/or infusion. Cover with plastic wrap 30 g 2    prochlorperazine (COMPAZINE) 10 MG tablet Take 1 tablet by mouth every 6 hours as needed (nausea or vomiting) 120 tablet 1    ondansetron (ZOFRAN) 4 MG tablet Take 1 tablet by mouth every 8 hours as needed for Nausea or Vomiting 90 tablet 1    Multiple Vitamin (MULTI-VITAMIN PO) Take 1 tablet by mouth daily (Patient not taking: No sig reported)       No current facility-administered medications for this visit. OBJECTIVE:  /79   Pulse 85   Temp 98.6 °F (37 °C)   Resp 16   Ht 5' 1\" (1.549 m)   Wt 109 lb 3.2 oz (49.5 kg)   SpO2 96%   BMI 20.63 kg/m²     Physical Exam:  Constitutional: Oriented to person, place, and time. Well-developed and well-nourished. HEENT: Normocephalic and atraumatic. Oropharynx is clear and moist.   Conjunctivae and EOM are normal. No scleral icterus. Neck supple. No JVD present. No tracheal deviation present. No thyromegaly present. Lymph node Deferred   Skin Warm and dry. No bruising and no rash noted. No erythema. No pallor. Respiratory Effort normal and breath sounds normal.  No respiratory distress. No wheezes. No rales. No tenderness. CVS Normal rate, regular rhythm and normal heart sounds. Exam reveals no gallop, no friction and no rub. No murmur heard. Abdomen Soft. Bowel sounds are normal. Exhibits no distension. There is no tenderness. There is no rebound and no guarding. Neuro No cranial nerve deficit. Exhibits normal muscle tone.    MSK Normal range of motion in general.  No edema and no tenderness. Psych Normal mood, affect, behavior, judgment and thought content      Labs:  Recent Results (from the past 24 hour(s))   CBC With Auto Differential    Collection Time: 09/07/22  1:57 PM   Result Value Ref Range    WBC 4.5 4.3 - 11.1 K/uL    RBC 4.12 4.05 - 5.2 M/uL    Hemoglobin 12.2 11.7 - 15.4 g/dL    Hematocrit 37.6 35.8 - 46.3 %    MCV 91.3 79.6 - 97.8 FL    MCH 29.6 26.1 - 32.9 PG    MCHC 32.4 31.4 - 35.0 g/dL    RDW 17.8 (H) 11.9 - 14.6 %    Platelets 653 (L) 650 - 450 K/uL    MPV 11.2 9.4 - 12.3 FL    nRBC 0.00 0.0 - 0.2 K/uL    Seg Neutrophils 55 43 - 78 %    Lymphocytes 31 13 - 44 %    Monocytes 11 4.0 - 12.0 %    Eosinophils % 3 0.5 - 7.8 %    Basophils 0 0.0 - 2.0 %    Immature Granulocytes 0 0.0 - 5.0 %    Segs Absolute 2.4 1.7 - 8.2 K/UL    Absolute Lymph # 1.4 0.5 - 4.6 K/UL    Absolute Mono # 0.5 0.1 - 1.3 K/UL    Absolute Eos # 0.1 0.0 - 0.8 K/UL    Basophils Absolute 0.0 0.0 - 0.2 K/UL    Absolute Immature Granulocyte 0.0 0.0 - 0.5 K/UL    Differential Type AUTOMATED     Comprehensive metabolic panel    Collection Time: 09/07/22  1:57 PM   Result Value Ref Range    Sodium 140 136 - 145 mmol/L    Potassium 3.8 3.5 - 5.1 mmol/L    Chloride 108 101 - 110 mmol/L    CO2 28 21 - 32 mmol/L    Anion Gap 4 4 - 13 mmol/L    Glucose 120 (H) 65 - 100 mg/dL    BUN 15 6 - 23 MG/DL    Creatinine 0.50 (L) 0.6 - 1.0 MG/DL    GFR African American >60 >60 ml/min/1.73m2    GFR Non- >60 >60 ml/min/1.73m2    Calcium 8.7 8.3 - 10.4 MG/DL    Total Bilirubin 0.2 0.2 - 1.1 MG/DL    ALT 24 12 - 65 U/L    AST 10 (L) 15 - 37 U/L    Alk Phosphatase 62 50 - 136 U/L    Total Protein 6.6 6.3 - 8.2 g/dL    Albumin 3.3 (L) 3.5 - 5.0 g/dL    Globulin 3.3 2.3 - 3.5 g/dL    Albumin/Globulin Ratio 1.0 (L) 1.2 - 3.5         Imaging:  No results found for this or any previous visit. ASSESSMENT/PLAN:   Diagnosis Orders   1.  Rectal cancer ([facility] [address]  CBC with Auto Differential    Comprehensive Metabolic Panel    Magnesium    CEA      2. Chemotherapy-induced nausea        3. Fatigue due to treatment        4. Rectal bleeding                51 y.o. F urgently consulted for rectal carcinoma. She had good general baseline health, developed frequency of bowel movement and blood in stool for 2 months, colonoscopy showed low rectal adenocarcinoma and urgently consulted to oncology, discussed carcinogenesis and staging, urgent arrangement of EUS reported T3N0 disease but quite large avid disease on PET with small lymph nodes, discussed at tumor board and concern of level of local invasion, arrange rectal MRI, discussed neoadjuvant treatment options with patient and wife and desired to treat aggressively for maximal chance of CR, arrange JENNIFER in the manner of Prodige 23 (Neoadjuvant rectal cancer treatment with JENNIFER using three months of modified FOLFIRINOX (oxaliplatin 85 mg/m2, leucovorin 400 mg/m2, irinotecan 180 mg/m2 day 1, and FU 2400 mg/m2 over 46 hours every 14 days) followed by long-course CRT (50 Gy in 25 fractions plus concurrent capecitabine), TME, and three additional months of FOLFOX or capecitabine), we discussed toxicities and management, arrange port placement, antiemetics, Emla, IV iron, rectal MRI confirmed chief receive M0 disease, proceed to cycle 1 neoadjuvant FOLFIRINOX 8/11/2022, generally tolerated well, 4 bowel movements a day but not watery, there was left lower quadrant pain, nausea, discussed management accordingly, labs reviewed and CEA down, proceed to cycle 2, address all the nutrition questions, return in 2 weeks but call as needed. 9/7/2022: She returns today with  for follow up and C3 FOLFIRINOX. Nausea is controlled with zofran and chinese candied plums. Denies any diarrhea and reports hard stools, continues to have rectal bleeding with BM-stable. Very mild mucositis. The week of treatment there is fatigue and decreased appetite.   These improve in the 2nd week, weight is stable. There is no cough or shortness of breath, reports occasional episodes of PND/phelgm in her throat. Cold sensitivity lasts 1 week, no true neuropathy. She also reports ongoing LLQ discomfort following treatment, none currently. Denies any fevers or infectious symptoms. Labs reviewed and adequate to proceed with C3 FOLFIRINOX as planned. Return in 2 weeks for follow up and C4. She was encouraged to push calories and fluids. All questions are answered to their satisfaction. They will call for further questions and concerns. Audio  used today: [place] [id number]    ECOG PERFORMANCE STATUS - 0-Fully active, able to carry on all pre-disease performance without restriction. Pain - 0 - No pain/10. None/Minimal pain - not affecting QOL     Fatigue - No flowsheet data found. Distress - No flowsheet data found.               Segundo Dutta) [address] [place] [address] Hematology and Oncology  [address] [place] [address]  Office : [phone]  Fax : [phone]

## 2022-09-08 ENCOUNTER — HOSPITAL ENCOUNTER (OUTPATIENT)
Dept: INFUSION THERAPY | Age: 47
Discharge: HOME | End: 2022-09-08
Payer: COMMERCIAL

## 2022-09-08 VITALS
BODY MASS INDEX: 20.44 KG/M2 | OXYGEN SATURATION: 98 % | RESPIRATION RATE: 18 BRPM | DIASTOLIC BLOOD PRESSURE: 71 MMHG | HEART RATE: 72 BPM | WEIGHT: 108.2 LBS | SYSTOLIC BLOOD PRESSURE: 106 MMHG | TEMPERATURE: 98.5 F

## 2022-09-08 DIAGNOSIS — C20 RECTAL CANCER (HCC): Primary | ICD-10-CM

## 2022-09-08 PROCEDURE — 6360000002 HC RX W HCPCS: Performed by: INTERNAL MEDICINE

## 2022-09-08 PROCEDURE — 96417 CHEMO IV INFUS EACH ADDL SEQ: CPT

## 2022-09-08 PROCEDURE — 2580000003 HC RX 258: Performed by: INTERNAL MEDICINE

## 2022-09-08 PROCEDURE — 96367 TX/PROPH/DG ADDL SEQ IV INF: CPT

## 2022-09-08 PROCEDURE — 96372 THER/PROPH/DIAG INJ SC/IM: CPT

## 2022-09-08 PROCEDURE — 96375 TX/PRO/DX INJ NEW DRUG ADDON: CPT

## 2022-09-08 PROCEDURE — G0498 CHEMO EXTEND IV INFUS W/PUMP: HCPCS

## 2022-09-08 PROCEDURE — 96413 CHEMO IV INFUSION 1 HR: CPT

## 2022-09-08 PROCEDURE — 96415 CHEMO IV INFUSION ADDL HR: CPT

## 2022-09-08 PROCEDURE — 96368 THER/DIAG CONCURRENT INF: CPT

## 2022-09-08 RX ORDER — ONDANSETRON 2 MG/ML
8 INJECTION INTRAMUSCULAR; INTRAVENOUS ONCE
Status: COMPLETED | OUTPATIENT
Start: 2022-09-08 | End: 2022-09-08

## 2022-09-08 RX ORDER — DEXTROSE MONOHYDRATE 50 MG/ML
5-250 INJECTION, SOLUTION INTRAVENOUS PRN
Status: DISCONTINUED | OUTPATIENT
Start: 2022-09-08 | End: 2022-09-09 | Stop reason: HOSPADM

## 2022-09-08 RX ORDER — ATROPINE SULFATE 0.4 MG/ML
0.4 AMPUL (ML) INJECTION ONCE
Status: COMPLETED | OUTPATIENT
Start: 2022-09-08 | End: 2022-09-08

## 2022-09-08 RX ORDER — SODIUM CHLORIDE 0.9 % (FLUSH) 0.9 %
5-40 SYRINGE (ML) INJECTION PRN
Status: DISCONTINUED | OUTPATIENT
Start: 2022-09-08 | End: 2022-09-09 | Stop reason: HOSPADM

## 2022-09-08 RX ADMIN — ONDANSETRON 8 MG: 2 INJECTION INTRAMUSCULAR; INTRAVENOUS at 08:49

## 2022-09-08 RX ADMIN — DEXAMETHASONE SODIUM PHOSPHATE 12 MG: 4 INJECTION, SOLUTION INTRAMUSCULAR; INTRAVENOUS at 08:57

## 2022-09-08 RX ADMIN — OXALIPLATIN 120 MG: 5 INJECTION, SOLUTION INTRAVENOUS at 09:50

## 2022-09-08 RX ADMIN — ATROPINE SULFATE 0.4 MG: 0.4 INJECTION, SOLUTION INTRAMUSCULAR; INTRAVENOUS; SUBCUTANEOUS at 11:52

## 2022-09-08 RX ADMIN — FLUOROURACIL 3425 MG: 50 INJECTION, SOLUTION INTRAVENOUS at 13:30

## 2022-09-08 RX ADMIN — DEXTROSE MONOHYDRATE 25 ML/HR: 50 INJECTION, SOLUTION INTRAVENOUS at 08:49

## 2022-09-08 RX ADMIN — IRINOTECAN HYDROCHLORIDE 260 MG: 20 INJECTION, SOLUTION INTRAVENOUS at 11:56

## 2022-09-08 RX ADMIN — LEUCOVORIN CALCIUM 572 MG: 200 INJECTION, POWDER, LYOPHILIZED, FOR SOLUTION INTRAMUSCULAR; INTRAVENOUS at 11:54

## 2022-09-08 RX ADMIN — FOSAPREPITANT 150 MG: 150 INJECTION, POWDER, LYOPHILIZED, FOR SOLUTION INTRAVENOUS at 09:17

## 2022-09-08 RX ADMIN — SODIUM CHLORIDE, PRESERVATIVE FREE 10 ML: 5 INJECTION INTRAVENOUS at 08:48

## 2022-09-08 NOTE — PROGRESS NOTES
Patient arrived ambulatory to infusion center. C3D1 FOLFIRINOX completed. Patient tolerated well. Discharged ambulatory with elastomeric pump. Tubing unclamped. Patient aware of next infusion appt on 9/10 at 4:30 for pump d/c.

## 2022-09-10 ENCOUNTER — HOSPITAL ENCOUNTER (OUTPATIENT)
Dept: INFUSION THERAPY | Age: 47
Discharge: HOME | End: 2022-09-10
Payer: COMMERCIAL

## 2022-09-10 VITALS
SYSTOLIC BLOOD PRESSURE: 108 MMHG | HEART RATE: 79 BPM | RESPIRATION RATE: 18 BRPM | DIASTOLIC BLOOD PRESSURE: 72 MMHG | OXYGEN SATURATION: 97 % | TEMPERATURE: 98.1 F

## 2022-09-10 DIAGNOSIS — C20 RECTAL CANCER (HCC): Primary | ICD-10-CM

## 2022-09-10 PROCEDURE — 2580000003 HC RX 258: Performed by: INTERNAL MEDICINE

## 2022-09-10 PROCEDURE — 96523 IRRIG DRUG DELIVERY DEVICE: CPT

## 2022-09-10 RX ORDER — SODIUM CHLORIDE 0.9 % (FLUSH) 0.9 %
5-40 SYRINGE (ML) INJECTION PRN
Status: DISCONTINUED | OUTPATIENT
Start: 2022-09-10 | End: 2022-09-11 | Stop reason: HOSPADM

## 2022-09-10 RX ADMIN — Medication 10 ML: at 12:17

## 2022-09-10 NOTE — PROGRESS NOTES
Arrived to the [facility]. Continuous chemo completed; elastomeric pump removed. Patient tolerated well. Any issues or concerns during appointment: none. Patient aware of next infusion appointment on 9/21/22. Patient instructed to call provider with temperature of 100.4 or greater or nausea/vomiting/ diarrhea or pain not controlled by medications. Discharged ambulatory.

## 2022-09-21 ENCOUNTER — HOSPITAL ENCOUNTER (OUTPATIENT)
Dept: LAB | Age: 47
Discharge: HOME | End: 2022-09-24
Payer: COMMERCIAL

## 2022-09-21 ENCOUNTER — OFFICE VISIT (OUTPATIENT)
Dept: ONCOLOGY | Age: 47
End: 2022-09-21
Payer: COMMERCIAL

## 2022-09-21 VITALS
DIASTOLIC BLOOD PRESSURE: 79 MMHG | RESPIRATION RATE: 16 BRPM | WEIGHT: 110.8 LBS | HEART RATE: 84 BPM | SYSTOLIC BLOOD PRESSURE: 110 MMHG | TEMPERATURE: 98 F | BODY MASS INDEX: 20.92 KG/M2 | OXYGEN SATURATION: 98 % | HEIGHT: 61 IN

## 2022-09-21 DIAGNOSIS — C20 RECTAL CANCER (HCC): Primary | ICD-10-CM

## 2022-09-21 DIAGNOSIS — R11.0 CHEMOTHERAPY-INDUCED NAUSEA: ICD-10-CM

## 2022-09-21 DIAGNOSIS — R53.83 FATIGUE DUE TO TREATMENT: ICD-10-CM

## 2022-09-21 DIAGNOSIS — C20 RECTAL CANCER (HCC): ICD-10-CM

## 2022-09-21 DIAGNOSIS — T45.1X5A CHEMOTHERAPY-INDUCED NAUSEA: ICD-10-CM

## 2022-09-21 DIAGNOSIS — Z79.899 HIGH RISK MEDICATION USE: ICD-10-CM

## 2022-09-21 LAB
ALBUMIN SERPL-MCNC: 3.4 G/DL (ref 3.5–5)
ALBUMIN/GLOB SERPL: 1 {RATIO} (ref 1.2–3.5)
ALP SERPL-CCNC: 67 U/L (ref 50–136)
ALT SERPL-CCNC: 34 U/L (ref 12–65)
ANION GAP SERPL CALC-SCNC: 5 MMOL/L (ref 4–13)
AST SERPL-CCNC: 15 U/L (ref 15–37)
BASOPHILS # BLD: 0 K/UL (ref 0–0.2)
BASOPHILS NFR BLD: 1 % (ref 0–2)
BILIRUB SERPL-MCNC: 0.2 MG/DL (ref 0.2–1.1)
BUN SERPL-MCNC: 15 MG/DL (ref 6–23)
CALCIUM SERPL-MCNC: 8.8 MG/DL (ref 8.3–10.4)
CEA SERPL-MCNC: 2.2 NG/ML (ref 0–3)
CHLORIDE SERPL-SCNC: 107 MMOL/L (ref 101–110)
CO2 SERPL-SCNC: 27 MMOL/L (ref 21–32)
CREAT SERPL-MCNC: 0.5 MG/DL (ref 0.6–1)
DIFFERENTIAL METHOD BLD: ABNORMAL
EOSINOPHIL # BLD: 0.1 K/UL (ref 0–0.8)
EOSINOPHIL NFR BLD: 3 % (ref 0.5–7.8)
ERYTHROCYTE [DISTWIDTH] IN BLOOD BY AUTOMATED COUNT: 17.6 % (ref 11.9–14.6)
GLOBULIN SER CALC-MCNC: 3.5 G/DL (ref 2.3–3.5)
GLUCOSE SERPL-MCNC: 120 MG/DL (ref 65–100)
HBV SURFACE AB SERPL IA-ACNC: 6.07 MIU/ML
HBV SURFACE AG SER QL: NONREACTIVE
HCT VFR BLD AUTO: 39.2 % (ref 35.8–46.3)
HCV AB SER QL: NONREACTIVE
HGB BLD-MCNC: 12.9 G/DL (ref 11.7–15.4)
IMM GRANULOCYTES # BLD AUTO: 0 K/UL (ref 0–0.5)
IMM GRANULOCYTES NFR BLD AUTO: 1 % (ref 0–5)
LYMPHOCYTES # BLD: 1 K/UL (ref 0.5–4.6)
LYMPHOCYTES NFR BLD: 28 % (ref 13–44)
MAGNESIUM SERPL-MCNC: 2.3 MG/DL (ref 1.8–2.4)
MCH RBC QN AUTO: 30.2 PG (ref 26.1–32.9)
MCHC RBC AUTO-ENTMCNC: 32.9 G/DL (ref 31.4–35)
MCV RBC AUTO: 91.8 FL (ref 79.6–97.8)
MONOCYTES # BLD: 0.5 K/UL (ref 0.1–1.3)
MONOCYTES NFR BLD: 14 % (ref 4–12)
NEUTS SEG # BLD: 2 K/UL (ref 1.7–8.2)
NEUTS SEG NFR BLD: 54 % (ref 43–78)
NRBC # BLD: 0 K/UL (ref 0–0.2)
PLATELET # BLD AUTO: 138 K/UL (ref 150–450)
PMV BLD AUTO: 11.9 FL (ref 9.4–12.3)
POTASSIUM SERPL-SCNC: 3.6 MMOL/L (ref 3.5–5.1)
PROT SERPL-MCNC: 6.9 G/DL (ref 6.3–8.2)
RBC # BLD AUTO: 4.27 M/UL (ref 4.05–5.2)
SODIUM SERPL-SCNC: 139 MMOL/L (ref 136–145)
WBC # BLD AUTO: 3.7 K/UL (ref 4.3–11.1)

## 2022-09-21 PROCEDURE — 83735 ASSAY OF MAGNESIUM: CPT

## 2022-09-21 PROCEDURE — 86704 HEP B CORE ANTIBODY TOTAL: CPT

## 2022-09-21 PROCEDURE — 36415 COLL VENOUS BLD VENIPUNCTURE: CPT

## 2022-09-21 PROCEDURE — 80053 COMPREHEN METABOLIC PANEL: CPT

## 2022-09-21 PROCEDURE — 82378 CARCINOEMBRYONIC ANTIGEN: CPT

## 2022-09-21 PROCEDURE — 86706 HEP B SURFACE ANTIBODY: CPT

## 2022-09-21 PROCEDURE — 85025 COMPLETE CBC W/AUTO DIFF WBC: CPT

## 2022-09-21 PROCEDURE — 86803 HEPATITIS C AB TEST: CPT

## 2022-09-21 PROCEDURE — 87340 HEPATITIS B SURFACE AG IA: CPT

## 2022-09-21 PROCEDURE — 99215 OFFICE O/P EST HI 40 MIN: CPT | Performed by: INTERNAL MEDICINE

## 2022-09-21 RX ORDER — ONDANSETRON 4 MG/1
4 TABLET, FILM COATED ORAL EVERY 8 HOURS PRN
Qty: 90 TABLET | Refills: 2 | Status: SHIPPED | OUTPATIENT
Start: 2022-09-21

## 2022-09-21 RX ORDER — PROCHLORPERAZINE MALEATE 10 MG
10 TABLET ORAL EVERY 6 HOURS PRN
Qty: 120 TABLET | Refills: 2 | Status: SHIPPED | OUTPATIENT
Start: 2022-09-21

## 2022-09-21 ASSESSMENT — ANXIETY QUESTIONNAIRES
6. BECOMING EASILY ANNOYED OR IRRITABLE: 0
3. WORRYING TOO MUCH ABOUT DIFFERENT THINGS: 0
7. FEELING AFRAID AS IF SOMETHING AWFUL MIGHT HAPPEN: 0
1. FEELING NERVOUS, ANXIOUS, OR ON EDGE: 0
IF YOU CHECKED OFF ANY PROBLEMS ON THIS QUESTIONNAIRE, HOW DIFFICULT HAVE THESE PROBLEMS MADE IT FOR YOU TO DO YOUR WORK, TAKE CARE OF THINGS AT HOME, OR GET ALONG WITH OTHER PEOPLE: NOT DIFFICULT AT ALL
5. BEING SO RESTLESS THAT IT IS HARD TO SIT STILL: 0
4. TROUBLE RELAXING: 0
2. NOT BEING ABLE TO STOP OR CONTROL WORRYING: 0
GAD7 TOTAL SCORE: 0

## 2022-09-21 ASSESSMENT — PATIENT HEALTH QUESTIONNAIRE - PHQ9
9. THOUGHTS THAT YOU WOULD BE BETTER OFF DEAD, OR OF HURTING YOURSELF: 0
5. POOR APPETITE OR OVEREATING: 1
3. TROUBLE FALLING OR STAYING ASLEEP: 1
SUM OF ALL RESPONSES TO PHQ QUESTIONS 1-9: 3
2. FEELING DOWN, DEPRESSED OR HOPELESS: 0
10. IF YOU CHECKED OFF ANY PROBLEMS, HOW DIFFICULT HAVE THESE PROBLEMS MADE IT FOR YOU TO DO YOUR WORK, TAKE CARE OF THINGS AT HOME, OR GET ALONG WITH OTHER PEOPLE: 0
6. FEELING BAD ABOUT YOURSELF - OR THAT YOU ARE A FAILURE OR HAVE LET YOURSELF OR YOUR FAMILY DOWN: 0
7. TROUBLE CONCENTRATING ON THINGS, SUCH AS READING THE NEWSPAPER OR WATCHING TELEVISION: 0
8. MOVING OR SPEAKING SO SLOWLY THAT OTHER PEOPLE COULD HAVE NOTICED. OR THE OPPOSITE, BEING SO FIGETY OR RESTLESS THAT YOU HAVE BEEN MOVING AROUND A LOT MORE THAN USUAL: 0
SUM OF ALL RESPONSES TO PHQ QUESTIONS 1-9: 3
4. FEELING TIRED OR HAVING LITTLE ENERGY: 1

## 2022-09-21 NOTE — PROGRESS NOTES
Care for your left foot  1  You may weightbear as tolerated in the surgical shoe  You do not need to use the crutches  2   Keep the dressing on her foot intact until Friday  At that time he may remove the dressing and shower normally  Take care not to put all of her weight on the front of her foot while showering  Following cleaning her foot you can cover the incisions with some light gauze or bandage  3    If you notice swelling in her feet continue the Ace bandage  If swelling is minimal you do not need the Ace bandage  4    You do not need to put any ointment Neosporin or other material over the incisions  Just a clean dry bandage  5    Your next appointment is next week where I will remove the stitches  [facility]: Office Visit Progress Note    Chief Complaint:    Rectal cancer    History of Present Illness:  Reason for Referral: Rectal cancer     Referring Provider:  Vianca Ji MD     Primary Care Provider: Zoila Vazquez DO     Family History of Cancer/Hematologic Disorders: None noted     Presenting Symptoms: LLQ abdominal pain     Ms. Esther Whitaker is a 51 y.o.  female who reports to have never used tobacco products. She denies use of alcohol or drug substances. Her medical history reports as rectal bleeding. Her surgical history reports as  and colonoscopy. On 7/15/22, Ms. Esther Whitaker saw Dr Corby Russell for her newly diagnosed rectal cancer. She reported to have had rectal bleeding for 2 months which prompted her first colonoscopy. A 5 cm half circumferential friable mass was identified at the most proximal valve of Singh, biopsy revealed at least carcinoma in situ. 2022 CT scan of c/a/p demonstrated no distal metastases. She reported intermittent left lower quadrant abdominal pain for years. She has EUS scheduled for August however Dr Corby Russell reached out to Dr Eric Wheeler to move up her planned EUS. A referral was placed to hematology for consideration of neoadjuvant chemoradiation. Currently there is no referral to radiation oncology. Her 2022 CBC and CMP reported WNL except for a low creatinine of 0.47.     22 Colonoscopy         2022 Surgical Pathology  DIAGNOSIS  A:  \"RECTAL MASS BIOPSIES\":        FRAGMENTS OF AT LEAST IN SITU ADENOCARCINOMA. SEE COMMENT. COMMENT: BECAUSE BIOPSY FRAGMENTS ARE LIMITED PRIMARIALY TO MUCOSA,   SUBMUCOSAL INFILTRATION CANNOT BE RULED OUT   B:  \"RANDOM COLON BIOPSIES\":        FRAGMENTS OF HISTOLOGICALLY UNREMARKABLE LARGE INTESTINE   C:  \"ASCENDING COLON POLYP\":        FRAGMENT OF MIXED TUBULOVILLOUS ADENOMA     22 CT Chest Abdomen and Pelvis  FINDINGS:  Lungs: No pulmonary nodules. No airspace disease.   Airways: Trachea and proximal bronchi grossly patent. Pleura: No effusion or thickening or calcifications. Lymph Nodes: No enlarged axillary, hilar or mediastinal lymph nodes. Heart: Normal size. Coronaries: No definite calcifications. Aorta: Normal caliber. Esophagus: Thickening of the distal esophagus  Skeletal/Chest Wall: No gross bony lesions. Liver: A 4 mm cyst anterior segment right lobe, axial image 47, otherwise  unremarkable. Gallbladder: No gallstones identified  Bile Ducts: Not dilated. Pancreas: Unremarkable. Spleen: Uniform and normal size. Stomach: Unremarkable. Bowel: There is mural thickening of the rectum, probably a cyst 6 cm in length. Margins are indistinct. Small nodular densities probably represent mesorectal  node. Adrenals: Are normal size. Kidneys/Ureters: Enhance symmetrically. No hydronephrosis. Lymph Nodes: No grossly enlarged retroperitoneal, mesenteric, or pelvic  adenopathy. Vasculature: Aorta is normal caliber. Bones: No gross bony lesions. Other: No ascites. Impression  Mural thickening of the rectum suspicious for neoplasm. Margins are  indistinct. Probable small mesorectal lymph nodes. There is one small cyst in  liver. No pulmonary nodules. PET [id number]:  1. Hypermetabolic rectal mass consistent with known rectal malignancy. No   evidence of metastatic disease in the neck, chest, abdomen or pelvis. Small   perirectal lymph nodes are present but may be too small to accurately   characterize by PET imaging. No FDG activity noted in these nodes. Review of Systems:  Constitutional Fatigue. Denies fever or chills. Denies weight loss or appetite changes. Denies anorexia. HEENT Denies trauma, bluring vision, hearing loss, ear pain, nosebleeds, sore throat, neck pain and ear discharge. Skin Denies lesions or rashes. Lungs Denies shortness of breath, cough, sputum production or hemoptysis. Cardiovascular Denies chest pain, palpitations, orthopnea, claudication and leg swelling. Gastrointestinal Blood in stool. Nausea. Denies vomiting. Denies abdominal pain.  Denies dysuria, frequency or hesitancy of urination   Neuro Denies headaches, visual changes or ataxia. Denies dizziness, tingling, tremors, sensory change, speech change, focal weakness and headaches. Hematology Denies nasal/gum bleeding, denies easy bruise   Endo Denies heat/cold intolerance, denies diabetes. MSK Denies back pain, swollen legs, myalgias and falls. Psychiatric/Behavioral Denies depression and substance abuse. The patient is not nervous/anxious.        Allergies   Allergen Reactions    Amoxicillin Rash     Past Medical History:   Diagnosis Date    Cancer ([facility] [address]     rectal cancer-- dx 2022---- followed by dr Mary Kate Live-- per spouse after scans/test  then will determine what the plan is    Rectal bleeding     onset 2022     Past Surgical History:   Procedure Laterality Date     SECTION      COLONOSCOPY      COLONOSCOPY N/A 2022    COLONOSCOPY ULTRASOUND/ BMI 19 performed by Edwin Gann MD at [facility] ENDOSCOPY    IR PORT PLACEMENT EQUAL OR GREATER THAN 5 YEARS  2022    IR PORT PLACEMENT EQUAL OR GREATER THAN 5 YEARS 2022 [facility] SPECIALS     Family History   Problem Relation Age of Onset    High Blood Pressure Mother      Social History     Socioeconomic History    Marital status:      Spouse name: Not on file    Number of children: Not on file    Years of education: Not on file    Highest education level: Not on file   Occupational History    Not on file   Tobacco Use    Smoking status: Never    Smokeless tobacco: Never   Vaping Use    Vaping Use: Never used   Substance and Sexual Activity    Alcohol use: Yes     Comment: rare    Drug use: Never    Sexual activity: Not on file   Other Topics Concern    Not on file   Social History Narrative    Not on file     Social Determinants of Health     Financial Resource Strain: Not on file   Food Insecurity: Not on file Transportation Needs: Not on file   Physical Activity: Not on file   Stress: Not on file   Social Connections: Not on file   Intimate Partner Violence: Not on file   Housing Stability: Not on file     Current Outpatient Medications   Medication Sig Dispense Refill    prochlorperazine (COMPAZINE) 10 MG tablet Take 1 tablet by mouth every 6 hours as needed (nausea or vomiting) 120 tablet 2    ondansetron (ZOFRAN) 4 MG tablet Take 1 tablet by mouth every 8 hours as needed for Nausea or Vomiting 90 tablet 2    lidocaine-prilocaine (EMLA) 2.5-2.5 % cream Apply topically as needed. Place small amount to port site 45 minutes prior to blood draws and/or infusion. Cover with plastic wrap 30 g 2     No current facility-administered medications for this visit. OBJECTIVE:  /79   Pulse 84   Temp 98 °F (36.7 °C)   Resp 16   Ht 5' 1\" (1.549 m)   Wt 110 lb 12.8 oz (50.3 kg)   SpO2 98%   BMI 20.94 kg/m²     Physical Exam:  Constitutional: Oriented to person, place, and time. Well-developed and well-nourished. HEENT: Normocephalic and atraumatic. Oropharynx is clear and moist.   Conjunctivae and EOM are normal. Pupils are equal, round, and reactive to light. No scleral icterus. Neck supple. No JVD present. No tracheal deviation present. No thyromegaly present. Lymph node No palpable submandibular, cervical, supraclavicular, axillary and inguinal lymph nodes. Skin Warm and dry. No bruising and no rash noted. No erythema. No pallor. Respiratory Effort normal and breath sounds normal.  No respiratory distress. No wheezes. No rales. No tenderness. CVS Normal rate, regular rhythm and normal heart sounds. Exam reveals no gallop, no friction and no rub. No murmur heard. Abdomen Soft. Bowel sounds are normal. Exhibits no distension. There is no tenderness. There is no rebound and no guarding. Neuro Normal reflexes. No cranial nerve deficit.   Exhibits normal muscle tone, 5 of 5 strength of all extremities. MSK Normal range of motion in general.  No edema and no tenderness.    Psych Normal mood, affect, behavior, judgment and thought content      Labs:  Recent Results (from the past 24 hour(s))   CEA    Collection Time: 09/21/22 11:23 AM   Result Value Ref Range    CEA 2.2 0.0 - 3.0 ng/mL   CBC with Auto Differential    Collection Time: 09/21/22 11:23 AM   Result Value Ref Range    WBC 3.7 (L) 4.3 - 11.1 K/uL    RBC 4.27 4.05 - 5.2 M/uL    Hemoglobin 12.9 11.7 - 15.4 g/dL    Hematocrit 39.2 35.8 - 46.3 %    MCV 91.8 79.6 - 97.8 FL    MCH 30.2 26.1 - 32.9 PG    MCHC 32.9 31.4 - 35.0 g/dL    RDW 17.6 (H) 11.9 - 14.6 %    Platelets 407 (L) 369 - 450 K/uL    MPV 11.9 9.4 - 12.3 FL    nRBC 0.00 0.0 - 0.2 K/uL    Differential Type AUTOMATED      Seg Neutrophils 54 43 - 78 %    Lymphocytes 28 13 - 44 %    Monocytes 14 (H) 4.0 - 12.0 %    Eosinophils % 3 0.5 - 7.8 %    Basophils 1 0.0 - 2.0 %    Immature Granulocytes 1 0.0 - 5.0 %    Segs Absolute 2.0 1.7 - 8.2 K/UL    Absolute Lymph # 1.0 0.5 - 4.6 K/UL    Absolute Mono # 0.5 0.1 - 1.3 K/UL    Absolute Eos # 0.1 0.0 - 0.8 K/UL    Basophils Absolute 0.0 0.0 - 0.2 K/UL    Absolute Immature Granulocyte 0.0 0.0 - 0.5 K/UL   Comprehensive Metabolic Panel    Collection Time: 09/21/22 11:23 AM   Result Value Ref Range    Sodium 139 136 - 145 mmol/L    Potassium 3.6 3.5 - 5.1 mmol/L    Chloride 107 101 - 110 mmol/L    CO2 27 21 - 32 mmol/L    Anion Gap 5 4 - 13 mmol/L    Glucose 120 (H) 65 - 100 mg/dL    BUN 15 6 - 23 MG/DL    Creatinine 0.50 (L) 0.6 - 1.0 MG/DL    GFR African American >60 >60 ml/min/1.73m2    GFR Non- >60 >60 ml/min/1.73m2    Calcium 8.8 8.3 - 10.4 MG/DL    Total Bilirubin 0.2 0.2 - 1.1 MG/DL    ALT 34 12 - 65 U/L    AST 15 15 - 37 U/L    Alk Phosphatase 67 50 - 136 U/L    Total Protein 6.9 6.3 - 8.2 g/dL    Albumin 3.4 (L) 3.5 - 5.0 g/dL    Globulin 3.5 2.3 - 3.5 g/dL    Albumin/Globulin Ratio 1.0 (L) 1.2 - 3.5     Magnesium Collection Time: 09/21/22 11:23 AM   Result Value Ref Range    Magnesium 2.3 1.8 - 2.4 mg/dL       Imaging:  No results found for this or any previous visit. ASSESSMENT/PLAN:   Diagnosis Orders   1. Rectal cancer ([address]        2. Chemotherapy-induced nausea        3. Fatigue due to treatment        4. High risk medication use          51 y.o. F urgently consulted for rectal carcinoma.   She had good general baseline health, developed frequency of bowel movement and blood in stool for 2 months, colonoscopy showed low rectal adenocarcinoma and urgently consulted to oncology, discussed carcinogenesis and staging, urgent arrangement of EUS reported T3N0 disease but quite large avid disease on PET with small lymph nodes, discussed at tumor board and concern of level of local invasion, arrange rectal MRI, discussed neoadjuvant treatment options with patient and wife and desired to treat aggressively for maximal chance of CR, arrange JENNIFER in the manner of Prodige 23 (Neoadjuvant rectal cancer treatment with JENNIFER using three months of modified FOLFIRINOX (oxaliplatin 85 mg/m2, leucovorin 400 mg/m2, irinotecan 180 mg/m2 day 1, and FU 2400 mg/m2 over 46 hours every 14 days) followed by long-course CRT (50 Gy in 25 fractions plus concurrent capecitabine), TME, and three additional months of FOLFOX or capecitabine), we discussed toxicities and management, arrange port placement, antiemetics, Emla, IV iron, rectal MRI confirmed chief receive M0 disease, proceed to cycle 1 neoadjuvant FOLFIRINOX 8/11/2022, generally tolerated well, 4 bowel movements a day but not watery, there was left lower quadrant pain, nausea, discussed management accordingly, labs reviewed and CEA down, signs of clinical response but complaining of increased fatigue, discussed weight is stable and labs are reviewed and agreed to proceed to cycle 4 without dose reduction, refill Compazine and Zofran for nausea control, return in 2 weeks for cycle 5 but call as needed. All questions are answered to their satisfaction. They will call for further questions and concerns. ECOG PERFORMANCE STATUS - 0-Fully active, able to carry on all pre-disease performance without restriction. Pain - 0 - No pain/10. None/Minimal pain - not affecting QOL     Fatigue - No flowsheet data found. Distress - No flowsheet data found. Elements of this note have been dictated via voice recognition software. Text and phrases may be limited by the accuracy and autoconversion of the software. The chart has been reviewed, but errors may still be present. Robe Sol M.D.   [address]  Office : [phone]  Fax : [phone]

## 2022-09-21 NOTE — PATIENT INSTRUCTIONS
Patient Instructions from Today's Visit    Reason for Visit:  Prechemo visit    Diagnosis Information:  [email or web address] [email or web address]-us/[email or web address]-[email or web address]-[email or web address]-[email or web address]-[email or web address]-[email or web address]-[email or web address]    Plan:  -Cycle 4 Folfirinox today    Follow Up:  As scheduled    Recent Lab Results:  Hospital Outpatient Visit on 09/21/2022   Component Date Value Ref Range Status    WBC 09/21/2022 3.7 (A) 4.3 - 11.1 K/uL Final    RBC 09/21/2022 4.27  4.05 - 5.2 M/uL Final    Hemoglobin 09/21/2022 12.9  11.7 - 15.4 g/dL Final    Hematocrit 09/21/2022 39.2  35.8 - 46.3 % Final    MCV 09/21/2022 91.8  79.6 - 97.8 FL Final    MCH 09/21/2022 30.2  26.1 - 32.9 PG Final    MCHC 09/21/2022 32.9  31.4 - 35.0 g/dL Final    RDW 09/21/2022 17.6 (A) 11.9 - 14.6 % Final    Platelets 65/23/3188 138 (A) 150 - 450 K/uL Final    MPV 09/21/2022 11.9  9.4 - 12.3 FL Final    nRBC 09/21/2022 0.00  0.0 - 0.2 K/uL Final    **Note: Absolute NRBC parameter is now reported with Hemogram**    Differential Type 09/21/2022 AUTOMATED    Final    Seg Neutrophils 09/21/2022 54  43 - 78 % Final    Lymphocytes 09/21/2022 28  13 - 44 % Final    Monocytes 09/21/2022 14 (A) 4.0 - 12.0 % Final    Eosinophils % 09/21/2022 3  0.5 - 7.8 % Final    Basophils 09/21/2022 1  0.0 - 2.0 % Final    Immature Granulocytes 09/21/2022 1  0.0 - 5.0 % Final    Segs Absolute 09/21/2022 2.0  1.7 - 8.2 K/UL Final    Absolute Lymph # 09/21/2022 1.0  0.5 - 4.6 K/UL Final    Absolute Mono # 09/21/2022 0.5  0.1 - 1.3 K/UL Final    Absolute Eos # 09/21/2022 0.1  0.0 - 0.8 K/UL Final    Basophils Absolute 09/21/2022 0.0  0.0 - 0.2 K/UL Final    Absolute Immature Granulocyte 09/21/2022 0.0  0.0 - 0.5 K/UL Final        Treatment Summary has been discussed and given to patient: n/a        -------------------------------------------------------------------------------------------------------------------  Please call our office at [phone] if you have any  of the following symptoms: Fever of 100.5 or greater  Chills  Shortness of breath  Swelling or pain in one leg    After office hours an answering service is available and will contact a provider for emergencies or if you are experiencing any of the above symptoms. Patient does express an interest in My Chart. My Chart log in information explained on the after visit summary printout at the [facility] [address] desk.     Ander Edwards RN  Nurse Navigator  [address] [place] [id number] [phone]

## 2022-09-22 ENCOUNTER — HOSPITAL ENCOUNTER (OUTPATIENT)
Dept: INFUSION THERAPY | Age: 47
Discharge: HOME | End: 2022-09-22
Payer: COMMERCIAL

## 2022-09-22 ENCOUNTER — CLINICAL DOCUMENTATION (OUTPATIENT)
Dept: ONCOLOGY | Age: 47
End: 2022-09-22

## 2022-09-22 VITALS
HEART RATE: 87 BPM | RESPIRATION RATE: 18 BRPM | TEMPERATURE: 98.6 F | SYSTOLIC BLOOD PRESSURE: 111 MMHG | WEIGHT: 111.4 LBS | OXYGEN SATURATION: 97 % | BODY MASS INDEX: 21.05 KG/M2 | DIASTOLIC BLOOD PRESSURE: 64 MMHG

## 2022-09-22 DIAGNOSIS — C20 RECTAL CANCER (HCC): Primary | ICD-10-CM

## 2022-09-22 LAB — HBV CORE AB SERPL QL IA: NEGATIVE

## 2022-09-22 PROCEDURE — 96375 TX/PRO/DX INJ NEW DRUG ADDON: CPT

## 2022-09-22 PROCEDURE — 96368 THER/DIAG CONCURRENT INF: CPT

## 2022-09-22 PROCEDURE — 96417 CHEMO IV INFUS EACH ADDL SEQ: CPT

## 2022-09-22 PROCEDURE — 2580000003 HC RX 258: Performed by: INTERNAL MEDICINE

## 2022-09-22 PROCEDURE — 96372 THER/PROPH/DIAG INJ SC/IM: CPT

## 2022-09-22 PROCEDURE — 6360000002 HC RX W HCPCS: Performed by: INTERNAL MEDICINE

## 2022-09-22 PROCEDURE — 96413 CHEMO IV INFUSION 1 HR: CPT

## 2022-09-22 PROCEDURE — G0498 CHEMO EXTEND IV INFUS W/PUMP: HCPCS

## 2022-09-22 PROCEDURE — 96415 CHEMO IV INFUSION ADDL HR: CPT

## 2022-09-22 PROCEDURE — 96367 TX/PROPH/DG ADDL SEQ IV INF: CPT

## 2022-09-22 RX ORDER — ONDANSETRON 2 MG/ML
8 INJECTION INTRAMUSCULAR; INTRAVENOUS ONCE
Status: COMPLETED | OUTPATIENT
Start: 2022-09-22 | End: 2022-09-22

## 2022-09-22 RX ORDER — ATROPINE SULFATE 0.4 MG/ML
0.4 INJECTION, SOLUTION INTRAVENOUS ONCE
Status: COMPLETED | OUTPATIENT
Start: 2022-09-22 | End: 2022-09-22

## 2022-09-22 RX ORDER — SODIUM CHLORIDE 0.9 % (FLUSH) 0.9 %
5-40 SYRINGE (ML) INJECTION PRN
Status: DISCONTINUED | OUTPATIENT
Start: 2022-09-22 | End: 2022-09-23 | Stop reason: HOSPADM

## 2022-09-22 RX ORDER — DEXTROSE MONOHYDRATE 50 MG/ML
5-250 INJECTION, SOLUTION INTRAVENOUS PRN
Status: DISCONTINUED | OUTPATIENT
Start: 2022-09-22 | End: 2022-09-23 | Stop reason: HOSPADM

## 2022-09-22 RX ORDER — ATROPINE SULFATE 0.4 MG/ML
0.4 INJECTION, SOLUTION INTRAVENOUS
Status: DISCONTINUED | OUTPATIENT
Start: 2022-09-22 | End: 2022-09-23 | Stop reason: HOSPADM

## 2022-09-22 RX ADMIN — ONDANSETRON 8 MG: 2 INJECTION INTRAMUSCULAR; INTRAVENOUS at 09:30

## 2022-09-22 RX ADMIN — SODIUM CHLORIDE, PRESERVATIVE FREE 10 ML: 5 INJECTION INTRAVENOUS at 08:40

## 2022-09-22 RX ADMIN — FLUOROURACIL 3425 MG: 50 INJECTION, SOLUTION INTRAVENOUS at 14:25

## 2022-09-22 RX ADMIN — DEXAMETHASONE SODIUM PHOSPHATE 12 MG: 4 INJECTION, SOLUTION INTRAMUSCULAR; INTRAVENOUS at 09:32

## 2022-09-22 RX ADMIN — FOSAPREPITANT 150 MG: 150 INJECTION, POWDER, LYOPHILIZED, FOR SOLUTION INTRAVENOUS at 09:53

## 2022-09-22 RX ADMIN — LEUCOVORIN CALCIUM 572 MG: 200 INJECTION, POWDER, LYOPHILIZED, FOR SOLUTION INTRAMUSCULAR; INTRAVENOUS at 12:42

## 2022-09-22 RX ADMIN — OXALIPLATIN 120 MG: 5 INJECTION, SOLUTION INTRAVENOUS at 10:25

## 2022-09-22 RX ADMIN — IRINOTECAN HYDROCHLORIDE 260 MG: 20 INJECTION, SOLUTION INTRAVENOUS at 12:42

## 2022-09-22 RX ADMIN — ATROPINE SULFATE 0.4 MG: 0.4 INJECTION, SOLUTION INTRAVENOUS at 12:26

## 2022-09-22 RX ADMIN — DEXTROSE MONOHYDRATE 50 ML/HR: 5 INJECTION, SOLUTION INTRAVENOUS at 08:45

## 2022-09-22 NOTE — PROGRESS NOTES
Clinical Social Work Note  Name: Emil Rodriguez    : 1975    MRN: [id number]    Date of Service: 2022    Type of Service: Health and Behavior Intervention     Length of Service: 16 minutes    Referral Source: Infusion RN    Reason for Visit: F/U    CM Note: Seen patient with her  during infusion, provided therapeutic reassurance. SIDNEY-CARINA discussed Self-Compassion. Gave information on upcoming support group and pottery class. Mini Mental Status Exam: Emil Rodriguez was dressed properly. No abnormal psychomotor movements observed. Intellectual functioning appeared to be intact. Insight was adequate. Judgment was adequate. Patient did not report suicidal ideations, intent or plans. Speech was coherent. Thought process was clear. Patient did not report homicidal ideations, intent or plans. Patient was oriented to self, place, time and situation. Protective Factors: Current care for physical and mental illness, adequate insight and judgment, family support, cultural and Catholic beliefs and values that support self-care. Next Steps: KAI gave contact information and encouraged pt to call should any needs arise. Pt verbalized understanding. SIDNEY -CARINA intends to follow up as needed.       Electronically Signed By:  SIDNEY Blankenship

## 2022-09-22 NOTE — PROGRESS NOTES
Arrived to the [facility]. Folfirinox in fusions and pump connect completed. Patient tolerated well. Any issues or concerns during appointment: no  Patient aware of next infusion appointment on 9/24/2022 (date) at 36 (time). Patient aware of next lab and [facility] office visit on 10/5/2022 (date) at 80 (time). Patient instructed to call provider with temperature of 100.4 or greater or nausea/vomiting/ diarrhea or pain not controlled by medications  Discharged ambulatory.
Statement Selected

## 2022-09-24 ENCOUNTER — HOSPITAL ENCOUNTER (OUTPATIENT)
Dept: INFUSION THERAPY | Age: 47
Discharge: HOME | End: 2022-09-24
Payer: COMMERCIAL

## 2022-09-24 VITALS
TEMPERATURE: 98.5 F | RESPIRATION RATE: 16 BRPM | HEART RATE: 86 BPM | SYSTOLIC BLOOD PRESSURE: 105 MMHG | DIASTOLIC BLOOD PRESSURE: 64 MMHG

## 2022-09-24 DIAGNOSIS — C20 RECTAL CANCER (HCC): ICD-10-CM

## 2022-09-24 PROCEDURE — 2580000003 HC RX 258: Performed by: INTERNAL MEDICINE

## 2022-09-24 PROCEDURE — 96523 IRRIG DRUG DELIVERY DEVICE: CPT

## 2022-09-24 RX ADMIN — Medication 10 ML: at 16:44

## 2022-09-24 NOTE — PROGRESS NOTES
Arrived to the [facility]. Adrucil pump disconnected. Patient tolerated well. Any issues or concerns during appointment: none. Patient aware of next infusion appointment on 10/6/22 (date) at 6 AM (time). Patient instructed to call provider with temperature of 100.4 or greater or nausea/vomiting/ diarrhea or pain not controlled by medications  Discharged ambulatory.

## 2022-09-30 RX ORDER — SODIUM CHLORIDE 0.9 % (FLUSH) 0.9 %
5-40 SYRINGE (ML) INJECTION PRN
Status: ACTIVE | OUTPATIENT
Start: 2022-09-30

## 2022-10-05 ENCOUNTER — OFFICE VISIT (OUTPATIENT)
Dept: ONCOLOGY | Age: 47
End: 2022-10-05
Payer: COMMERCIAL

## 2022-10-05 ENCOUNTER — CLINICAL DOCUMENTATION (OUTPATIENT)
Dept: CASE MANAGEMENT | Age: 47
End: 2022-10-05

## 2022-10-05 ENCOUNTER — HOSPITAL ENCOUNTER (OUTPATIENT)
Dept: LAB | Age: 47
Discharge: HOME | End: 2022-10-08
Payer: COMMERCIAL

## 2022-10-05 VITALS
SYSTOLIC BLOOD PRESSURE: 124 MMHG | DIASTOLIC BLOOD PRESSURE: 82 MMHG | RESPIRATION RATE: 18 BRPM | HEIGHT: 61 IN | HEART RATE: 90 BPM | OXYGEN SATURATION: 98 % | TEMPERATURE: 98 F | BODY MASS INDEX: 21.67 KG/M2 | WEIGHT: 114.8 LBS

## 2022-10-05 DIAGNOSIS — R53.83 FATIGUE DUE TO TREATMENT: ICD-10-CM

## 2022-10-05 DIAGNOSIS — C20 RECTAL CANCER (HCC): ICD-10-CM

## 2022-10-05 DIAGNOSIS — T45.1X5A CHEMOTHERAPY-INDUCED NAUSEA: ICD-10-CM

## 2022-10-05 DIAGNOSIS — G62.9 NEUROPATHY: ICD-10-CM

## 2022-10-05 DIAGNOSIS — Z79.899 HIGH RISK MEDICATION USE: ICD-10-CM

## 2022-10-05 DIAGNOSIS — C20 RECTAL CANCER (HCC): Primary | ICD-10-CM

## 2022-10-05 DIAGNOSIS — R11.0 CHEMOTHERAPY-INDUCED NAUSEA: ICD-10-CM

## 2022-10-05 DIAGNOSIS — D69.6 THROMBOCYTOPENIA (HCC): ICD-10-CM

## 2022-10-05 LAB
ALBUMIN SERPL-MCNC: 3.3 G/DL (ref 3.5–5)
ALBUMIN/GLOB SERPL: 1 {RATIO} (ref 1.2–3.5)
ALP SERPL-CCNC: 70 U/L (ref 50–136)
ALT SERPL-CCNC: 82 U/L (ref 12–65)
ANION GAP SERPL CALC-SCNC: 5 MMOL/L (ref 4–13)
AST SERPL-CCNC: 42 U/L (ref 15–37)
BASOPHILS # BLD: 0 K/UL (ref 0–0.2)
BASOPHILS NFR BLD: 0 % (ref 0–2)
BILIRUB SERPL-MCNC: 0.2 MG/DL (ref 0.2–1.1)
BUN SERPL-MCNC: 13 MG/DL (ref 6–23)
CALCIUM SERPL-MCNC: 8.7 MG/DL (ref 8.3–10.4)
CEA SERPL-MCNC: 1.7 NG/ML (ref 0–3)
CHLORIDE SERPL-SCNC: 107 MMOL/L (ref 101–110)
CO2 SERPL-SCNC: 27 MMOL/L (ref 21–32)
CREAT SERPL-MCNC: 0.6 MG/DL (ref 0.6–1)
DIFFERENTIAL METHOD BLD: ABNORMAL
EOSINOPHIL # BLD: 0.1 K/UL (ref 0–0.8)
EOSINOPHIL NFR BLD: 2 % (ref 0.5–7.8)
ERYTHROCYTE [DISTWIDTH] IN BLOOD BY AUTOMATED COUNT: 17.6 % (ref 11.9–14.6)
GLOBULIN SER CALC-MCNC: 3.4 G/DL (ref 2.3–3.5)
GLUCOSE SERPL-MCNC: 125 MG/DL (ref 65–100)
HCT VFR BLD AUTO: 38.4 % (ref 35.8–46.3)
HGB BLD-MCNC: 12.8 G/DL (ref 11.7–15.4)
IMM GRANULOCYTES # BLD AUTO: 0 K/UL (ref 0–0.5)
IMM GRANULOCYTES NFR BLD AUTO: 0 % (ref 0–5)
LYMPHOCYTES # BLD: 1.1 K/UL (ref 0.5–4.6)
LYMPHOCYTES NFR BLD: 34 % (ref 13–44)
MAGNESIUM SERPL-MCNC: 2.3 MG/DL (ref 1.8–2.4)
MCH RBC QN AUTO: 30.8 PG (ref 26.1–32.9)
MCHC RBC AUTO-ENTMCNC: 33.3 G/DL (ref 31.4–35)
MCV RBC AUTO: 92.5 FL (ref 79.6–97.8)
MONOCYTES # BLD: 0.6 K/UL (ref 0.1–1.3)
MONOCYTES NFR BLD: 18 % (ref 4–12)
NEUTS SEG # BLD: 1.5 K/UL (ref 1.7–8.2)
NEUTS SEG NFR BLD: 46 % (ref 43–78)
NRBC # BLD: 0 K/UL (ref 0–0.2)
PLATELET # BLD AUTO: 98 K/UL (ref 150–450)
PMV BLD AUTO: 11.2 FL (ref 9.4–12.3)
POTASSIUM SERPL-SCNC: 3.7 MMOL/L (ref 3.5–5.1)
PROT SERPL-MCNC: 6.7 G/DL (ref 6.3–8.2)
RBC # BLD AUTO: 4.15 M/UL (ref 4.05–5.2)
SODIUM SERPL-SCNC: 139 MMOL/L (ref 136–145)
WBC # BLD AUTO: 3.3 K/UL (ref 4.3–11.1)

## 2022-10-05 PROCEDURE — 83735 ASSAY OF MAGNESIUM: CPT

## 2022-10-05 PROCEDURE — 82378 CARCINOEMBRYONIC ANTIGEN: CPT

## 2022-10-05 PROCEDURE — 85025 COMPLETE CBC W/AUTO DIFF WBC: CPT

## 2022-10-05 PROCEDURE — 80053 COMPREHEN METABOLIC PANEL: CPT

## 2022-10-05 PROCEDURE — 99215 OFFICE O/P EST HI 40 MIN: CPT | Performed by: INTERNAL MEDICINE

## 2022-10-05 PROCEDURE — 36415 COLL VENOUS BLD VENIPUNCTURE: CPT

## 2022-10-05 RX ORDER — CAPECITABINE 500 MG/1
TABLET, FILM COATED ORAL
Qty: 40 TABLET | Refills: 0 | Status: SHIPPED | OUTPATIENT
Start: 2022-11-28

## 2022-10-05 RX ORDER — CAPECITABINE 150 MG/1
TABLET, FILM COATED ORAL
Qty: 40 TABLET | Refills: 0 | Status: SHIPPED | OUTPATIENT
Start: 2022-11-28

## 2022-10-05 RX ORDER — CAPECITABINE 150 MG/1
TABLET, FILM COATED ORAL
Qty: 80 TABLET | Refills: 0 | Status: SHIPPED | OUTPATIENT
Start: 2022-10-31

## 2022-10-05 RX ORDER — CAPECITABINE 500 MG/1
TABLET, FILM COATED ORAL
Qty: 80 TABLET | Refills: 0 | Status: SHIPPED | OUTPATIENT
Start: 2022-10-31

## 2022-10-05 ASSESSMENT — PATIENT HEALTH QUESTIONNAIRE - PHQ9
SUM OF ALL RESPONSES TO PHQ QUESTIONS 1-9: 0
SUM OF ALL RESPONSES TO PHQ QUESTIONS 1-9: 0
SUM OF ALL RESPONSES TO PHQ9 QUESTIONS 1 & 2: 0
SUM OF ALL RESPONSES TO PHQ QUESTIONS 1-9: 0
2. FEELING DOWN, DEPRESSED OR HOPELESS: 0
SUM OF ALL RESPONSES TO PHQ QUESTIONS 1-9: 0
1. LITTLE INTEREST OR PLEASURE IN DOING THINGS: 0

## 2022-10-05 NOTE — PROGRESS NOTES
[facility] Hematology & Oncology: Office Visit Progress Note    Chief Complaint:    Rectal cancer    History of Present Illness:  Reason for Referral: Rectal cancer     Referring Provider:  Maia Gamez MD     Primary Care Provider: Kathie Frederick DO     Family History of Cancer/Hematologic Disorders: None noted     Presenting Symptoms: LLQ abdominal pain     Ms. Sherice Dent is a 51 y.o.  female who reports to have never used tobacco products. She denies use of alcohol or drug substances. Her medical history reports as rectal bleeding. Her surgical history reports as  and colonoscopy. On 7/15/22, Ms. Sherice Dent saw Dr Meño High for her newly diagnosed rectal cancer. She reported to have had rectal bleeding for 2 months which prompted her first colonoscopy. A 5 cm half circumferential friable mass was identified at the most proximal valve of Singh, biopsy revealed at least carcinoma in situ. 2022 CT scan of c/a/p demonstrated no distal metastases. She reported intermittent left lower quadrant abdominal pain for years. She has EUS scheduled for August however Dr Meño High reached out to Dr Aleksandra Alvarado to move up her planned EUS. A referral was placed to hematology for consideration of neoadjuvant chemoradiation. Currently there is no referral to radiation oncology. Her 2022 CBC and CMP reported WNL except for a low creatinine of 0.47.     22 Colonoscopy         2022 Surgical Pathology  DIAGNOSIS  A:  \"RECTAL MASS BIOPSIES\":        FRAGMENTS OF AT LEAST IN SITU ADENOCARCINOMA. SEE COMMENT. COMMENT: BECAUSE BIOPSY FRAGMENTS ARE LIMITED PRIMARIALY TO MUCOSA,   SUBMUCOSAL INFILTRATION CANNOT BE RULED OUT   B:  \"RANDOM COLON BIOPSIES\":        FRAGMENTS OF HISTOLOGICALLY UNREMARKABLE LARGE INTESTINE   C:  \"ASCENDING COLON POLYP\":        FRAGMENT OF MIXED TUBULOVILLOUS ADENOMA     22 CT Chest Abdomen and Pelvis  FINDINGS:  Lungs: No pulmonary nodules. No airspace disease.   Airways: Trachea and proximal bronchi grossly patent. Pleura: No effusion or thickening or calcifications. Lymph Nodes: No enlarged axillary, hilar or mediastinal lymph nodes. Heart: Normal size. Coronaries: No definite calcifications. Aorta: Normal caliber. Esophagus: Thickening of the distal esophagus  Skeletal/Chest Wall: No gross bony lesions. Liver: A 4 mm cyst anterior segment right lobe, axial image 47, otherwise  unremarkable. Gallbladder: No gallstones identified  Bile Ducts: Not dilated. Pancreas: Unremarkable. Spleen: Uniform and normal size. Stomach: Unremarkable. Bowel: There is mural thickening of the rectum, probably a cyst 6 cm in length. Margins are indistinct. Small nodular densities probably represent mesorectal  node. Adrenals: Are normal size. Kidneys/Ureters: Enhance symmetrically. No hydronephrosis. Lymph Nodes: No grossly enlarged retroperitoneal, mesenteric, or pelvic  adenopathy. Vasculature: Aorta is normal caliber. Bones: No gross bony lesions. Other: No ascites. Impression  Mural thickening of the rectum suspicious for neoplasm. Margins are  indistinct. Probable small mesorectal lymph nodes. There is one small cyst in  liver. No pulmonary nodules. PET [id number]:  1. Hypermetabolic rectal mass consistent with known rectal malignancy. No   evidence of metastatic disease in the neck, chest, abdomen or pelvis. Small   perirectal lymph nodes are present but may be too small to accurately   characterize by PET imaging. No FDG activity noted in these nodes. Review of Systems:  Constitutional Fatigue. Denies fever or chills. Denies weight loss or appetite changes. Denies anorexia. HEENT Denies trauma, bluring vision, hearing loss, ear pain, nosebleeds, sore throat, neck pain and ear discharge. Skin Denies lesions or rashes. Lungs Denies shortness of breath, cough, sputum production or hemoptysis. Cardiovascular Denies chest pain, palpitations, orthopnea, claudication and leg swelling. Gastrointestinal Blood in stool. Nausea. Denies vomiting. Denies abdominal pain.  Denies dysuria, frequency or hesitancy of urination   Neuro Peripheral neuropathy. Denies headaches, visual changes or ataxia. Denies dizziness, tingling, tremors,  speech change, focal weakness and headaches. Hematology Denies nasal/gum bleeding, denies easy bruise   Endo Denies heat/cold intolerance, denies diabetes. MSK Denies back pain, swollen legs, myalgias and falls. Psychiatric/Behavioral Denies depression and substance abuse. The patient is not nervous/anxious.        Allergies   Allergen Reactions    Amoxicillin Rash     Past Medical History:   Diagnosis Date    Cancer ([facility] [address]     rectal cancer-- dx 2022---- followed by dr Pedro Hoffmann-- per spouse after scans/test  then will determine what the plan is    Rectal bleeding     onset 2022     Past Surgical History:   Procedure Laterality Date     SECTION      COLONOSCOPY      COLONOSCOPY N/A 2022    COLONOSCOPY ULTRASOUND/ BMI 19 performed by Daniel Lui MD at [facility] ENDOSCOPY    IR PORT PLACEMENT EQUAL OR GREATER THAN 5 YEARS  2022    IR PORT PLACEMENT EQUAL OR GREATER THAN 5 YEARS 2022 [facility] SPECIALS     Family History   Problem Relation Age of Onset    High Blood Pressure Mother      Social History     Socioeconomic History    Marital status:      Spouse name: Not on file    Number of children: Not on file    Years of education: Not on file    Highest education level: Not on file   Occupational History    Not on file   Tobacco Use    Smoking status: Never    Smokeless tobacco: Never   Vaping Use    Vaping Use: Never used   Substance and Sexual Activity    Alcohol use: Yes     Comment: rare    Drug use: Never    Sexual activity: Not on file   Other Topics Concern    Not on file   Social History Narrative    Not on file     Social Determinants of Health     Financial Resource Strain: Not on file   Food Insecurity: Not on file   Transportation Needs: Not on file   Physical Activity: Not on file   Stress: Not on file   Social Connections: Not on file   Intimate Partner Violence: Not on file   Housing Stability: Not on file     Current Outpatient Medications   Medication Sig Dispense Refill    prochlorperazine (COMPAZINE) 10 MG tablet Take 1 tablet by mouth every 6 hours as needed (nausea or vomiting) 120 tablet 2    ondansetron (ZOFRAN) 4 MG tablet Take 1 tablet by mouth every 8 hours as needed for Nausea or Vomiting 90 tablet 2    lidocaine-prilocaine (EMLA) 2.5-2.5 % cream Apply topically as needed. Place small amount to port site 45 minutes prior to blood draws and/or infusion. Cover with plastic wrap 30 g 2     No current facility-administered medications for this visit. Facility-Administered Medications Ordered in Other Visits   Medication Dose Route Frequency Provider Last Rate Last Admin    sodium chloride flush 0.9 % injection 5-40 mL  5-40 mL IntraVENous PRN Gloria Escobar MD   10 mL at 09/24/22 1644       OBJECTIVE:  /82 (Site: Right Upper Arm, Position: Standing)   Pulse 90   Temp 98 °F (36.7 °C) (Oral)   Resp 18   Ht 5' 1\" (1.549 m)   Wt 114 lb 12.8 oz (52.1 kg)   SpO2 98%   BMI 21.69 kg/m²     Physical Exam:  Constitutional: Oriented to person, place, and time. Well-developed and well-nourished. HEENT: Normocephalic and atraumatic. Oropharynx is clear and moist.   Conjunctivae and EOM are normal. Pupils are equal, round, and reactive to light. No scleral icterus. Neck supple. No JVD present. No tracheal deviation present. No thyromegaly present. Lymph node No palpable submandibular, cervical, supraclavicular, axillary and inguinal lymph nodes. Skin Warm and dry. No bruising and no rash noted. No erythema. No pallor. Respiratory Effort normal and breath sounds normal.  No respiratory distress. No wheezes. No rales. No tenderness.     CVS Normal rate, regular rhythm and normal heart sounds. Exam reveals no gallop, no friction and no rub. No murmur heard. Abdomen Soft. Bowel sounds are normal. Exhibits no distension. There is no tenderness. There is no rebound and no guarding. Neuro Normal reflexes. No cranial nerve deficit. Exhibits normal muscle tone, 5 of 5 strength of all extremities. MSK Normal range of motion in general.  No edema and no tenderness.    Psych Normal mood, affect, behavior, judgment and thought content      Labs:  Recent Results (from the past 24 hour(s))   CBC with Auto Differential    Collection Time: 10/05/22 11:15 AM   Result Value Ref Range    WBC 3.3 (L) 4.3 - 11.1 K/uL    RBC 4.15 4.05 - 5.2 M/uL    Hemoglobin 12.8 11.7 - 15.4 g/dL    Hematocrit 38.4 35.8 - 46.3 %    MCV 92.5 79.6 - 97.8 FL    MCH 30.8 26.1 - 32.9 PG    MCHC 33.3 31.4 - 35.0 g/dL    RDW 17.6 (H) 11.9 - 14.6 %    Platelets 98 (L) 927 - 450 K/uL    MPV 11.2 9.4 - 12.3 FL    nRBC 0.00 0.0 - 0.2 K/uL    Differential Type AUTOMATED      Seg Neutrophils 46 43 - 78 %    Lymphocytes 34 13 - 44 %    Monocytes 18 (H) 4.0 - 12.0 %    Eosinophils % 2 0.5 - 7.8 %    Basophils 0 0.0 - 2.0 %    Immature Granulocytes 0 0.0 - 5.0 %    Segs Absolute 1.5 (L) 1.7 - 8.2 K/UL    Absolute Lymph # 1.1 0.5 - 4.6 K/UL    Absolute Mono # 0.6 0.1 - 1.3 K/UL    Absolute Eos # 0.1 0.0 - 0.8 K/UL    Basophils Absolute 0.0 0.0 - 0.2 K/UL    Absolute Immature Granulocyte 0.0 0.0 - 0.5 K/UL   Comprehensive Metabolic Panel    Collection Time: 10/05/22 11:15 AM   Result Value Ref Range    Sodium 139 136 - 145 mmol/L    Potassium 3.7 3.5 - 5.1 mmol/L    Chloride 107 101 - 110 mmol/L    CO2 27 21 - 32 mmol/L    Anion Gap 5 4 - 13 mmol/L    Glucose 125 (H) 65 - 100 mg/dL    BUN 13 6 - 23 MG/DL    Creatinine 0.60 0.6 - 1.0 MG/DL    Est, Glom Filt Rate >60 >60 ml/min/1.73m2    Calcium 8.7 8.3 - 10.4 MG/DL    Total Bilirubin 0.2 0.2 - 1.1 MG/DL    ALT 82 (H) 12 - 65 U/L    AST 42 (H) 15 - 37 U/L    Alk Phosphatase 70 50 - 136 U/L Total Protein 6.7 6.3 - 8.2 g/dL    Albumin 3.3 (L) 3.5 - 5.0 g/dL    Globulin 3.4 2.3 - 3.5 g/dL    Albumin/Globulin Ratio 1.0 (L) 1.2 - 3.5     Magnesium    Collection Time: 10/05/22 11:15 AM   Result Value Ref Range    Magnesium 2.3 1.8 - 2.4 mg/dL   CEA    Collection Time: 10/05/22 11:15 AM   Result Value Ref Range    CEA 1.7 0.0 - 3.0 ng/mL       Imaging:  No results found for this or any previous visit. ASSESSMENT/PLAN:   Diagnosis Orders   1. Rectal cancer (HCC)  CBC with Auto Differential    Comprehensive Metabolic Panel    Magnesium      2. Chemotherapy-induced nausea        3. Fatigue due to treatment        4. High risk medication use        5. Neuropathy        6. Thrombocytopenia ([facility] [address]            51 y.o. F urgently consulted for rectal carcinoma.   She had good general baseline health, developed frequency of bowel movement and blood in stool for 2 months, colonoscopy showed low rectal adenocarcinoma and urgently consulted to oncology, discussed carcinogenesis and staging, urgent arrangement of EUS reported T3N0 disease but quite large avid disease on PET with small lymph nodes, discussed at tumor board and concern of level of local invasion, arrange rectal MRI, discussed neoadjuvant treatment options with patient and wife and desired to treat aggressively for maximal chance of CR, arrange JENNIFER in the manner of Prodige 23 (Neoadjuvant rectal cancer treatment with JENNIFER using three months of modified FOLFIRINOX (oxaliplatin 85 mg/m2, leucovorin 400 mg/m2, irinotecan 180 mg/m2 day 1, and FU 2400 mg/m2 over 46 hours every 14 days) followed by long-course CRT (50 Gy in 25 fractions plus concurrent capecitabine), TME, and three additional months of FOLFOX or capecitabine), we discussed toxicities and management, arrange port placement, antiemetics, Emla, IV iron, rectal MRI confirmed chief receive M0 disease, proceed to cycle 1 neoadjuvant FOLFIRINOX 8/11/2022, generally tolerated well, 4 bowel movements a day but not watery, there was left lower quadrant pain, nausea, discussed management accordingly, labs reviewed and CEA down, signs of clinical response but complaining of increased fatigue, increased neuropathy, thrombocytopenic, discussed the risk of chronic neuropathy and patient desired to continue and reduce oxaliplatin to 60 mg/m2 from cycle 5, continue Compazine and Zofran for nausea control, refer to radiation oncology to start working Xeloda radiation after cycle 6, return in 2 weeks for cycle 6 but call as needed. All questions are answered to their satisfaction. They will call for further questions and concerns. ECOG PERFORMANCE STATUS - 0-Fully active, able to carry on all pre-disease performance without restriction. Pain - 2/10. None/Minimal pain - not affecting QOL     Fatigue - No flowsheet data found. Distress - No flowsheet data found. Elements of this note have been dictated via voice recognition software. Text and phrases may be limited by the accuracy and autoconversion of the software. The chart has been reviewed, but errors may still be present. Linda Ortiz M.D.   [address]  Office : [phone]  Fax : [phone]

## 2022-10-05 NOTE — PROGRESS NOTES
10/5/22 saw pt today with Dr. Cody Villeda for pre chemo cycle 5 FOLFIRINOX. Will dose reduce this cycle due to neuropathy and low platelets. Plan to start xeloda/radiation after 6-7 cycles. Referral to radiation. Will arrange oral chemo education. Infusion tomorrow. Follow up in 2 weeks. Encouraged to call with any concerns. Navigation will continue to follow.       Xeloda Rx sent to [facility]

## 2022-10-05 NOTE — PATIENT INSTRUCTIONS
Patient Instructions from Today's Visit    Reason for Visit:  Pre chemo cycle 5 FOLFIRINOX    Plan:   Will dose reduce this cycle due to neuropathy and low platelet count  Referral to radiation for concurrent xeloda/radiation after cycle 6   Xeloda prescription will be sent to specialty pharmacy, do not start until we start radiation   Infusion tomorrow    Follow Up:  2 weeks    Recent Lab Results:  Hospital Outpatient Visit on 10/05/2022   Component Date Value Ref Range Status    WBC 10/05/2022 3.3 (A)  4.3 - 11.1 K/uL Final    RBC 10/05/2022 4.15  4.05 - 5.2 M/uL Final    Hemoglobin 10/05/2022 12.8  11.7 - 15.4 g/dL Final    Hematocrit 10/05/2022 38.4  35.8 - 46.3 % Final    MCV 10/05/2022 92.5  79.6 - 97.8 FL Final    MCH 10/05/2022 30.8  26.1 - 32.9 PG Final    MCHC 10/05/2022 33.3  31.4 - 35.0 g/dL Final    RDW 10/05/2022 17.6 (A)  11.9 - 14.6 % Final    Platelets 10/42/6799 98 (A)  150 - 450 K/uL Final    MPV 10/05/2022 11.2  9.4 - 12.3 FL Final    nRBC 10/05/2022 0.00  0.0 - 0.2 K/uL Final    **Note: Absolute NRBC parameter is now reported with Hemogram**    Differential Type 10/05/2022 AUTOMATED    Final    Seg Neutrophils 10/05/2022 46  43 - 78 % Final    Lymphocytes 10/05/2022 34  13 - 44 % Final    Monocytes 10/05/2022 18 (A)  4.0 - 12.0 % Final    Eosinophils % 10/05/2022 2  0.5 - 7.8 % Final    Basophils 10/05/2022 0  0.0 - 2.0 % Final    Immature Granulocytes 10/05/2022 0  0.0 - 5.0 % Final    Segs Absolute 10/05/2022 1.5 (A)  1.7 - 8.2 K/UL Final    Absolute Lymph # 10/05/2022 1.1  0.5 - 4.6 K/UL Final    Absolute Mono # 10/05/2022 0.6  0.1 - 1.3 K/UL Final    Absolute Eos # 10/05/2022 0.1  0.0 - 0.8 K/UL Final    Basophils Absolute 10/05/2022 0.0  0.0 - 0.2 K/UL Final    Absolute Immature Granulocyte 10/05/2022 0.0  0.0 - 0.5 K/UL Final    Sodium 10/05/2022 139  136 - 145 mmol/L Final    Potassium 10/05/2022 3.7  3.5 - 5.1 mmol/L Final    Chloride 10/05/2022 107  101 - 110 mmol/L Final    CO2 10/05/2022 27  21 - 32 mmol/L Final    Anion Gap 10/05/2022 5  4 - 13 mmol/L Final    Glucose 10/05/2022 125 (A)  65 - 100 mg/dL Final    BUN 10/05/2022 13  6 - 23 MG/DL Final    Creatinine 10/05/2022 0.60  0.6 - 1.0 MG/DL Final    Est, Glom Filt Rate 10/05/2022 >60  >60 ml/min/1.73m2 Final    Comment:      Pediatric calculator link: Tres.at. org/professionals/kdoqi/gfr_calculatorped       Effective Oct 3, 2022       These results are not intended for use in patients <25years of age. eGFR results are calculated without a race factor using  the 2021 CKD-EPI equation. Careful clinical correlation is recommended, particularly when comparing to results calculated using previous equations. The CKD-EPI equation is less accurate in patients with extremes of muscle mass, extra-renal metabolism of creatinine, excessive creatine ingestion, or following therapy that affects renal tubular secretion. Calcium 10/05/2022 8.7  8.3 - 10.4 MG/DL Final    Total Bilirubin 10/05/2022 0.2  0.2 - 1.1 MG/DL Final    ALT 10/05/2022 82 (A)  12 - 65 U/L Final    AST 10/05/2022 42 (A)  15 - 37 U/L Final    Alk Phosphatase 10/05/2022 70  50 - 136 U/L Final    Total Protein 10/05/2022 6.7  6.3 - 8.2 g/dL Final    Albumin 10/05/2022 3.3 (A)  3.5 - 5.0 g/dL Final    Globulin 10/05/2022 3.4  2.3 - 3.5 g/dL Final    Albumin/Globulin Ratio 10/05/2022 1.0 (A)  1.2 - 3.5   Final    Magnesium 10/05/2022 2.3  1.8 - 2.4 mg/dL Final    CEA 10/05/2022 1.7  0.0 - 3.0 ng/mL Final    Comment: Nonsmoker:  <3.0 ng/mL  Smoker:     <5.0 ng/mL  [employer]. Patient's results of tumor marker testing may not be comparable to labs using different manufacturers/methods.            Treatment Summary has been discussed and given to patient: no        -------------------------------------------------------------------------------------------------------------------  Please call our office at [phone] if you have any  of the following symptoms:   Fever of 100.5 or greater  Chills  Shortness of breath  Swelling or pain in one leg    After office hours an answering service is available and will contact a provider for emergencies or if you are experiencing any of the above symptoms. Patient did express an interest in My Chart. My Chart log in information explained on the after visit summary printout at the [facility] [address] [address] desk. VISHNU Levin RN  Nurse Navigator  [address] [place] [address]  [phone]    Chemotherapy/Agent Information:     Diagnosis: rectal cancer     Goal of Therapy: _ _ Palliative      _x_Curative         Name of Chemotherapy medications: oral xeloda with radiation    Number of Cycles Planned: 6                  Length of Cycle:  1 week      Chemotherapy plan is subject to change at your provider's discretion    A copy of this plan has been discussed and given to patient by Doreen Issa RN.     Chemo Education:   Scheduled  will arrange  Previously reviewed will arrange    Consent for therapy:   Completed on will sign at education session

## 2022-10-06 ENCOUNTER — HOSPITAL ENCOUNTER (OUTPATIENT)
Dept: INFUSION THERAPY | Age: 47
Discharge: HOME | End: 2022-10-06
Payer: COMMERCIAL

## 2022-10-06 ENCOUNTER — CLINICAL DOCUMENTATION (OUTPATIENT)
Dept: ONCOLOGY | Age: 47
End: 2022-10-06

## 2022-10-06 VITALS
HEART RATE: 87 BPM | TEMPERATURE: 98 F | WEIGHT: 114 LBS | SYSTOLIC BLOOD PRESSURE: 111 MMHG | OXYGEN SATURATION: 95 % | DIASTOLIC BLOOD PRESSURE: 77 MMHG | BODY MASS INDEX: 21.54 KG/M2 | RESPIRATION RATE: 16 BRPM

## 2022-10-06 DIAGNOSIS — C20 RECTAL CANCER (HCC): Primary | ICD-10-CM

## 2022-10-06 PROCEDURE — 96368 THER/DIAG CONCURRENT INF: CPT

## 2022-10-06 PROCEDURE — 6360000002 HC RX W HCPCS: Performed by: INTERNAL MEDICINE

## 2022-10-06 PROCEDURE — G0498 CHEMO EXTEND IV INFUS W/PUMP: HCPCS

## 2022-10-06 PROCEDURE — 96417 CHEMO IV INFUS EACH ADDL SEQ: CPT

## 2022-10-06 PROCEDURE — 96413 CHEMO IV INFUSION 1 HR: CPT

## 2022-10-06 PROCEDURE — 96372 THER/PROPH/DIAG INJ SC/IM: CPT

## 2022-10-06 PROCEDURE — 2580000003 HC RX 258: Performed by: INTERNAL MEDICINE

## 2022-10-06 PROCEDURE — 96367 TX/PROPH/DG ADDL SEQ IV INF: CPT

## 2022-10-06 PROCEDURE — 96415 CHEMO IV INFUSION ADDL HR: CPT

## 2022-10-06 PROCEDURE — 96375 TX/PRO/DX INJ NEW DRUG ADDON: CPT

## 2022-10-06 RX ORDER — ONDANSETRON 2 MG/ML
8 INJECTION INTRAMUSCULAR; INTRAVENOUS ONCE
Status: COMPLETED | OUTPATIENT
Start: 2022-10-06 | End: 2022-10-06

## 2022-10-06 RX ORDER — ATROPINE SULFATE 0.4 MG/ML
0.4 INJECTION, SOLUTION INTRAVENOUS ONCE
Status: COMPLETED | OUTPATIENT
Start: 2022-10-06 | End: 2022-10-06

## 2022-10-06 RX ORDER — DEXTROSE MONOHYDRATE 50 MG/ML
5-250 INJECTION, SOLUTION INTRAVENOUS PRN
Status: DISCONTINUED | OUTPATIENT
Start: 2022-10-06 | End: 2022-10-07 | Stop reason: HOSPADM

## 2022-10-06 RX ORDER — ONDANSETRON 2 MG/ML
8 INJECTION INTRAMUSCULAR; INTRAVENOUS
Status: DISCONTINUED | OUTPATIENT
Start: 2022-10-06 | End: 2022-10-07 | Stop reason: HOSPADM

## 2022-10-06 RX ORDER — LORAZEPAM 2 MG/ML
1 INJECTION INTRAMUSCULAR ONCE
Status: COMPLETED | OUTPATIENT
Start: 2022-10-06 | End: 2022-10-06

## 2022-10-06 RX ORDER — SODIUM CHLORIDE 0.9 % (FLUSH) 0.9 %
5-40 SYRINGE (ML) INJECTION PRN
Status: DISCONTINUED | OUTPATIENT
Start: 2022-10-06 | End: 2022-10-07 | Stop reason: HOSPADM

## 2022-10-06 RX ADMIN — LEUCOVORIN CALCIUM 572 MG: 200 INJECTION, POWDER, LYOPHILIZED, FOR SOLUTION INTRAMUSCULAR; INTRAVENOUS at 11:53

## 2022-10-06 RX ADMIN — SODIUM CHLORIDE, PRESERVATIVE FREE 10 ML: 5 INJECTION INTRAVENOUS at 08:28

## 2022-10-06 RX ADMIN — LORAZEPAM 1 MG: 2 INJECTION INTRAMUSCULAR; INTRAVENOUS at 13:35

## 2022-10-06 RX ADMIN — FOSAPREPITANT DIMEGLUMINE 150 MG: 150 INJECTION, POWDER, LYOPHILIZED, FOR SOLUTION INTRAVENOUS at 09:14

## 2022-10-06 RX ADMIN — IRINOTECAN HYDROCHLORIDE 260 MG: 20 INJECTION, SOLUTION INTRAVENOUS at 11:54

## 2022-10-06 RX ADMIN — OXALIPLATIN 85 MG: 5 INJECTION, SOLUTION INTRAVENOUS at 09:46

## 2022-10-06 RX ADMIN — ATROPINE SULFATE 0.4 MG: 0.4 INJECTION, SOLUTION INTRAVENOUS at 11:48

## 2022-10-06 RX ADMIN — DEXTROSE MONOHYDRATE 100 ML/HR: 50 INJECTION, SOLUTION INTRAVENOUS at 08:29

## 2022-10-06 RX ADMIN — FLUOROURACIL 3425 MG: 50 INJECTION, SOLUTION INTRAVENOUS at 14:00

## 2022-10-06 RX ADMIN — DEXAMETHASONE SODIUM PHOSPHATE 12 MG: 4 INJECTION, SOLUTION INTRAMUSCULAR; INTRAVENOUS at 09:00

## 2022-10-06 RX ADMIN — ONDANSETRON 8 MG: 2 INJECTION INTRAMUSCULAR; INTRAVENOUS at 08:57

## 2022-10-06 NOTE — PROGRESS NOTES
[facility] Folfirinox completed. Any issues or concerns during appointment: Nausea and vomiting. Patient given ativan per order. Patient reports relief with ativan administration. Patient aware of next infusion appointment on 10/8/22 (date) at 36 (time). Patient aware of next lab and [facility] office visit on 10/18/22 (date) at 80 (time). Patient instructed to call provider with temperature of 100.4 or greater or nausea/vomiting/ diarrhea or pain not controlled by medications  Discharged ambulatory.

## 2022-10-08 ENCOUNTER — HOSPITAL ENCOUNTER (OUTPATIENT)
Dept: INFUSION THERAPY | Age: 47
Discharge: HOME | End: 2022-10-08
Payer: COMMERCIAL

## 2022-10-08 VITALS
TEMPERATURE: 97.7 F | RESPIRATION RATE: 16 BRPM | DIASTOLIC BLOOD PRESSURE: 70 MMHG | SYSTOLIC BLOOD PRESSURE: 108 MMHG | HEART RATE: 85 BPM | OXYGEN SATURATION: 96 %

## 2022-10-08 DIAGNOSIS — C20 RECTAL CANCER (HCC): Primary | ICD-10-CM

## 2022-10-08 PROCEDURE — 2580000003 HC RX 258: Performed by: INTERNAL MEDICINE

## 2022-10-08 PROCEDURE — 96523 IRRIG DRUG DELIVERY DEVICE: CPT

## 2022-10-08 RX ORDER — SODIUM CHLORIDE 9 MG/ML
5-40 INJECTION INTRAVENOUS PRN
Status: DISCONTINUED | OUTPATIENT
Start: 2022-10-08 | End: 2022-10-09 | Stop reason: HOSPADM

## 2022-10-08 RX ADMIN — SODIUM CHLORIDE, PRESERVATIVE FREE 10 ML: 5 INJECTION INTRAVENOUS at 13:39

## 2022-10-08 ASSESSMENT — PAIN SCALES - GENERAL: PAINLEVEL_OUTOF10: 0

## 2022-10-08 NOTE — PROGRESS NOTES
Arrived to the [facility]. Assessment completed . 5 FU Elastomeric pump completed. Patient tolerated without problems. Any issues or concerns during appointment: None  Patient instructed to call provider with temperature of 100.4 or greater or nausea/vomiting/ diarrhea or pain not controlled by medications  Instructed to call Dr Gisela Bartholomew  with any side effects or concerns  Patient aware of next infusion appointment on 10/20/22(date) at 8 AM (time).   Discharged ambulatory with spouse

## 2022-10-12 ENCOUNTER — HOSPITAL ENCOUNTER (OUTPATIENT)
Dept: RADIATION ONCOLOGY | Age: 47
Setting detail: RECURRING SERIES
Discharge: HOME | End: 2022-10-15
Payer: COMMERCIAL

## 2022-10-12 VITALS
HEART RATE: 98 BPM | WEIGHT: 113.5 LBS | DIASTOLIC BLOOD PRESSURE: 86 MMHG | OXYGEN SATURATION: 97 % | BODY MASS INDEX: 21.45 KG/M2 | SYSTOLIC BLOOD PRESSURE: 117 MMHG | TEMPERATURE: 98.9 F

## 2022-10-12 PROCEDURE — 77470 SPECIAL RADIATION TREATMENT: CPT

## 2022-10-12 PROCEDURE — 99211 OFF/OP EST MAY X REQ PHY/QHP: CPT

## 2022-10-12 ASSESSMENT — PAIN DESCRIPTION - LOCATION: LOCATION: ABDOMEN

## 2022-10-12 ASSESSMENT — PAIN SCALES - GENERAL: PAINLEVEL_OUTOF10: 1

## 2022-10-12 NOTE — CONSULTS
RADIATION ONCOLOGY INITIAL VISIT  10/12/22    Today's visit was conducted in the Mandarin language with an  via the [place] system Donnice David #[id number]). Referring Provider: Presley Segura MD    Chief Complaint: Rectal cancer     History of Present Illness:  Diagnosis  51 y.o. female who presented with LLQ pain, diarrhea, and rectal bleeding. Colonoscopy 7/12/2022: Friable mass in the rectum, nonobstructing. Multiple biopsies were obtained and showed: Fragments of at least in situ adenocarcinoma. MSI could not be resulted in the absence of normal tissue. MSI-High not detected in tumor. CT CAP 7/12/2022: Rectal thickening. Probable small mesorectal lymph nodes. EUS 7/21/2022: 60% circumferential wall thickening 8-13 cm from the anal verge. Tumor penetration through muscularis propria. No pathologically enlarged lymph nodes. T3N0 by EUS. PET/CT 3/02/9618: Hypermetabolic rectal mass. No evidence of metastatic disease. Small perirectal lymph nodes present but too small to characterize by PET. MRI pelvis 8/3/2022: Rectal mass 6.1 cm in length, 6.8 cm from anal verge, circumferential with tumor penetration beyond muscularis propria and involvement of CRM posteriorly. Small scattered perirectal lymph nodes measuring up to 5 mm. T3N0 by MRI. Treatment  JENNIFER:  FOLFIRINOX August 2022-October 2022. Cycle 6 scheduled for 10/20/2022. Current symptoms/ROS:   She is doing fairly well. Reports nausea, neuropathy, and fatigue from chemotherapy. Intermittent rectal bleeding, frequent bowel movements (4x daily) but no diarrhea. Discomfort in LLQ, stable. Pregnant: no. LMP 6/14/22. Smoker: no  Prior radiation: no  Implanted cardiac devices: no  History of lupus or scleroderma: no  Family history of cancer: no  Genetic testing: no  Meds: Reviewed. Pertinent - none.   Can continue all current meds during RT    Physical Examination:  Vitals:    10/12/22 1115   BP: 117/86   Pulse: 98   Temp: 98.9 °F (37.2 °C)   TempSrc: Oral   SpO2: 97%   Weight: 113 lb 8 oz (51.5 kg)     Pain 1/10  ECOG 0-1    Well nourished, in NAD. Alert and oriented x3. Lungs CTAB. RRR. Abdomen soft, NTND, normal bowel sounds. JOY deferred. No LE edema. Assessment:  Cancer Staging  Rectal cancer [facility])  Staging form: Colon And Rectum, AJCC 8th Edition  - Clinical: Stage IIA (cT3, cN0, cM0) - Signed by Faina Dunn MD on 10/12/2022     Borderline perirectal lymph nodes on staging MRI     We reviewed the diagnosis, prognosis, and treatment options in accordance with the NCCN guidelines. She has already met with surgeon Dr. Mansoor Conde and medical oncologist Dr. Sherry Ordoñez. Her case was also presented at multidisciplinary conference. She has started total neoadjuvant therapy and has nearly completed the initial chemotherapy portion. She is here today to discuss the second phase of treatment consisting of concurrent chemoRT. This will be followed by surgery. We discussed the logistics, benefits, risks, and alternatives to external beam radiation therapy. Prior to the initiation of treatment, a CT simulation is performed for planning purposes. Potential side effects of EBRT to the pelvis include but are not limited to fatigue, skin changes, desquamation, decrease in blood counts, urinary symptoms, diarrhea, blood in the stool, nausea, vaginal discharge/irritation, chronic cystitis, proctitis, sexual dysfunction, ovarian failure, infertility, sacral insufficiency fracture, stricture, fistula, secondary malignancy. Treatment would be daily Monday-Friday for 5.5 weeks. Patient will be assessed by me weekly while under treatment for toxicity management. The goal of treatment is curative (pre-op), but there is a risk of treatment failure. Patient and her  had the opportunity to ask questions, all were answered to their satisfaction. She understood the discussion and wanted to proceed with radiation treatment.   Informed consent was obtained (via Mandarin ). Plan:  -Last cycle of chemo 10/20/22  -CT simulation 11/3/22 (pt declined pre-procedure pregnancy test)   -RT plan: 45 Gy/25 fx to pelvis + 5.4 Gy/3 fx rectal boost 3D-CRT  -RT to start tentatively 11/17/22 with Xeloda   -The patient has a documented plan of care to address pain. Pain is controlled on current regimen.     Yazmin Ovalles MD  10/12/22     I spent a total of 70 minutes today performing the following care related activities for Olivia Agosto:  Face to face exam/evaluation, Obtain/Review external records, /Educate Patient/Caregivers, Pre-Charting/Documenting after the visit, Interpreting/Ordering Meds, Tests, Procedures, and Discuss/Coordinate condition with providers

## 2022-10-12 NOTE — PROGRESS NOTES
Consult Rectal Cancer. Spouse present for consult. Radiation teaching completed using Mandarin Interpretor. Spouse speaks [place]. Plan is Xeloda/RT. Spouse states Xeloda to be delivered tomorrow. CONSENTS SIGNED FOR RT.  CT SIM SCHEDULED. APT GIVEN TO PT.  Dr. Segundo Tillman will reach out to NN regarding IV chemo end/ Xeloda and RT start date. Patricia Olsen

## 2022-10-13 ENCOUNTER — CLINICAL DOCUMENTATION (OUTPATIENT)
Dept: ONCOLOGY | Age: 47
End: 2022-10-13

## 2022-10-13 NOTE — PROGRESS NOTES
I spoke with Martine James and her  via the telephone regarding her current [id number] Coverage, potential oral medication authorizations, enrollment in the [employer] ([facility]) and the [address] [place] [address]), and assistance organization resource sheet. Next, I spoke with Martine James and her  regarding his insurance questions. I answered questions about medical treatments and services. Next, I spoke with Martine James and her  about deductibles, copayments, and out of pocket maximums regarding her current [employer]. Next, we discussed costs associated with the Xeloda Oral Medication. Next, I spoke with Martine James and her  regarding potential oral medication authorizations. I told them that if she ever had any problems getting her oral medications filled, to give the dedicated [facility]  a call. Most of the time, it is simply an authorization that needs to be done with the insurance company. Lastly, I spoke with Martine James and her  regarding a form with various resource organizations that could assist with specific needs (example:  transportation, lodging, preparing meals, home cleaning)  Martine James and her  will receive a folder with agency contact information, phone numbers. cancer programs, LPOA, my chart information, and my business card. Martine James and her  expressed understanding of the information above and all questions were answered to their satisfaction.

## 2022-10-18 ENCOUNTER — OFFICE VISIT (OUTPATIENT)
Dept: ONCOLOGY | Age: 47
End: 2022-10-18
Payer: COMMERCIAL

## 2022-10-18 VITALS
HEIGHT: 61 IN | SYSTOLIC BLOOD PRESSURE: 115 MMHG | OXYGEN SATURATION: 97 % | DIASTOLIC BLOOD PRESSURE: 74 MMHG | RESPIRATION RATE: 13 BRPM | WEIGHT: 114.6 LBS | BODY MASS INDEX: 21.64 KG/M2 | HEART RATE: 80 BPM | TEMPERATURE: 98.1 F

## 2022-10-18 DIAGNOSIS — C20 RECTAL CANCER (HCC): Primary | ICD-10-CM

## 2022-10-18 DIAGNOSIS — Z71.9 ENCOUNTER FOR EDUCATION: ICD-10-CM

## 2022-10-18 PROCEDURE — 99215 OFFICE O/P EST HI 40 MIN: CPT | Performed by: NURSE PRACTITIONER

## 2022-10-18 ASSESSMENT — PATIENT HEALTH QUESTIONNAIRE - PHQ9
SUM OF ALL RESPONSES TO PHQ9 QUESTIONS 1 & 2: 0
SUM OF ALL RESPONSES TO PHQ QUESTIONS 1-9: 0
SUM OF ALL RESPONSES TO PHQ QUESTIONS 1-9: 0
1. LITTLE INTEREST OR PLEASURE IN DOING THINGS: 0
SUM OF ALL RESPONSES TO PHQ QUESTIONS 1-9: 0
2. FEELING DOWN, DEPRESSED OR HOPELESS: 0
SUM OF ALL RESPONSES TO PHQ QUESTIONS 1-9: 0

## 2022-10-18 NOTE — PROGRESS NOTES
Halley Rojas is a 49 y.o. with ractal cancer who presents for chemotherapy education for the following medications:  Xeloda  Schedule and frequency of chemotherapy was discussed with patient. The following instructions regarding the handling and administration of oral chemotherapy were discussed. ...  - Wash hands before and after chemotherapy administration  - Store away from other medications and out of the reach of children or pets  - Never crush break or chew the medication  - Never \"double up on doses\" if a dose is missed  - It is okay to take a forgotten dose if it is within 3 hours of missed dose. - Return discontinued or unused medication to the clinic for proper disposal   - Report medications including herbal supplements to all providers    The patient was given handouts published by the [facility] entitled, \"Chemotherapy and You\" and \"Eating Hints Before, During, and After Cancer Treatment\" for their reference. Education was then given to the patient regarding the mechanism by which chemotherapy exerts its effects. It was explained that because chemotherapy affects all rapidly dividing cells, it not only affects the intended cancers cells, but can also effect cells in the GI tract, hair, mucous membranes, nails, and bone marrow. It was then communicated to the patient he/she may experience some of the following side effects. .... Time was then allowed to accept patient questions. The patient was instructed to call the office at [phone] for the following symptoms. .. FEVER >100.4 or shaking chills    Nausea (interferes with ability to eat and unrelieved with prescribed medication)  Vomiting (vomiting more than 4-5 times in a 24 hour period)   Diarrhea (4-6 episodes in a 24-hour period) despite anti-diarrhea medication and diet alterations.    Unusual bleeding or bruising   Black or tarry stools, or blood in your stools or urine   Extreme fatigue (unable to

## 2022-10-19 ENCOUNTER — OFFICE VISIT (OUTPATIENT)
Dept: ONCOLOGY | Age: 47
End: 2022-10-19
Payer: COMMERCIAL

## 2022-10-19 ENCOUNTER — HOSPITAL ENCOUNTER (OUTPATIENT)
Dept: LAB | Age: 47
Discharge: HOME | End: 2022-10-22
Payer: COMMERCIAL

## 2022-10-19 VITALS
RESPIRATION RATE: 18 BRPM | DIASTOLIC BLOOD PRESSURE: 79 MMHG | SYSTOLIC BLOOD PRESSURE: 113 MMHG | BODY MASS INDEX: 21.71 KG/M2 | HEART RATE: 93 BPM | TEMPERATURE: 98.2 F | OXYGEN SATURATION: 99 % | WEIGHT: 115 LBS | HEIGHT: 61 IN

## 2022-10-19 DIAGNOSIS — T45.1X5A CHEMOTHERAPY-INDUCED NAUSEA: ICD-10-CM

## 2022-10-19 DIAGNOSIS — C20 RECTAL CANCER (HCC): Primary | ICD-10-CM

## 2022-10-19 DIAGNOSIS — Z79.899 HIGH RISK MEDICATION USE: ICD-10-CM

## 2022-10-19 DIAGNOSIS — G62.9 NEUROPATHY: ICD-10-CM

## 2022-10-19 DIAGNOSIS — C20 RECTAL CANCER (HCC): ICD-10-CM

## 2022-10-19 DIAGNOSIS — R11.0 CHEMOTHERAPY-INDUCED NAUSEA: ICD-10-CM

## 2022-10-19 LAB
ALBUMIN SERPL-MCNC: 3.6 G/DL (ref 3.5–5)
ALBUMIN/GLOB SERPL: 1 {RATIO} (ref 0.4–1.6)
ALP SERPL-CCNC: 75 U/L (ref 50–136)
ALT SERPL-CCNC: 58 U/L (ref 12–65)
ANION GAP SERPL CALC-SCNC: 5 MMOL/L (ref 2–11)
AST SERPL-CCNC: 27 U/L (ref 15–37)
BASOPHILS # BLD: 0 K/UL (ref 0–0.2)
BASOPHILS NFR BLD: 0 % (ref 0–2)
BILIRUB SERPL-MCNC: 0.2 MG/DL (ref 0.2–1.1)
BUN SERPL-MCNC: 15 MG/DL (ref 6–23)
CALCIUM SERPL-MCNC: 9.2 MG/DL (ref 8.3–10.4)
CHLORIDE SERPL-SCNC: 107 MMOL/L (ref 101–110)
CO2 SERPL-SCNC: 28 MMOL/L (ref 21–32)
CREAT SERPL-MCNC: 0.5 MG/DL (ref 0.6–1)
DIFFERENTIAL METHOD BLD: ABNORMAL
EOSINOPHIL # BLD: 0.1 K/UL (ref 0–0.8)
EOSINOPHIL NFR BLD: 2 % (ref 0.5–7.8)
ERYTHROCYTE [DISTWIDTH] IN BLOOD BY AUTOMATED COUNT: 16.7 % (ref 11.9–14.6)
GLOBULIN SER CALC-MCNC: 3.6 G/DL (ref 2.8–4.5)
GLUCOSE SERPL-MCNC: 113 MG/DL (ref 65–100)
HCT VFR BLD AUTO: 40.1 % (ref 35.8–46.3)
HGB BLD-MCNC: 13.4 G/DL (ref 11.7–15.4)
IMM GRANULOCYTES # BLD AUTO: 0 K/UL (ref 0–0.5)
IMM GRANULOCYTES NFR BLD AUTO: 1 % (ref 0–5)
LYMPHOCYTES # BLD: 1.1 K/UL (ref 0.5–4.6)
LYMPHOCYTES NFR BLD: 33 % (ref 13–44)
MAGNESIUM SERPL-MCNC: 2.2 MG/DL (ref 1.8–2.4)
MCH RBC QN AUTO: 31.2 PG (ref 26.1–32.9)
MCHC RBC AUTO-ENTMCNC: 33.4 G/DL (ref 31.4–35)
MCV RBC AUTO: 93.5 FL (ref 82–102)
MONOCYTES # BLD: 0.5 K/UL (ref 0.1–1.3)
MONOCYTES NFR BLD: 14 % (ref 4–12)
NEUTS SEG # BLD: 1.7 K/UL (ref 1.7–8.2)
NEUTS SEG NFR BLD: 50 % (ref 43–78)
NRBC # BLD: 0 K/UL (ref 0–0.2)
PLATELET # BLD AUTO: 121 K/UL (ref 150–450)
PMV BLD AUTO: 11.6 FL (ref 9.4–12.3)
POTASSIUM SERPL-SCNC: 3.7 MMOL/L (ref 3.5–5.1)
PROT SERPL-MCNC: 7.2 G/DL (ref 6.3–8.2)
RBC # BLD AUTO: 4.29 M/UL (ref 4.05–5.2)
SODIUM SERPL-SCNC: 140 MMOL/L (ref 133–143)
WBC # BLD AUTO: 3.4 K/UL (ref 4.3–11.1)

## 2022-10-19 PROCEDURE — 36415 COLL VENOUS BLD VENIPUNCTURE: CPT

## 2022-10-19 PROCEDURE — 99215 OFFICE O/P EST HI 40 MIN: CPT | Performed by: INTERNAL MEDICINE

## 2022-10-19 PROCEDURE — 83735 ASSAY OF MAGNESIUM: CPT

## 2022-10-19 PROCEDURE — 80053 COMPREHEN METABOLIC PANEL: CPT

## 2022-10-19 PROCEDURE — 85025 COMPLETE CBC W/AUTO DIFF WBC: CPT

## 2022-10-19 ASSESSMENT — PATIENT HEALTH QUESTIONNAIRE - PHQ9
SUM OF ALL RESPONSES TO PHQ QUESTIONS 1-9: 0
2. FEELING DOWN, DEPRESSED OR HOPELESS: 0
SUM OF ALL RESPONSES TO PHQ9 QUESTIONS 1 & 2: 0
SUM OF ALL RESPONSES TO PHQ QUESTIONS 1-9: 0
1. LITTLE INTEREST OR PLEASURE IN DOING THINGS: 0

## 2022-10-19 NOTE — PROGRESS NOTES
patent. Pleura: No effusion or thickening or calcifications. Lymph Nodes: No enlarged axillary, hilar or mediastinal lymph nodes. Heart: Normal size. Coronaries: No definite calcifications. Aorta: Normal caliber. Esophagus: Thickening of the distal esophagus  Skeletal/Chest Wall: No gross bony lesions. Liver: A 4 mm cyst anterior segment right lobe, axial image 47, otherwise  unremarkable. Gallbladder: No gallstones identified  Bile Ducts: Not dilated. Pancreas: Unremarkable. Spleen: Uniform and normal size. Stomach: Unremarkable. Bowel: There is mural thickening of the rectum, probably a cyst 6 cm in length. Margins are indistinct. Small nodular densities probably represent mesorectal  node. Adrenals: Are normal size. Kidneys/Ureters: Enhance symmetrically. No hydronephrosis. Lymph Nodes: No grossly enlarged retroperitoneal, mesenteric, or pelvic  adenopathy. Vasculature: Aorta is normal caliber. Bones: No gross bony lesions. Other: No ascites. Impression  Mural thickening of the rectum suspicious for neoplasm. Margins are  indistinct. Probable small mesorectal lymph nodes. There is one small cyst in  liver. No pulmonary nodules. PET 46045:  1. Hypermetabolic rectal mass consistent with known rectal malignancy. No   evidence of metastatic disease in the neck, chest, abdomen or pelvis. Small   perirectal lymph nodes are present but may be too small to accurately   characterize by PET imaging. No FDG activity noted in these nodes. Review of Systems:  Constitutional Fatigue. Denies fever or chills. Denies weight loss or appetite changes. Denies anorexia. HEENT Denies trauma, bluring vision, hearing loss, ear pain, nosebleeds, sore throat, neck pain and ear discharge. Skin Denies lesions or rashes. Lungs Denies shortness of breath, cough, sputum production or hemoptysis. Cardiovascular Denies chest pain, palpitations, orthopnea, claudication and leg swelling. Gastrointestinal Blood in stool. Nausea. Denies vomiting. Denies abdominal pain.  Denies dysuria, frequency or hesitancy of urination   Neuro Peripheral neuropathy. Denies headaches, visual changes or ataxia. Denies dizziness, tingling, tremors,  speech change, focal weakness and headaches. Hematology Denies nasal/gum bleeding, denies easy bruise   Endo Denies heat/cold intolerance, denies diabetes. MSK Denies back pain, swollen legs, myalgias and falls. Psychiatric/Behavioral Denies depression and substance abuse. The patient is not nervous/anxious.        Allergies   Allergen Reactions    Amoxicillin Rash     Past Medical History:   Diagnosis Date    Cancer ([facility] [address]     rectal cancer-- dx 2022---- followed by dr Anna Nichols-- per spouse after scans/test  then will determine what the plan is    Rectal bleeding     onset 2022     Past Surgical History:   Procedure Laterality Date     SECTION      COLONOSCOPY      COLONOSCOPY N/A 2022    COLONOSCOPY ULTRASOUND/ BMI 19 performed by Nancy Wisdom MD at [facility] ENDOSCOPY    IR PORT PLACEMENT EQUAL OR GREATER THAN 5 YEARS  2022    IR PORT PLACEMENT EQUAL OR GREATER THAN 5 YEARS 2022 [facility] SPECI[facility]     Family History   Problem Relation Age of Onset    High Blood Pressure Mother      Social History     Socioeconomic History    Marital status:      Spouse name: Not on file    Number of children: Not on file    Years of education: Not on file    Highest education level: Not on file   Occupational History    Not on file   Tobacco Use    Smoking status: Never    Smokeless tobacco: Never   Vaping Use    Vaping Use: Never used   Substance and Sexual Activity    Alcohol use: Yes     Comment: rare    Drug use: Never    Sexual activity: Not on file   Other Topics Concern    Not on file   Social History Narrative    Not on file     Social Determinants of Health     Financial Resource Strain: Not on file   Food Insecurity: Not on file   Transportation Needs: Not on file   Physical Activity: Not on file   Stress: Not on file   Social Connections: Not on file   Intimate Partner Violence: Not on file   Housing Stability: Not on file     Current Outpatient Medications   Medication Sig Dispense Refill    prochlorperazine (COMPAZINE) 10 MG tablet Take 1 tablet by mouth every 6 hours as needed (nausea or vomiting) 120 tablet 2    ondansetron (ZOFRAN) 4 MG tablet Take 1 tablet by mouth every 8 hours as needed for Nausea or Vomiting 90 tablet 2    lidocaine-prilocaine (EMLA) 2.5-2.5 % cream Apply topically as needed. Place small amount to port site 45 minutes prior to blood draws and/or infusion. Cover with plastic wrap 30 g 2    [START ON 10/31/2022] capecitabine (XELODA) 150 MG chemo tablet 1300 mg (2- 500 mg tabs and 2- 150 mg tabs) BID Monday - Friday, radiation days only. This is for first 4 weeks of radiation. (Patient not taking: No sig reported) 80 tablet 0    [START ON 10/31/2022] capecitabine (XELODA) 500 MG chemo tablet 1300 mg (2- 500 mg tabs and 2- 150 mg tabs) BID Monday - Friday, radiation days only. This is for first 4 weeks of radiation. (Patient not taking: No sig reported) 80 tablet 0    [START ON 11/28/2022] capecitabine (XELODA) 150 MG chemo tablet 1300 mg (2- 500 mg tabs and 2- 150 mg tabs) BID Monday - Friday, radiation days only. This is for last 2 weeks of radiation. (Patient not taking: No sig reported) 40 tablet 0    [START ON 11/28/2022] capecitabine (XELODA) 500 MG chemo tablet 1300 mg (2- 500 mg tabs and 2- 150 mg tabs) BID Monday - Friday, radiation days only. This is for last 2 weeks of radiation. (Patient not taking: No sig reported) 40 tablet 0     No current facility-administered medications for this visit.      Facility-Administered Medications Ordered in Other Visits   Medication Dose Route Frequency Provider Last Rate Last Admin    sodium chloride flush 0.9 % injection 5-40 mL  5-40 mL IntraVENous PRN [place] Janet Bautista MD   10 mL at 09/24/22 1644       OBJECTIVE:  /79 (Site: Right Upper Arm, Position: Standing)   Pulse 93   Temp 98.2 °F (36.8 °C) (Oral)   Resp 18   Ht 5' 1\" (1.549 m)   Wt 115 lb (52.2 kg)   SpO2 99%   BMI 21.73 kg/m²     Physical Exam:  Constitutional: Oriented to person, place, and time. Well-developed and well-nourished. HEENT: Normocephalic and atraumatic. Oropharynx is clear and moist.   Conjunctivae and EOM are normal. Pupils are equal, round, and reactive to light. No scleral icterus. Neck supple. No JVD present. No tracheal deviation present. No thyromegaly present. Lymph node No palpable submandibular, cervical, supraclavicular, axillary and inguinal lymph nodes. Skin Warm and dry. No bruising and no rash noted. No erythema. No pallor. Respiratory Effort normal and breath sounds normal.  No respiratory distress. No wheezes. No rales. No tenderness. CVS Normal rate, regular rhythm and normal heart sounds. Exam reveals no gallop, no friction and no rub. No murmur heard. Abdomen Soft. Bowel sounds are normal. Exhibits no distension. There is no tenderness. There is no rebound and no guarding. Neuro Normal reflexes. No cranial nerve deficit. Exhibits normal muscle tone, 5 of 5 strength of all extremities. MSK Normal range of motion in general.  No edema and no tenderness.    Psych Normal mood, affect, behavior, judgment and thought content      Labs:  Recent Results (from the past 24 hour(s))   CBC with Auto Differential    Collection Time: 10/19/22 10:58 AM   Result Value Ref Range    WBC 3.4 (L) 4.3 - 11.1 K/uL    RBC 4.29 4.05 - 5.2 M/uL    Hemoglobin 13.4 11.7 - 15.4 g/dL    Hematocrit 40.1 35.8 - 46.3 %    MCV 93.5 82.0 - 102.0 FL    MCH 31.2 26.1 - 32.9 PG    MCHC 33.4 31.4 - 35.0 g/dL    RDW 16.7 (H) 11.9 - 14.6 %    Platelets 410 (L) 736 - 450 K/uL    MPV 11.6 9.4 - 12.3 FL    nRBC 0.00 0.0 - 0.2 K/uL    Differential Type AUTOMATED      Seg Neutrophils 50 43 - 78 %    Lymphocytes 33 13 - 44 %    Monocytes 14 (H) 4.0 - 12.0 %    Eosinophils % 2 0.5 - 7.8 %    Basophils 0 0.0 - 2.0 %    Immature Granulocytes 1 0.0 - 5.0 %    Segs Absolute 1.7 1.7 - 8.2 K/UL    Absolute Lymph # 1.1 0.5 - 4.6 K/UL    Absolute Mono # 0.5 0.1 - 1.3 K/UL    Absolute Eos # 0.1 0.0 - 0.8 K/UL    Basophils Absolute 0.0 0.0 - 0.2 K/UL    Absolute Immature Granulocyte 0.0 0.0 - 0.5 K/UL   Comprehensive Metabolic Panel    Collection Time: 10/19/22 10:58 AM   Result Value Ref Range    Sodium 140 133 - 143 mmol/L    Potassium 3.7 3.5 - 5.1 mmol/L    Chloride 107 101 - 110 mmol/L    CO2 28 21 - 32 mmol/L    Anion Gap 5 2 - 11 mmol/L    Glucose 113 (H) 65 - 100 mg/dL    BUN 15 6 - 23 MG/DL    Creatinine 0.50 (L) 0.6 - 1.0 MG/DL    Est, Glom Filt Rate >60 >60 ml/min/1.73m2    Calcium 9.2 8.3 - 10.4 MG/DL    Total Bilirubin 0.2 0.2 - 1.1 MG/DL    ALT 58 12 - 65 U/L    AST 27 15 - 37 U/L    Alk Phosphatase 75 50 - 136 U/L    Total Protein 7.2 6.3 - 8.2 g/dL    Albumin 3.6 3.5 - 5.0 g/dL    Globulin 3.6 2.8 - 4.5 g/dL    Albumin/Globulin Ratio 1.0 0.4 - 1.6     Magnesium    Collection Time: 10/19/22 10:58 AM   Result Value Ref Range    Magnesium 2.2 1.8 - 2.4 mg/dL       Imaging:  No results found for this or any previous visit. ASSESSMENT/PLAN:   Diagnosis Orders   1. Rectal cancer ([address]        2. Chemotherapy-induced nausea        3. Neuropathy        4. High risk medication use              49 y.o. F urgently consulted for rectal carcinoma.   She had good general baseline health, developed frequency of bowel movement and blood in stool for 2 months, colonoscopy showed low rectal adenocarcinoma and urgently consulted to oncology, discussed carcinogenesis and staging, urgent arrangement of EUS reported T3N0 disease but quite large avid disease on PET with small lymph nodes, discussed at tumor board and concern of level of local invasion, arrange rectal MRI, discussed neoadjuvant treatment options with patient and wife and desired to treat aggressively for maximal chance of CR, arrange JENNIFER in the manner of Prodige 23 (Neoadjuvant rectal cancer treatment with JENNIFER using three months of modified FOLFIRINOX (oxaliplatin 85 mg/m2, leucovorin 400 mg/m2, irinotecan 180 mg/m2 day 1, and FU 2400 mg/m2 over 46 hours every 14 days) followed by long-course CRT (50 Gy in 25 fractions plus concurrent capecitabine), TME, and three additional months of FOLFOX or capecitabine), we discussed toxicities and management, arrange port placement, antiemetics, Emla, IV iron, rectal MRI confirmed chief receive M0 disease, proceed to cycle 1 neoadjuvant FOLFIRINOX 8/11/2022, generally tolerated well, 4 bowel movements a day but not watery, there was left lower quadrant pain, nausea, discussed management accordingly, labs reviewed and CEA down, signs of clinical response but complaining of increased fatigue, increased neuropathy, thrombocytopenic, discussed the risk of chronic neuropathy and patient desired to continue and reduce oxaliplatin to 60 mg/m2 from cycle 5, return on 10/19/2022 and reporting numbness in feet persistent, proceed to cycle 6 without oxaliplatin, plan to start chemoradiation next month and coordinate with radiation oncology, discussed she has generally tolerated well with wonderful clinical response and CEA normalized, we discussed that adjuvant chemotherapy is part of the plan per protocol but will address her desire and concern after surgery, continue Compazine and Zofran for nausea control, return next month to start Xeloda radiation. Call as needed. All questions are answered to their satisfaction. They will call for further questions and concerns. ECOG PERFORMANCE STATUS - 0-Fully active, able to carry on all pre-disease performance without restriction. Pain - 0 - No pain/10. None/Minimal pain - not affecting QOL     Fatigue - No flowsheet data found.   Distress - No flowsheet data found. Elements of this note have been dictated via voice recognition software. Text and phrases may be limited by the accuracy and autoconversion of the software. The chart has been reviewed, but errors may still be present. Calin Aguilar M.D.   [address]  Office : [phone]  Fax : [phone]

## 2022-10-19 NOTE — PATIENT INSTRUCTIONS
Patient Instructions from Today's Visit    Reason for Visit:  Prechemo cycle 6 tomorrow    Diagnosis Information:  https://www.[employer]/. net/about-us/asco-answers-patient-education-materials/bimu-asdsjky-cpfu-sheets      Plan:  Prechemo-cycle 6 Folfirinox    Follow Up:   In 4 weeks at time of Xeloda/radiation start    Recent Lab Results:  Hospital Outpatient Visit on 10/19/2022   Component Date Value Ref Range Status    WBC 10/19/2022 3.4 (A)  4.3 - 11.1 K/uL Final    RBC 10/19/2022 4.29  4.05 - 5.2 M/uL Final    Hemoglobin 10/19/2022 13.4  11.7 - 15.4 g/dL Final    Hematocrit 10/19/2022 40.1  35.8 - 46.3 % Final    MCV 10/19/2022 93.5  82.0 - 102.0 FL Final    MCH 10/19/2022 31.2  26.1 - 32.9 PG Final    MCHC 10/19/2022 33.4  31.4 - 35.0 g/dL Final    RDW 10/19/2022 16.7 (A)  11.9 - 14.6 % Final    Platelets 07/17/8280 121 (A)  150 - 450 K/uL Final    MPV 10/19/2022 11.6  9.4 - 12.3 FL Final    nRBC 10/19/2022 0.00  0.0 - 0.2 K/uL Final    **Note: Absolute NRBC parameter is now reported with Hemogram**    Differential Type 10/19/2022 AUTOMATED    Final    Seg Neutrophils 10/19/2022 50  43 - 78 % Final    Lymphocytes 10/19/2022 33  13 - 44 % Final    Monocytes 10/19/2022 14 (A)  4.0 - 12.0 % Final    Eosinophils % 10/19/2022 2  0.5 - 7.8 % Final    Basophils 10/19/2022 0  0.0 - 2.0 % Final    Immature Granulocytes 10/19/2022 1  0.0 - 5.0 % Final    Segs Absolute 10/19/2022 1.7  1.7 - 8.2 K/UL Final    Absolute Lymph # 10/19/2022 1.1  0.5 - 4.6 K/UL Final    Absolute Mono # 10/19/2022 0.5  0.1 - 1.3 K/UL Final    Absolute Eos # 10/19/2022 0.1  0.0 - 0.8 K/UL Final    Basophils Absolute 10/19/2022 0.0  0.0 - 0.2 K/UL Final    Absolute Immature Granulocyte 10/19/2022 0.0  0.0 - 0.5 K/UL Final    Sodium 10/19/2022 140  133 - 143 mmol/L Final    Potassium 10/19/2022 3.7  3.5 - 5.1 mmol/L Final    Chloride 10/19/2022 107  101 - 110 mmol/L Final    CO2 10/19/2022 28  21 - 32 mmol/L Final    Anion Gap 10/19/2022 5  2 - 11 mmol/L Final    Glucose 10/19/2022 113 (A)  65 - 100 mg/dL Final    BUN 10/19/2022 15  6 - 23 MG/DL Final    Creatinine 10/19/2022 0.50 (A)  0.6 - 1.0 MG/DL Final    Est, Glom Filt Rate 10/19/2022 >60  >60 ml/min/1.73m2 Final    Comment:      Pediatric calculator link: [employer]. org/professionals/kdoqi/gfr_calculatorped       Effective Oct 3, 2022       These results are not intended for use in patients <25years of age. eGFR results are calculated without a race factor using  the 2021 CKD-EPI equation. Careful clinical correlation is recommended, particularly when comparing to results calculated using previous equations. The CKD-EPI equation is less accurate in patients with extremes of muscle mass, extra-renal metabolism of creatinine, excessive creatine ingestion, or following therapy that affects renal tubular secretion. Calcium 10/19/2022 9.2  8.3 - 10.4 MG/DL Final    Total Bilirubin 10/19/2022 0.2  0.2 - 1.1 MG/DL Final    ALT 10/19/2022 58  12 - 65 U/L Final    AST 10/19/2022 27  15 - 37 U/L Final    Alk Phosphatase 10/19/2022 75  50 - 136 U/L Final    Total Protein 10/19/2022 7.2  6.3 - 8.2 g/dL Final    Albumin 10/19/2022 3.6  3.5 - 5.0 g/dL Final    Globulin 10/19/2022 3.6  2.8 - 4.5 g/dL Final    Albumin/Globulin Ratio 10/19/2022 1.0  0.4 - 1.6   Final    Magnesium 10/19/2022 2.2  1.8 - 2.4 mg/dL Final        Treatment Summary has been discussed and given to patient: n/a        -------------------------------------------------------------------------------------------------------------------  Please call our office at [phone] if you have any  of the following symptoms:   Fever of 100.5 or greater  Chills  Shortness of breath  Swelling or pain in one leg    After office hours an answering service is available and will contact a provider for emergencies or if you are experiencing any of the above symptoms. Patient does express an interest in My Chart.   My Chart log in information explained on the after visit summary printout at the . [address] desk.     Paula Peña RN  Nurse Navigator  [address] [facility] [id number] [phone]

## 2022-10-20 ENCOUNTER — HOSPITAL ENCOUNTER (OUTPATIENT)
Dept: INFUSION THERAPY | Age: 47
Discharge: HOME | End: 2022-10-20
Payer: COMMERCIAL

## 2022-10-20 VITALS
WEIGHT: 116 LBS | BODY MASS INDEX: 21.92 KG/M2 | OXYGEN SATURATION: 97 % | DIASTOLIC BLOOD PRESSURE: 54 MMHG | HEART RATE: 86 BPM | SYSTOLIC BLOOD PRESSURE: 116 MMHG | TEMPERATURE: 98.1 F | RESPIRATION RATE: 16 BRPM

## 2022-10-20 DIAGNOSIS — C20 RECTAL CANCER (HCC): Primary | ICD-10-CM

## 2022-10-20 PROCEDURE — 6360000002 HC RX W HCPCS: Performed by: INTERNAL MEDICINE

## 2022-10-20 PROCEDURE — 96368 THER/DIAG CONCURRENT INF: CPT

## 2022-10-20 PROCEDURE — 96367 TX/PROPH/DG ADDL SEQ IV INF: CPT

## 2022-10-20 PROCEDURE — 96372 THER/PROPH/DIAG INJ SC/IM: CPT

## 2022-10-20 PROCEDURE — 2580000003 HC RX 258: Performed by: INTERNAL MEDICINE

## 2022-10-20 PROCEDURE — 96413 CHEMO IV INFUSION 1 HR: CPT

## 2022-10-20 PROCEDURE — 96375 TX/PRO/DX INJ NEW DRUG ADDON: CPT

## 2022-10-20 PROCEDURE — 96415 CHEMO IV INFUSION ADDL HR: CPT

## 2022-10-20 PROCEDURE — G0498 CHEMO EXTEND IV INFUS W/PUMP: HCPCS

## 2022-10-20 RX ORDER — SODIUM CHLORIDE 0.9 % (FLUSH) 0.9 %
5-40 SYRINGE (ML) INJECTION PRN
Status: DISCONTINUED | OUTPATIENT
Start: 2022-10-20 | End: 2022-10-21 | Stop reason: HOSPADM

## 2022-10-20 RX ORDER — DEXTROSE MONOHYDRATE 50 MG/ML
5-250 INJECTION, SOLUTION INTRAVENOUS PRN
Status: DISCONTINUED | OUTPATIENT
Start: 2022-10-20 | End: 2022-10-21 | Stop reason: HOSPADM

## 2022-10-20 RX ORDER — ATROPINE SULFATE 0.4 MG/ML
0.4 INJECTION, SOLUTION INTRAVENOUS ONCE
Status: COMPLETED | OUTPATIENT
Start: 2022-10-20 | End: 2022-10-20

## 2022-10-20 RX ORDER — ONDANSETRON 2 MG/ML
8 INJECTION INTRAMUSCULAR; INTRAVENOUS ONCE
Status: COMPLETED | OUTPATIENT
Start: 2022-10-20 | End: 2022-10-20

## 2022-10-20 RX ADMIN — FOSAPREPITANT 150 MG: 150 INJECTION, POWDER, LYOPHILIZED, FOR SOLUTION INTRAVENOUS at 09:27

## 2022-10-20 RX ADMIN — LEUCOVORIN CALCIUM 600 MG: 200 INJECTION, POWDER, LYOPHILIZED, FOR SOLUTION INTRAMUSCULAR; INTRAVENOUS at 09:55

## 2022-10-20 RX ADMIN — DEXAMETHASONE SODIUM PHOSPHATE 12 MG: 4 INJECTION, SOLUTION INTRAMUSCULAR; INTRAVENOUS at 08:59

## 2022-10-20 RX ADMIN — SODIUM CHLORIDE, PRESERVATIVE FREE 10 ML: 5 INJECTION INTRAVENOUS at 08:50

## 2022-10-20 RX ADMIN — FLUOROURACIL 3500 MG: 50 INJECTION, SOLUTION INTRAVENOUS at 11:45

## 2022-10-20 RX ADMIN — IRINOTECAN HYDROCHLORIDE 280 MG: 20 INJECTION, SOLUTION INTRAVENOUS at 09:55

## 2022-10-20 RX ADMIN — ONDANSETRON 8 MG: 2 INJECTION INTRAMUSCULAR; INTRAVENOUS at 09:23

## 2022-10-20 RX ADMIN — ATROPINE SULFATE 0.4 MG: 0.4 INJECTION, SOLUTION INTRAVENOUS at 09:51

## 2022-10-20 RX ADMIN — DEXTROSE MONOHYDRATE 25 ML/HR: 50 INJECTION, SOLUTION INTRAVENOUS at 08:59

## 2022-10-20 NOTE — PROGRESS NOTES
I saw pt on 10-19-22 with Dr Robert Harris prior to last Folfirinox infusion. Pt will have CT SIM upcoming and start Xeloda radiation. Patient already has Xeloda on hand. Dr Robert Harris spoke in [facility]) with patient. Email sent to Blake Crandall to expedite start on 11-14-22 after CT SIM.

## 2022-10-20 NOTE — PROGRESS NOTES
Arrived to the [facility]. Irinotecan, leucovorin calcium, fluoroucil completed. Patient tolerated well. Any issues or concerns during appointment: none. Patient aware of next infusion appointment on 10/22/2022 (date) at 1600 (time). Patient aware of next lab and [facility] office visit on 11/21/2022 (date) at 80 (time). Patient instructed to call provider with temperature of 100.4 or greater or nausea/vomiting/ diarrhea or pain not controlled by medications  Discharged ambulatory.

## 2022-10-22 ENCOUNTER — HOSPITAL ENCOUNTER (OUTPATIENT)
Dept: INFUSION THERAPY | Age: 47
Discharge: HOME | End: 2022-10-22
Payer: COMMERCIAL

## 2022-10-22 VITALS
HEART RATE: 98 BPM | DIASTOLIC BLOOD PRESSURE: 70 MMHG | TEMPERATURE: 98.3 F | RESPIRATION RATE: 18 BRPM | SYSTOLIC BLOOD PRESSURE: 102 MMHG

## 2022-10-22 DIAGNOSIS — C20 RECTAL CANCER (HCC): Primary | ICD-10-CM

## 2022-10-22 PROCEDURE — 2580000003 HC RX 258: Performed by: INTERNAL MEDICINE

## 2022-10-22 PROCEDURE — 96523 IRRIG DRUG DELIVERY DEVICE: CPT

## 2022-10-22 RX ORDER — SODIUM CHLORIDE 0.9 % (FLUSH) 0.9 %
5-40 SYRINGE (ML) INJECTION PRN
Status: DISCONTINUED | OUTPATIENT
Start: 2022-10-22 | End: 2022-10-23 | Stop reason: HOSPADM

## 2022-10-22 RX ADMIN — SODIUM CHLORIDE, PRESERVATIVE FREE 10 ML: 5 INJECTION INTRAVENOUS at 15:26

## 2022-10-22 NOTE — PROGRESS NOTES
Arrived to the [facility]. D/c pump completed. Provided education on same. Patient instructed to report any side affects to ordering provider. Patient tolerated well. Any issues or concerns during appointment: none. No further infusion appts needed at this time. Discharged ambulatory.

## 2022-11-03 ENCOUNTER — HOSPITAL ENCOUNTER (OUTPATIENT)
Dept: RADIATION ONCOLOGY | Age: 47
Setting detail: RECURRING SERIES
Discharge: HOME | End: 2022-11-06
Payer: COMMERCIAL

## 2022-11-03 PROCEDURE — 77334 RADIATION TREATMENT AID(S): CPT

## 2022-11-03 PROCEDURE — 77290 THER RAD SIMULAJ FIELD CPLX: CPT

## 2022-11-09 ENCOUNTER — TELEPHONE (OUTPATIENT)
Dept: ONCOLOGY | Age: 47
End: 2022-11-09

## 2022-11-09 NOTE — TELEPHONE ENCOUNTER
I have a pt that has Appt 11/14  for labs @9:20 @ .Appt @10:15 & they request to reschedule at a later time

## 2022-11-14 ENCOUNTER — OFFICE VISIT (OUTPATIENT)
Dept: ONCOLOGY | Age: 47
End: 2022-11-14
Payer: COMMERCIAL

## 2022-11-14 ENCOUNTER — HOSPITAL ENCOUNTER (OUTPATIENT)
Dept: LAB | Age: 47
Discharge: HOME | End: 2022-11-17
Payer: COMMERCIAL

## 2022-11-14 ENCOUNTER — HOSPITAL ENCOUNTER (OUTPATIENT)
Dept: RADIATION ONCOLOGY | Age: 47
Setting detail: RECURRING SERIES
End: 2022-11-14
Payer: COMMERCIAL

## 2022-11-14 VITALS
DIASTOLIC BLOOD PRESSURE: 76 MMHG | BODY MASS INDEX: 23.09 KG/M2 | TEMPERATURE: 98 F | WEIGHT: 122.3 LBS | RESPIRATION RATE: 16 BRPM | SYSTOLIC BLOOD PRESSURE: 108 MMHG | OXYGEN SATURATION: 97 % | HEART RATE: 109 BPM | HEIGHT: 61 IN

## 2022-11-14 DIAGNOSIS — C20 RECTAL CANCER (HCC): ICD-10-CM

## 2022-11-14 DIAGNOSIS — R10.32 LLQ PAIN: ICD-10-CM

## 2022-11-14 DIAGNOSIS — Z79.899 HIGH RISK MEDICATION USE: ICD-10-CM

## 2022-11-14 DIAGNOSIS — G62.9 NEUROPATHY: ICD-10-CM

## 2022-11-14 DIAGNOSIS — C20 RECTAL CANCER (HCC): Primary | ICD-10-CM

## 2022-11-14 LAB
ALBUMIN SERPL-MCNC: 3.6 G/DL (ref 3.5–5)
ALBUMIN/GLOB SERPL: 1.1 {RATIO} (ref 0.4–1.6)
ALP SERPL-CCNC: 67 U/L (ref 50–136)
ALT SERPL-CCNC: 30 U/L (ref 12–65)
ANION GAP SERPL CALC-SCNC: 5 MMOL/L (ref 2–11)
AST SERPL-CCNC: 15 U/L (ref 15–37)
BASOPHILS # BLD: 0 K/UL (ref 0–0.2)
BASOPHILS NFR BLD: 1 % (ref 0–2)
BILIRUB SERPL-MCNC: 0.4 MG/DL (ref 0.2–1.1)
BUN SERPL-MCNC: 12 MG/DL (ref 6–23)
CALCIUM SERPL-MCNC: 9.1 MG/DL (ref 8.3–10.4)
CEA SERPL-MCNC: 1.4 NG/ML (ref 0–3)
CHLORIDE SERPL-SCNC: 107 MMOL/L (ref 101–110)
CO2 SERPL-SCNC: 27 MMOL/L (ref 21–32)
CREAT SERPL-MCNC: 0.6 MG/DL (ref 0.6–1)
DIFFERENTIAL METHOD BLD: ABNORMAL
EOSINOPHIL # BLD: 0.1 K/UL (ref 0–0.8)
EOSINOPHIL NFR BLD: 2 % (ref 0.5–7.8)
ERYTHROCYTE [DISTWIDTH] IN BLOOD BY AUTOMATED COUNT: 13.2 % (ref 11.9–14.6)
GLOBULIN SER CALC-MCNC: 3.2 G/DL (ref 2.8–4.5)
GLUCOSE SERPL-MCNC: 118 MG/DL (ref 65–100)
HCT VFR BLD AUTO: 41.2 % (ref 35.8–46.3)
HGB BLD-MCNC: 13.4 G/DL (ref 11.7–15.4)
IMM GRANULOCYTES # BLD AUTO: 0.1 K/UL (ref 0–0.5)
IMM GRANULOCYTES NFR BLD AUTO: 1 % (ref 0–5)
LYMPHOCYTES # BLD: 1 K/UL (ref 0.5–4.6)
LYMPHOCYTES NFR BLD: 24 % (ref 13–44)
MAGNESIUM SERPL-MCNC: 2.1 MG/DL (ref 1.8–2.4)
MCH RBC QN AUTO: 31.5 PG (ref 26.1–32.9)
MCHC RBC AUTO-ENTMCNC: 32.5 G/DL (ref 31.4–35)
MCV RBC AUTO: 96.9 FL (ref 82–102)
MONOCYTES # BLD: 0.5 K/UL (ref 0.1–1.3)
MONOCYTES NFR BLD: 11 % (ref 4–12)
NEUTS SEG # BLD: 2.7 K/UL (ref 1.7–8.2)
NEUTS SEG NFR BLD: 61 % (ref 43–78)
NRBC # BLD: 0 K/UL (ref 0–0.2)
PLATELET # BLD AUTO: 123 K/UL (ref 150–450)
PMV BLD AUTO: 10.8 FL (ref 9.4–12.3)
POTASSIUM SERPL-SCNC: 3.7 MMOL/L (ref 3.5–5.1)
PROT SERPL-MCNC: 6.8 G/DL (ref 6.3–8.2)
RBC # BLD AUTO: 4.25 M/UL (ref 4.05–5.2)
SODIUM SERPL-SCNC: 139 MMOL/L (ref 133–143)
WBC # BLD AUTO: 4.4 K/UL (ref 4.3–11.1)

## 2022-11-14 PROCEDURE — 85025 COMPLETE CBC W/AUTO DIFF WBC: CPT

## 2022-11-14 PROCEDURE — 80053 COMPREHEN METABOLIC PANEL: CPT

## 2022-11-14 PROCEDURE — 99215 OFFICE O/P EST HI 40 MIN: CPT | Performed by: INTERNAL MEDICINE

## 2022-11-14 PROCEDURE — 83735 ASSAY OF MAGNESIUM: CPT

## 2022-11-14 PROCEDURE — 36415 COLL VENOUS BLD VENIPUNCTURE: CPT

## 2022-11-14 PROCEDURE — 82378 CARCINOEMBRYONIC ANTIGEN: CPT

## 2022-11-14 ASSESSMENT — PATIENT HEALTH QUESTIONNAIRE - PHQ9
SUM OF ALL RESPONSES TO PHQ QUESTIONS 1-9: 0
SUM OF ALL RESPONSES TO PHQ9 QUESTIONS 1 & 2: 0
1. LITTLE INTEREST OR PLEASURE IN DOING THINGS: 0
SUM OF ALL RESPONSES TO PHQ QUESTIONS 1-9: 0
SUM OF ALL RESPONSES TO PHQ QUESTIONS 1-9: 0
2. FEELING DOWN, DEPRESSED OR HOPELESS: 0
SUM OF ALL RESPONSES TO PHQ QUESTIONS 1-9: 0

## 2022-11-14 NOTE — PATIENT INSTRUCTIONS
Patient Instructions from Today's Visit    Reason for Visit:  Deidre Arringtonangely today    Diagnosis Information:  https://[email or web address]/. net/about-us/asco-answers-patient-education-materials/pleb-vashqrd-fadq-sheets      Plan:  -Start Radiation/Xeloda today  -Keep lotion on hands and feet 2-3 times a day   Nutrition and hydration  Fluid intake is very important  Fluids include - water, flavored water, juice, popsicles, italian ice, broth, milkshakes, etc.  Avoid caffeine drinks due to diuretic effect. You can have some but please limit the amount. No food restrictions. Eat what tastes good and you enjoy  Frequent small/snack size meals  If appetite decreases, use supplement shakes to help get your calories in  [place] instant breakfast mixed with 8 oz of whole milk. Their web site has recipe idea to help boost calories in shakes. This is equal to ensure or boost, but tastes better and is less expensive. Protein intake is very important also. Use protein shakes or powders.   Use plastic utensils to help decrease metallic taste in mouth     Follow Up:  As scheduled    Recent Lab Results:  Hospital Outpatient Visit on 11/14/2022   Component Date Value Ref Range Status    WBC 11/14/2022 4.4  4.3 - 11.1 K/uL Final    RBC 11/14/2022 4.25  4.05 - 5.2 M/uL Final    Hemoglobin 11/14/2022 13.4  11.7 - 15.4 g/dL Final    Hematocrit 11/14/2022 41.2  35.8 - 46.3 % Final    MCV 11/14/2022 96.9  82.0 - 102.0 FL Final    MCH 11/14/2022 31.5  26.1 - 32.9 PG Final    MCHC 11/14/2022 32.5  31.4 - 35.0 g/dL Final    RDW 11/14/2022 13.2  11.9 - 14.6 % Final    Platelets 17/71/2208 123 (A)  150 - 450 K/uL Final    MPV 11/14/2022 10.8  9.4 - 12.3 FL Final    nRBC 11/14/2022 0.00  0.0 - 0.2 K/uL Final    **Note: Absolute NRBC parameter is now reported with Hemogram**    Seg Neutrophils 11/14/2022 61  43 - 78 % Final    Lymphocytes 11/14/2022 24  13 - 44 % Final    Monocytes 11/14/2022 11  4.0 - 12.0 % Final Eosinophils % 11/14/2022 2  0.5 - 7.8 % Final    Basophils 11/14/2022 1  0.0 - 2.0 % Final    Immature Granulocytes 11/14/2022 1  0.0 - 5.0 % Final    Segs Absolute 11/14/2022 2.7  1.7 - 8.2 K/UL Final    Absolute Lymph # 11/14/2022 1.0  0.5 - 4.6 K/UL Final    Absolute Mono # 11/14/2022 0.5  0.1 - 1.3 K/UL Final    Absolute Eos # 11/14/2022 0.1  0.0 - 0.8 K/UL Final    Basophils Absolute 11/14/2022 0.0  0.0 - 0.2 K/UL Final    Absolute Immature Granulocyte 11/14/2022 0.1  0.0 - 0.5 K/UL Final    Differential Type 11/14/2022 AUTOMATED    Final    Sodium 11/14/2022 139  133 - 143 mmol/L Final    Potassium 11/14/2022 3.7  3.5 - 5.1 mmol/L Final    Chloride 11/14/2022 107  101 - 110 mmol/L Final    CO2 11/14/2022 27  21 - 32 mmol/L Final    Anion Gap 11/14/2022 5  2 - 11 mmol/L Final    Glucose 11/14/2022 118 (A)  65 - 100 mg/dL Final    BUN 11/14/2022 12  6 - 23 MG/DL Final    Creatinine 11/14/2022 0.60  0.6 - 1.0 MG/DL Final    Est, Glom Filt Rate 11/14/2022 >60  >60 ml/min/1.73m2 Final    Comment:      Pediatric calculator link: [email or web address]. org/professionals/kdoqi/gfr_calculatorped       Effective Oct 3, 2022       These results are not intended for use in patients <25years of age. eGFR results are calculated without a race factor using  the 2021 CKD-EPI equation. Careful clinical correlation is recommended, particularly when comparing to results calculated using previous equations. The CKD-EPI equation is less accurate in patients with extremes of muscle mass, extra-renal metabolism of creatinine, excessive creatine ingestion, or following therapy that affects renal tubular secretion.       Calcium 11/14/2022 9.1  8.3 - 10.4 MG/DL Final    Total Bilirubin 11/14/2022 0.4  0.2 - 1.1 MG/DL Final    ALT 11/14/2022 30  12 - 65 U/L Final    AST 11/14/2022 15  15 - 37 U/L Final    Alk Phosphatase 11/14/2022 67  50 - 136 U/L Final    Total Protein 11/14/2022 6.8  6.3 - 8.2 g/dL Final    Albumin 11/14/2022 3.6 3.5 - 5.0 g/dL Final    Globulin 11/14/2022 3.2  2.8 - 4.5 g/dL Final    Albumin/Globulin Ratio 11/14/2022 1.1  0.4 - 1.6   Final    Magnesium 11/14/2022 2.1  1.8 - 2.4 mg/dL Final    CEA 11/14/2022 1.4  0.0 - 3.0 ng/mL Final    Comment: Nonsmoker:  <3.0 ng/mL  Smoker:     <5.0 ng/mL  [facility]. Patient's results of tumor marker testing may not be comparable to labs using different manufacturers/methods. Treatment Summary has been discussed and given to patient: n/a        -------------------------------------------------------------------------------------------------------------------  Please call our office at [phone] if you have any  of the following symptoms:   Fever of 100.5 or greater  Chills  Shortness of breath  Swelling or pain in one leg    After office hours an answering service is available and will contact a provider for emergencies or if you are experiencing any of the above symptoms. Patient does express an interest in My Chart. My Chart log in information explained on the after visit summary printout at the [address] desk.     Lexii Paz, RN  Nurse Navigator  [address] [facility] [address] [phone]

## 2022-11-14 NOTE — PROGRESS NOTES
[facility] Hematology & Oncology: Office Visit Progress Note    Chief Complaint:    Rectal cancer    History of Present Illness:  Reason for Referral: Rectal cancer     Referring Provider:  Jose Guadalupe Lowe MD     Primary Care Provider: Ham Fonseca DO     Family History of Cancer/Hematologic Disorders: None noted     Presenting Symptoms: LLQ abdominal pain     Ms. Dk Bush is a 49 y.o.  female who reports to have never used tobacco products. She denies use of alcohol or drug substances. Her medical history reports as rectal bleeding. Her surgical history reports as  and colonoscopy. On 7/15/22, Ms. Dk Bush saw Dr Cathi Soto for her newly diagnosed rectal cancer. She reported to have had rectal bleeding for 2 months which prompted her first colonoscopy. A 5 cm half circumferential friable mass was identified at the most proximal valve of Singh, biopsy revealed at least carcinoma in situ. 2022 CT scan of c/a/p demonstrated no distal metastases. She reported intermittent left lower quadrant abdominal pain for years. She has EUS scheduled for August however Dr Cathi Soto reached out to Dr Matteo Riley to move up her planned EUS. A referral was placed to hematology for consideration of neoadjuvant chemoradiation. Currently there is no referral to radiation oncology. Her 2022 CBC and CMP reported WNL except for a low creatinine of 0.47.     22 Colonoscopy         2022 Surgical Pathology  DIAGNOSIS  A:  \"RECTAL MASS BIOPSIES\":        FRAGMENTS OF AT LEAST IN SITU ADENOCARCINOMA. SEE COMMENT. COMMENT: BECAUSE BIOPSY FRAGMENTS ARE LIMITED PRIMARIALY TO MUCOSA,   SUBMUCOSAL INFILTRATION CANNOT BE RULED OUT   B:  \"RANDOM COLON BIOPSIES\":        FRAGMENTS OF HISTOLOGICALLY UNREMARKABLE LARGE INTESTINE   C:  \"ASCENDING COLON POLYP\":        FRAGMENT OF MIXED TUBULOVILLOUS ADENOMA     22 CT Chest Abdomen and Pelvis  FINDINGS:  Lungs: No pulmonary nodules. No airspace disease.   Airways: Trachea and proximal bronchi grossly patent. Pleura: No effusion or thickening or calcifications. Lymph Nodes: No enlarged axillary, hilar or mediastinal lymph nodes. Heart: Normal size. Coronaries: No definite calcifications. Aorta: Normal caliber. Esophagus: Thickening of the distal esophagus  Skeletal/Chest Wall: No gross bony lesions. Liver: A 4 mm cyst anterior segment right lobe, axial image 47, otherwise  unremarkable. Gallbladder: No gallstones identified  Bile Ducts: Not dilated. Pancreas: Unremarkable. Spleen: Uniform and normal size. Stomach: Unremarkable. Bowel: There is mural thickening of the rectum, probably a cyst 6 cm in length. Margins are indistinct. Small nodular densities probably represent mesorectal  node. Adrenals: Are normal size. Kidneys/Ureters: Enhance symmetrically. No hydronephrosis. Lymph Nodes: No grossly enlarged retroperitoneal, mesenteric, or pelvic  adenopathy. Vasculature: Aorta is normal caliber. Bones: No gross bony lesions. Other: No ascites. Impression  Mural thickening of the rectum suspicious for neoplasm. Margins are  indistinct. Probable small mesorectal lymph nodes. There is one small cyst in  liver. No pulmonary nodules. PET 21282:  1. Hypermetabolic rectal mass consistent with known rectal malignancy. No   evidence of metastatic disease in the neck, chest, abdomen or pelvis. Small   perirectal lymph nodes are present but may be too small to accurately   characterize by PET imaging. No FDG activity noted in these nodes. Review of Systems:  Constitutional Fatigue. Denies fever or chills. Denies weight loss or appetite changes. Denies anorexia. HEENT Denies trauma, bluring vision, hearing loss, ear pain, nosebleeds, sore throat, neck pain and ear discharge. Skin Denies lesions or rashes. Lungs Denies shortness of breath, cough, sputum production or hemoptysis. Cardiovascular Denies chest pain, palpitations, orthopnea, claudication and leg swelling. Gastrointestinal LLQ pain. Blood in stool resolved. Nausea controlled. Denies vomiting.  Denies dysuria, frequency or hesitancy of urination   Neuro Peripheral neuropathy. Denies headaches, visual changes or ataxia. Denies dizziness, tingling, tremors,  speech change, focal weakness and headaches. Hematology Denies nasal/gum bleeding, denies easy bruise   Endo Denies heat/cold intolerance, denies diabetes. MSK Denies back pain, swollen legs, myalgias and falls. Psychiatric/Behavioral Denies depression and substance abuse. The patient is not nervous/anxious.        Allergies   Allergen Reactions    Amoxicillin Rash     Past Medical History:   Diagnosis Date    Cancer ([facility] [address]     rectal cancer-- dx 2022---- followed by dr Ce Bautista-- per spouse after scans/test  then will determine what the plan is    Rectal bleeding     onset 2022     Past Surgical History:   Procedure Laterality Date     SECTION      COLONOSCOPY      COLONOSCOPY N/A 2022    COLONOSCOPY ULTRASOUND/ BMI 19 performed by Aldo oNwak MD at [facility] ENDOSCOPY    IR PORT PLACEMENT EQUAL OR GREATER THAN 5 YEARS  2022    IR PORT PLACEMENT EQUAL OR GREATER THAN 5 YEARS 2022 [facility] RADIOLOGY SPECIALS     Family History   Problem Relation Age of Onset    High Blood Pressure Mother      Social History     Socioeconomic History    Marital status:      Spouse name: Not on file    Number of children: Not on file    Years of education: Not on file    Highest education level: Not on file   Occupational History    Not on file   Tobacco Use    Smoking status: Never    Smokeless tobacco: Never   Vaping Use    Vaping Use: Never used   Substance and Sexual Activity    Alcohol use: Yes     Comment: rare    Drug use: Never    Sexual activity: Not on file   Other Topics Concern    Not on file   Social History Narrative    Not on file     Social Determinants of Health     Financial Resource Strain: Not on file   Food Insecurity: Not on file   Transportation Needs: Not on file   Physical Activity: Not on file   Stress: Not on file   Social Connections: Not on file   Intimate Partner Violence: Not on file   Housing Stability: Not on file     Current Outpatient Medications   Medication Sig Dispense Refill    capecitabine (XELODA) 500 MG chemo tablet 1300 mg (2- 500 mg tabs and 2- 150 mg tabs) BID Monday - Friday, radiation days only. This is for first 4 weeks of radiation. 80 tablet 0    [START ON 11/28/2022] capecitabine (XELODA) 150 MG chemo tablet 1300 mg (2- 500 mg tabs and 2- 150 mg tabs) BID Monday - Friday, radiation days only. This is for last 2 weeks of radiation. 40 tablet 0    prochlorperazine (COMPAZINE) 10 MG tablet Take 1 tablet by mouth every 6 hours as needed (nausea or vomiting) 120 tablet 2    ondansetron (ZOFRAN) 4 MG tablet Take 1 tablet by mouth every 8 hours as needed for Nausea or Vomiting 90 tablet 2    lidocaine-prilocaine (EMLA) 2.5-2.5 % cream Apply topically as needed. Place small amount to port site 45 minutes prior to blood draws and/or infusion. Cover with plastic wrap 30 g 2    capecitabine (XELODA) 150 MG chemo tablet 1300 mg (2- 500 mg tabs and 2- 150 mg tabs) BID Monday - Friday, radiation days only. This is for first 4 weeks of radiation. (Patient not taking: No sig reported) 80 tablet 0    [START ON 11/28/2022] capecitabine (XELODA) 500 MG chemo tablet 1300 mg (2- 500 mg tabs and 2- 150 mg tabs) BID Monday - Friday, radiation days only. This is for last 2 weeks of radiation. (Patient not taking: No sig reported) 40 tablet 0     No current facility-administered medications for this visit.      Facility-Administered Medications Ordered in Other Visits   Medication Dose Route Frequency Provider Last Rate Last Admin    sodium chloride flush 0.9 % injection 5-40 mL  5-40 mL IntraVENous PRN Paul Kirby MD   10 mL at 09/24/22 3354       OBJECTIVE:  /76 (Site: Right Upper Arm, Position: Standing, Cuff Size: Medium Adult)   Pulse (!) 109   Temp 98 °F (36.7 °C) (Oral)   Resp 16   Ht 5' 1\" (1.549 m)   Wt 122 lb 4.8 oz (55.5 kg)   SpO2 97%   BMI 23.11 kg/m²     Physical Exam:  Constitutional: Oriented to person, place, and time. Well-developed and well-nourished. HEENT: Normocephalic and atraumatic. Oropharynx is clear and moist.   Conjunctivae and EOM are normal. Pupils are equal, round, and reactive to light. No scleral icterus. Neck supple. No JVD present. No tracheal deviation present. No thyromegaly present. Lymph node No palpable submandibular, cervical, supraclavicular, axillary and inguinal lymph nodes. Skin Warm and dry. No bruising and no rash noted. No erythema. No pallor. Respiratory Effort normal and breath sounds normal.  No respiratory distress. No wheezes. No rales. No tenderness. CVS Normal rate, regular rhythm and normal heart sounds. Exam reveals no gallop, no friction and no rub. No murmur heard. Abdomen Soft. Bowel sounds are normal. Exhibits no distension. There is no tenderness. There is no rebound and no guarding. Neuro Normal reflexes. No cranial nerve deficit. Exhibits normal muscle tone, 5 of 5 strength of all extremities. MSK Normal range of motion in general.  No edema and no tenderness.    Psych Normal mood, affect, behavior, judgment and thought content      Labs:  Recent Results (from the past 24 hour(s))   CBC with Auto Differential    Collection Time: 11/14/22  9:29 AM   Result Value Ref Range    WBC 4.4 4.3 - 11.1 K/uL    RBC 4.25 4.05 - 5.2 M/uL    Hemoglobin 13.4 11.7 - 15.4 g/dL    Hematocrit 41.2 35.8 - 46.3 %    MCV 96.9 82.0 - 102.0 FL    MCH 31.5 26.1 - 32.9 PG    MCHC 32.5 31.4 - 35.0 g/dL    RDW 13.2 11.9 - 14.6 %    Platelets 780 (L) 105 - 450 K/uL    MPV 10.8 9.4 - 12.3 FL    nRBC 0.00 0.0 - 0.2 K/uL    Seg Neutrophils 61 43 - 78 %    Lymphocytes 24 13 - 44 %    Monocytes 11 4.0 - 12.0 %    Eosinophils % 2 0.5 - 7.8 %    Basophils 1 0.0 - 2.0 %    Immature Granulocytes 1 0.0 - 5.0 %    Segs Absolute 2.7 1.7 - 8.2 K/UL    Absolute Lymph # 1.0 0.5 - 4.6 K/UL    Absolute Mono # 0.5 0.1 - 1.3 K/UL    Absolute Eos # 0.1 0.0 - 0.8 K/UL    Basophils Absolute 0.0 0.0 - 0.2 K/UL    Absolute Immature Granulocyte 0.1 0.0 - 0.5 K/UL    Differential Type AUTOMATED     Comprehensive Metabolic Panel    Collection Time: 11/14/22  9:29 AM   Result Value Ref Range    Sodium 139 133 - 143 mmol/L    Potassium 3.7 3.5 - 5.1 mmol/L    Chloride 107 101 - 110 mmol/L    CO2 27 21 - 32 mmol/L    Anion Gap 5 2 - 11 mmol/L    Glucose 118 (H) 65 - 100 mg/dL    BUN 12 6 - 23 MG/DL    Creatinine 0.60 0.6 - 1.0 MG/DL    Est, Glom Filt Rate >60 >60 ml/min/1.73m2    Calcium 9.1 8.3 - 10.4 MG/DL    Total Bilirubin 0.4 0.2 - 1.1 MG/DL    ALT 30 12 - 65 U/L    AST 15 15 - 37 U/L    Alk Phosphatase 67 50 - 136 U/L    Total Protein 6.8 6.3 - 8.2 g/dL    Albumin 3.6 3.5 - 5.0 g/dL    Globulin 3.2 2.8 - 4.5 g/dL    Albumin/Globulin Ratio 1.1 0.4 - 1.6     Magnesium    Collection Time: 11/14/22  9:29 AM   Result Value Ref Range    Magnesium 2.1 1.8 - 2.4 mg/dL   CEA    Collection Time: 11/14/22  9:29 AM   Result Value Ref Range    CEA 1.4 0.0 - 3.0 ng/mL       Imaging:  No results found for this or any previous visit. ASSESSMENT/PLAN:   Diagnosis Orders   1. Rectal cancer ([address]        2. Neuropathy        3. High risk medication use        4. LLQ pain                49 y.o. F urgently consulted for rectal carcinoma.   She had good general baseline health, developed frequency of bowel movement and blood in stool for 2 months, colonoscopy showed low rectal adenocarcinoma and urgently consulted to oncology, discussed carcinogenesis and staging, urgent arrangement of EUS reported T3N0 disease but quite large avid disease on PET with small lymph nodes, discussed at tumor board and concern of level of local invasion, arrange rectal MRI, discussed neoadjuvant treatment options with patient and wife and desired to treat aggressively for maximal chance of CR, arrange JENNIFER in the manner of [facility]d[address] (Neoadjuvant rectal cancer treatment with JENNIFER using three months of modified FOLFIRINOX (oxaliplatin 85 mg/m2, leucovorin 400 mg/m2, irinotecan 180 mg/m2 day 1, and FU 2400 mg/m2 over 46 hours every 14 days) followed by long-course CRT (50 Gy in 25 fractions plus concurrent capecitabine), TME, and three additional months of FOLFOX or capecitabine), we discussed toxicities and management, arrange port placement, antiemetics, Emla, IV iron, rectal MRI confirmed chief receive M0 disease, proceed to cycle 1 neoadjuvant FOLFIRINOX 8/11/2022, generally tolerated well, 4 bowel movements a day but not watery, there was left lower quadrant pain, nausea, discussed management accordingly, labs reviewed and CEA down, signs of clinical response but complaining of increased fatigue, increased neuropathy, thrombocytopenic, discussed the risk of chronic neuropathy and patient desired to continue and reduce oxaliplatin to 60 mg/m2 from cycle 5, return on 10/19/2022 and reporting numbness in feet persistent, received cycle 6 without oxaliplatin, plan to start chemoradiation next month and coordinate with radiation oncology, discussed she has generally tolerated well with wonderful clinical response and CEA normalized, we discussed that adjuvant chemotherapy is part of the plan per protocol but will address her desire and concern after surgery, return on 11/14/2022 labs reviewed, proceed to Xeloda radiation, feet numbness mostly recovered and continue to monitor, again discussed left lower quadrant pain developed seemed chronically fluctuating weight before cancer was diagnosed, which may be related with pelvic inflammation or pelvic endometriosis and I doubt related to cancer symptom, she had discussed with Dr. Yana Bray to explore in surgery, return next week but call as needed. All questions are answered to their satisfaction. They will call for further questions and concerns. ECOG PERFORMANCE STATUS - 0-Fully active, able to carry on all pre-disease performance without restriction. Pain - 1/10. None/Minimal pain - not affecting QOL     Fatigue - No flowsheet data found. Distress - No flowsheet data found. Elements of this note have been dictated via voice recognition software. Text and phrases may be limited by the accuracy and autoconversion of the software. The chart has been reviewed, but errors may still be present. Torey Spann M.D.   [address]  Office : [phone]  Fax : [phone]

## 2022-11-15 ENCOUNTER — APPOINTMENT (OUTPATIENT)
Dept: RADIATION ONCOLOGY | Age: 47
End: 2022-11-15
Payer: COMMERCIAL

## 2022-11-15 ENCOUNTER — HOSPITAL ENCOUNTER (OUTPATIENT)
Dept: RADIATION ONCOLOGY | Age: 47
Setting detail: RECURRING SERIES
Discharge: HOME | End: 2022-11-18
Payer: COMMERCIAL

## 2022-11-15 DIAGNOSIS — C20 RECTAL CANCER (HCC): Primary | ICD-10-CM

## 2022-11-15 PROCEDURE — 77386 HC NTSTY MODUL RAD TX DLVR CPLX: CPT

## 2022-11-16 ENCOUNTER — HOSPITAL ENCOUNTER (OUTPATIENT)
Dept: RADIATION ONCOLOGY | Age: 47
Setting detail: RECURRING SERIES
Discharge: HOME | End: 2022-11-19
Payer: COMMERCIAL

## 2022-11-16 PROCEDURE — 77386 HC NTSTY MODUL RAD TX DLVR CPLX: CPT

## 2022-11-17 ENCOUNTER — HOSPITAL ENCOUNTER (OUTPATIENT)
Dept: RADIATION ONCOLOGY | Age: 47
Setting detail: RECURRING SERIES
Discharge: HOME | End: 2022-11-20
Payer: COMMERCIAL

## 2022-11-17 PROCEDURE — 77386 HC NTSTY MODUL RAD TX DLVR CPLX: CPT

## 2022-11-18 ENCOUNTER — HOSPITAL ENCOUNTER (OUTPATIENT)
Dept: RADIATION ONCOLOGY | Age: 47
Setting detail: RECURRING SERIES
Discharge: HOME | End: 2022-11-21
Payer: COMMERCIAL

## 2022-11-18 VITALS
OXYGEN SATURATION: 95 % | SYSTOLIC BLOOD PRESSURE: 111 MMHG | HEART RATE: 80 BPM | BODY MASS INDEX: 23.41 KG/M2 | DIASTOLIC BLOOD PRESSURE: 70 MMHG | WEIGHT: 123.9 LBS | TEMPERATURE: 98.2 F

## 2022-11-18 PROCEDURE — 77386 HC NTSTY MODUL RAD TX DLVR CPLX: CPT

## 2022-11-18 ASSESSMENT — PAIN DESCRIPTION - LOCATION: LOCATION: BACK

## 2022-11-19 ENCOUNTER — TELEPHONE (OUTPATIENT)
Dept: ONCOLOGY | Age: 47
End: 2022-11-19

## 2022-11-19 NOTE — TELEPHONE ENCOUNTER
Pt's  called on the evening of 11/18/2022 reporting an episode of nausea with vomiting and abdominal pain. Advised to try prn anti-emetics and if vomiting/abdominal pain persisted to proceed to the ER for evaluation/imaging. He verbalized understanding and appreciated my call.             YAMILKA Alfaro  [facility] Hematology and Oncology  [address] [place] [address]  Office : [phone]  Fax : [phone]

## 2022-11-20 ENCOUNTER — HOSPITAL ENCOUNTER (OUTPATIENT)
Dept: RADIATION ONCOLOGY | Age: 47
Setting detail: RECURRING SERIES
Discharge: HOME | End: 2022-11-23
Payer: COMMERCIAL

## 2022-11-20 PROCEDURE — 77386 HC NTSTY MODUL RAD TX DLVR CPLX: CPT

## 2022-11-21 ENCOUNTER — OFFICE VISIT (OUTPATIENT)
Dept: ONCOLOGY | Age: 47
End: 2022-11-21
Payer: COMMERCIAL

## 2022-11-21 ENCOUNTER — HOSPITAL ENCOUNTER (OUTPATIENT)
Dept: RADIATION ONCOLOGY | Age: 47
Setting detail: RECURRING SERIES
Discharge: HOME | End: 2022-11-24
Payer: COMMERCIAL

## 2022-11-21 ENCOUNTER — HOSPITAL ENCOUNTER (OUTPATIENT)
Dept: LAB | Age: 47
Discharge: HOME | End: 2022-11-24
Payer: COMMERCIAL

## 2022-11-21 VITALS
OXYGEN SATURATION: 98 % | WEIGHT: 123.1 LBS | DIASTOLIC BLOOD PRESSURE: 69 MMHG | HEART RATE: 80 BPM | BODY MASS INDEX: 23.24 KG/M2 | TEMPERATURE: 98 F | RESPIRATION RATE: 18 BRPM | SYSTOLIC BLOOD PRESSURE: 105 MMHG | HEIGHT: 61 IN

## 2022-11-21 DIAGNOSIS — R10.13 EPIGASTRIC PAIN: ICD-10-CM

## 2022-11-21 DIAGNOSIS — R11.0 NAUSEA: ICD-10-CM

## 2022-11-21 DIAGNOSIS — C20 RECTAL CANCER (HCC): Primary | ICD-10-CM

## 2022-11-21 DIAGNOSIS — Z79.899 HIGH RISK MEDICATION USE: ICD-10-CM

## 2022-11-21 DIAGNOSIS — C20 RECTAL CANCER (HCC): ICD-10-CM

## 2022-11-21 LAB
ALBUMIN SERPL-MCNC: 3.6 G/DL (ref 3.5–5)
ALBUMIN/GLOB SERPL: 1.2 {RATIO} (ref 0.4–1.6)
ALP SERPL-CCNC: 59 U/L (ref 50–136)
ALT SERPL-CCNC: 35 U/L (ref 12–65)
ANION GAP SERPL CALC-SCNC: 4 MMOL/L (ref 2–11)
AST SERPL-CCNC: 22 U/L (ref 15–37)
BASOPHILS # BLD: 0 K/UL (ref 0–0.2)
BASOPHILS NFR BLD: 1 % (ref 0–2)
BILIRUB SERPL-MCNC: 0.6 MG/DL (ref 0.2–1.1)
BUN SERPL-MCNC: 15 MG/DL (ref 6–23)
CALCIUM SERPL-MCNC: 8.9 MG/DL (ref 8.3–10.4)
CHLORIDE SERPL-SCNC: 105 MMOL/L (ref 101–110)
CO2 SERPL-SCNC: 29 MMOL/L (ref 21–32)
CREAT SERPL-MCNC: 0.6 MG/DL (ref 0.6–1)
DIFFERENTIAL METHOD BLD: ABNORMAL
EOSINOPHIL # BLD: 0.1 K/UL (ref 0–0.8)
EOSINOPHIL NFR BLD: 2 % (ref 0.5–7.8)
ERYTHROCYTE [DISTWIDTH] IN BLOOD BY AUTOMATED COUNT: 12.2 % (ref 11.9–14.6)
GLOBULIN SER CALC-MCNC: 2.9 G/DL (ref 2.8–4.5)
GLUCOSE SERPL-MCNC: 99 MG/DL (ref 65–100)
HCT VFR BLD AUTO: 37.6 % (ref 35.8–46.3)
HGB BLD-MCNC: 12.5 G/DL (ref 11.7–15.4)
IMM GRANULOCYTES # BLD AUTO: 0 K/UL (ref 0–0.5)
IMM GRANULOCYTES NFR BLD AUTO: 1 % (ref 0–5)
LYMPHOCYTES # BLD: 0.7 K/UL (ref 0.5–4.6)
LYMPHOCYTES NFR BLD: 23 % (ref 13–44)
MCH RBC QN AUTO: 32.1 PG (ref 26.1–32.9)
MCHC RBC AUTO-ENTMCNC: 33.2 G/DL (ref 31.4–35)
MCV RBC AUTO: 96.7 FL (ref 82–102)
MONOCYTES # BLD: 0.3 K/UL (ref 0.1–1.3)
MONOCYTES NFR BLD: 9 % (ref 4–12)
NEUTS SEG # BLD: 1.9 K/UL (ref 1.7–8.2)
NEUTS SEG NFR BLD: 65 % (ref 43–78)
NRBC # BLD: 0 K/UL (ref 0–0.2)
PLATELET # BLD AUTO: 121 K/UL (ref 150–450)
PMV BLD AUTO: 10.4 FL (ref 9.4–12.3)
POTASSIUM SERPL-SCNC: 3.7 MMOL/L (ref 3.5–5.1)
PROT SERPL-MCNC: 6.5 G/DL (ref 6.3–8.2)
RBC # BLD AUTO: 3.89 M/UL (ref 4.05–5.2)
SODIUM SERPL-SCNC: 138 MMOL/L (ref 133–143)
WBC # BLD AUTO: 3 K/UL (ref 4.3–11.1)

## 2022-11-21 PROCEDURE — 99215 OFFICE O/P EST HI 40 MIN: CPT | Performed by: INTERNAL MEDICINE

## 2022-11-21 PROCEDURE — 77336 RADIATION PHYSICS CONSULT: CPT

## 2022-11-21 PROCEDURE — 36415 COLL VENOUS BLD VENIPUNCTURE: CPT

## 2022-11-21 PROCEDURE — 77386 HC NTSTY MODUL RAD TX DLVR CPLX: CPT

## 2022-11-21 PROCEDURE — 80053 COMPREHEN METABOLIC PANEL: CPT

## 2022-11-21 PROCEDURE — 85025 COMPLETE CBC W/AUTO DIFF WBC: CPT

## 2022-11-21 ASSESSMENT — PATIENT HEALTH QUESTIONNAIRE - PHQ9
SUM OF ALL RESPONSES TO PHQ QUESTIONS 1-9: 0
SUM OF ALL RESPONSES TO PHQ QUESTIONS 1-9: 0
SUM OF ALL RESPONSES TO PHQ9 QUESTIONS 1 & 2: 0
1. LITTLE INTEREST OR PLEASURE IN DOING THINGS: 0
2. FEELING DOWN, DEPRESSED OR HOPELESS: 0
SUM OF ALL RESPONSES TO PHQ QUESTIONS 1-9: 0
SUM OF ALL RESPONSES TO PHQ QUESTIONS 1-9: 0

## 2022-11-21 NOTE — PATIENT INSTRUCTIONS
Patient Instructions from Today's Visit    Reason for Visit:  Prechemo radiation/Xeloda    Diagnosis Information:  https://www.[employer]/. net/about-us/asco-answers-patient-education-materials/cacf-csyhkze-mwru-sheets                        Plan:  -You may take Pericolace for constipation once a day or twice a day. This is over the counter medication  -You may also use Miralax over the counter    Follow Up:   In one week    Recent Lab Results:  Hospital Outpatient Visit on 11/21/2022   Component Date Value Ref Range Status    WBC 11/21/2022 3.0 (A)  4.3 - 11.1 K/uL Final    RBC 11/21/2022 3.89 (A)  4.05 - 5.2 M/uL Final    Hemoglobin 11/21/2022 12.5  11.7 - 15.4 g/dL Final    Hematocrit 11/21/2022 37.6  35.8 - 46.3 % Final    MCV 11/21/2022 96.7  82.0 - 102.0 FL Final    MCH 11/21/2022 32.1  26.1 - 32.9 PG Final    MCHC 11/21/2022 33.2  31.4 - 35.0 g/dL Final    RDW 11/21/2022 12.2  11.9 - 14.6 % Final    Platelets 82/50/7562 121 (A)  150 - 450 K/uL Final    MPV 11/21/2022 10.4  9.4 - 12.3 FL Final    nRBC 11/21/2022 0.00  0.0 - 0.2 K/uL Final    **Note: Absolute NRBC parameter is now reported with Hemogram**    Differential Type 11/21/2022 AUTOMATED    Final    Seg Neutrophils 11/21/2022 65  43 - 78 % Final    Lymphocytes 11/21/2022 23  13 - 44 % Final    Monocytes 11/21/2022 9  4.0 - 12.0 % Final    Eosinophils % 11/21/2022 2  0.5 - 7.8 % Final    Basophils 11/21/2022 1  0.0 - 2.0 % Final    Immature Granulocytes 11/21/2022 1  0.0 - 5.0 % Final    Segs Absolute 11/21/2022 1.9  1.7 - 8.2 K/UL Final    Absolute Lymph # 11/21/2022 0.7  0.5 - 4.6 K/UL Final    Absolute Mono # 11/21/2022 0.3  0.1 - 1.3 K/UL Final    Absolute Eos # 11/21/2022 0.1  0.0 - 0.8 K/UL Final    Basophils Absolute 11/21/2022 0.0  0.0 - 0.2 K/UL Final    Absolute Immature Granulocyte 11/21/2022 0.0  0.0 - 0.5 K/UL Final    Sodium 11/21/2022 138  133 - 143 mmol/L Final    Potassium 11/21/2022 3.7  3.5 - 5.1 mmol/L Final    Chloride 11/21/2022 105 101 - 110 mmol/L Final    CO2 11/21/2022 29  21 - 32 mmol/L Final    Anion Gap 11/21/2022 4  2 - 11 mmol/L Final    Glucose 11/21/2022 99  65 - 100 mg/dL Final    BUN 11/21/2022 15  6 - 23 MG/DL Final    Creatinine 11/21/2022 0.60  0.6 - 1.0 MG/DL Final    Est, Glom Filt Rate 11/21/2022 >60  >60 ml/min/1.73m2 Final    Comment:      Pediatric calculator link: Tres.at. org/professionals/kdoqi/gfr_calculatorped       Effective Oct 3, 2022       These results are not intended for use in patients <25years of age. eGFR results are calculated without a race factor using  the 2021 CKD-EPI equation. Careful clinical correlation is recommended, particularly when comparing to results calculated using previous equations. The CKD-EPI equation is less accurate in patients with extremes of muscle mass, extra-renal metabolism of creatinine, excessive creatine ingestion, or following therapy that affects renal tubular secretion. Calcium 11/21/2022 8.9  8.3 - 10.4 MG/DL Final    Total Bilirubin 11/21/2022 0.6  0.2 - 1.1 MG/DL Final    ALT 11/21/2022 35  12 - 65 U/L Final    AST 11/21/2022 22  15 - 37 U/L Final    Alk Phosphatase 11/21/2022 59  50 - 136 U/L Final    Total Protein 11/21/2022 6.5  6.3 - 8.2 g/dL Final    Albumin 11/21/2022 3.6  3.5 - 5.0 g/dL Final    Globulin 11/21/2022 2.9  2.8 - 4.5 g/dL Final    Albumin/Globulin Ratio 11/21/2022 1.2  0.4 - 1.6   Final        Treatment Summary has been discussed and given to patient: n/a        -------------------------------------------------------------------------------------------------------------------  Please call our office at [phone] if you have any  of the following symptoms:   Fever of 100.5 or greater  Chills  Shortness of breath  Swelling or pain in one leg    After office hours an answering service is available and will contact a provider for emergencies or if you are experiencing any of the above symptoms.     Patient does express an interest in My Chart. My Chart log in information explained on the after visit summary printout at the [facility] desk.   Anusha Langford RN  Nurse Navigator  [address] [place] [phone]

## 2022-11-21 NOTE — PROGRESS NOTES
[facility]: Office Visit Progress Note    Chief Complaint:    Rectal cancer    History of Present Illness:  Reason for Referral: Rectal cancer     Referring Provider:  Charlene Landaverde MD     Primary Care Provider: David Flores DO     Family History of Cancer/Hematologic Disorders: None noted     Presenting Symptoms: LLQ abdominal pain     Ms. Chaparro Ricardo is a 49 y.o.  female who reports to have never used tobacco products. She denies use of alcohol or drug substances. Her medical history reports as rectal bleeding. Her surgical history reports as  and colonoscopy. On 7/15/22, Ms. Chaparro Ricardo saw Dr Lamin Salazar for her newly diagnosed rectal cancer. She reported to have had rectal bleeding for 2 months which prompted her first colonoscopy. A 5 cm half circumferential friable mass was identified at the most proximal valve of Singh, biopsy revealed at least carcinoma in situ. 2022 CT scan of c/a/p demonstrated no distal metastases. She reported intermittent left lower quadrant abdominal pain for years. She has EUS scheduled for August however Dr Lamin Salazar reached out to Dr Lyndsey Mixon to move up her planned EUS. A referral was placed to hematology for consideration of neoadjuvant chemoradiation. Currently there is no referral to radiation oncology. Her 2022 CBC and CMP reported WNL except for a low creatinine of 0.47.     22 Colonoscopy         2022 Surgical Pathology  DIAGNOSIS  A:  \"RECTAL MASS BIOPSIES\":        FRAGMENTS OF AT LEAST IN SITU ADENOCARCINOMA. SEE COMMENT. COMMENT: BECAUSE BIOPSY FRAGMENTS ARE LIMITED PRIMARIALY TO MUCOSA,   SUBMUCOSAL INFILTRATION CANNOT BE RULED OUT   B:  \"RANDOM COLON BIOPSIES\":        FRAGMENTS OF HISTOLOGICALLY UNREMARKABLE LARGE INTESTINE   C:  \"ASCENDING COLON POLYP\":        FRAGMENT OF MIXED TUBULOVILLOUS ADENOMA     22 CT Chest Abdomen and Pelvis  FINDINGS:  Lungs: No pulmonary nodules. No airspace disease.   Airways: Trachea and proximal bronchi grossly patent. Pleura: No effusion or thickening or calcifications. Lymph Nodes: No enlarged axillary, hilar or mediastinal lymph nodes. Heart: Normal size. Coronaries: No definite calcifications. Aorta: Normal caliber. Esophagus: Thickening of the distal esophagus  Skeletal/Chest Wall: No gross bony lesions. Liver: A 4 mm cyst anterior segment right lobe, axial image 47, otherwise  unremarkable. Gallbladder: No gallstones identified  Bile Ducts: Not dilated. Pancreas: Unremarkable. Spleen: Uniform and normal size. Stomach: Unremarkable. Bowel: There is mural thickening of the rectum, probably a cyst 6 cm in length. Margins are indistinct. Small nodular densities probably represent mesorectal  node. Adrenals: Are normal size. Kidneys/Ureters: Enhance symmetrically. No hydronephrosis. Lymph Nodes: No grossly enlarged retroperitoneal, mesenteric, or pelvic  adenopathy. Vasculature: Aorta is normal caliber. Bones: No gross bony lesions. Other: No ascites. Impression  Mural thickening of the rectum suspicious for neoplasm. Margins are  indistinct. Probable small mesorectal lymph nodes. There is one small cyst in  liver. No pulmonary nodules. PET 15543:  1. Hypermetabolic rectal mass consistent with known rectal malignancy. No   evidence of metastatic disease in the neck, chest, abdomen or pelvis. Small   perirectal lymph nodes are present but may be too small to accurately   characterize by PET imaging. No FDG activity noted in these nodes. Review of Systems:  Constitutional Fatigue. Denies fever or chills. Denies weight loss or appetite changes. Denies anorexia. HEENT Denies trauma, bluring vision, hearing loss, ear pain, nosebleeds, sore throat, neck pain and ear discharge. Skin Denies lesions or rashes. Lungs Denies shortness of breath, cough, sputum production or hemoptysis. Cardiovascular Denies chest pain, palpitations, orthopnea, claudication and leg swelling. Gastrointestinal LLQ pain. Blood in stool resolved. Nausea controlled. Denies vomiting.  Denies dysuria, frequency or hesitancy of urination   Neuro Peripheral neuropathy. Denies headaches, visual changes or ataxia. Denies dizziness, tingling, tremors,  speech change, focal weakness and headaches. Hematology Denies nasal/gum bleeding, denies easy bruise   Endo Denies heat/cold intolerance, denies diabetes. MSK Denies back pain, swollen legs, myalgias and falls. Psychiatric/Behavioral Denies depression and substance abuse. The patient is not nervous/anxious.        Allergies   Allergen Reactions    Amoxicillin Rash     Past Medical History:   Diagnosis Date    Cancer ([facility] [address]     rectal cancer-- dx 2022---- followed by dr Linda Plummer-- per spouse after scans/test  then will determine what the plan is    Rectal bleeding     onset 2022     Past Surgical History:   Procedure Laterality Date     SECTION      COLONOSCOPY      COLONOSCOPY N/A 2022    COLONOSCOPY ULTRASOUND/ BMI 19 performed by Jasbir Roth MD at [facility] ENDOSCOPY    IR PORT PLACEMENT EQUAL OR GREATER THAN 5 YEARS  2022    IR PORT PLACEMENT EQUAL OR GREATER THAN 5 YEARS 2022 [facility] SPE[facility]     Family History   Problem Relation Age of Onset    High Blood Pressure Mother      Social History     Socioeconomic History    Marital status:      Spouse name: Not on file    Number of children: Not on file    Years of education: Not on file    Highest education level: Not on file   Occupational History    Not on file   Tobacco Use    Smoking status: Never    Smokeless tobacco: Never   Vaping Use    Vaping Use: Never used   Substance and Sexual Activity    Alcohol use: Yes     Comment: rare    Drug use: Never    Sexual activity: Not on file   Other Topics Concern    Not on file   Social History Narrative    Not on file     Social Determinants of Health     Financial Resource Strain: Not on file   Food Insecurity: Not on file   Transportation Needs: Not on file   Physical Activity: Not on file   Stress: Not on file   Social Connections: Not on file   Intimate Partner Violence: Not on file   Housing Stability: Not on file     Current Outpatient Medications   Medication Sig Dispense Refill    capecitabine (XELODA) 500 MG chemo tablet 1300 mg (2- 500 mg tabs and 2- 150 mg tabs) BID Monday - Friday, radiation days only. This is for first 4 weeks of radiation. 80 tablet 0    [START ON 11/28/2022] capecitabine (XELODA) 150 MG chemo tablet 1300 mg (2- 500 mg tabs and 2- 150 mg tabs) BID Monday - Friday, radiation days only. This is for last 2 weeks of radiation. 40 tablet 0    prochlorperazine (COMPAZINE) 10 MG tablet Take 1 tablet by mouth every 6 hours as needed (nausea or vomiting) 120 tablet 2    ondansetron (ZOFRAN) 4 MG tablet Take 1 tablet by mouth every 8 hours as needed for Nausea or Vomiting 90 tablet 2    lidocaine-prilocaine (EMLA) 2.5-2.5 % cream Apply topically as needed. Place small amount to port site 45 minutes prior to blood draws and/or infusion. Cover with plastic wrap 30 g 2    capecitabine (XELODA) 150 MG chemo tablet 1300 mg (2- 500 mg tabs and 2- 150 mg tabs) BID Monday - Friday, radiation days only. This is for first 4 weeks of radiation. (Patient not taking: No sig reported) 80 tablet 0    [START ON 11/28/2022] capecitabine (XELODA) 500 MG chemo tablet 1300 mg (2- 500 mg tabs and 2- 150 mg tabs) BID Monday - Friday, radiation days only. This is for last 2 weeks of radiation. (Patient not taking: No sig reported) 40 tablet 0     No current facility-administered medications for this visit.      Facility-Administered Medications Ordered in Other Visits   Medication Dose Route Frequency Provider Last Rate Last Admin    sodium chloride flush 0.9 % injection 5-40 mL  5-40 mL IntraVENous PRN Kary Ya MD   10 mL at 09/24/22 9694       OBJECTIVE:  /69 (Site: Right Upper Arm, Position: Standing)   Pulse 80   Temp 98 °F (36.7 °C) (Oral)   Resp 18   Ht 5' 1\" (1.549 m)   Wt 123 lb 1.6 oz (55.8 kg)   SpO2 98%   BMI 23.26 kg/m²     Physical Exam:  Constitutional: Oriented to person, place, and time. Well-developed and well-nourished. HEENT: Normocephalic and atraumatic. Oropharynx is clear and moist.   Conjunctivae and EOM are normal. Pupils are equal, round, and reactive to light. No scleral icterus. Neck supple. No JVD present. No tracheal deviation present. No thyromegaly present. Lymph node No palpable submandibular, cervical, supraclavicular, axillary and inguinal lymph nodes. Skin Warm and dry. No bruising and no rash noted. No erythema. No pallor. Respiratory Effort normal and breath sounds normal.  No respiratory distress. No wheezes. No rales. No tenderness. CVS Normal rate, regular rhythm and normal heart sounds. Exam reveals no gallop, no friction and no rub. No murmur heard. Abdomen Soft. Bowel sounds are normal. Exhibits no distension. There is no tenderness. There is no rebound and no guarding. Neuro Normal reflexes. No cranial nerve deficit. Exhibits normal muscle tone, 5 of 5 strength of all extremities. MSK Normal range of motion in general.  No edema and no tenderness.    Psych Normal mood, affect, behavior, judgment and thought content      Labs:  Recent Results (from the past 24 hour(s))   CBC with Auto Differential    Collection Time: 11/21/22  8:40 AM   Result Value Ref Range    WBC 3.0 (L) 4.3 - 11.1 K/uL    RBC 3.89 (L) 4.05 - 5.2 M/uL    Hemoglobin 12.5 11.7 - 15.4 g/dL    Hematocrit 37.6 35.8 - 46.3 %    MCV 96.7 82.0 - 102.0 FL    MCH 32.1 26.1 - 32.9 PG    MCHC 33.2 31.4 - 35.0 g/dL    RDW 12.2 11.9 - 14.6 %    Platelets 187 (L) 619 - 450 K/uL    MPV 10.4 9.4 - 12.3 FL    nRBC 0.00 0.0 - 0.2 K/uL    Differential Type AUTOMATED      Seg Neutrophils 65 43 - 78 %    Lymphocytes 23 13 - 44 %    Monocytes 9 4.0 - 12.0 %    Eosinophils % 2 0.5 - 7.8 % Basophils 1 0.0 - 2.0 %    Immature Granulocytes 1 0.0 - 5.0 %    Segs Absolute 1.9 1.7 - 8.2 K/UL    Absolute Lymph # 0.7 0.5 - 4.6 K/UL    Absolute Mono # 0.3 0.1 - 1.3 K/UL    Absolute Eos # 0.1 0.0 - 0.8 K/UL    Basophils Absolute 0.0 0.0 - 0.2 K/UL    Absolute Immature Granulocyte 0.0 0.0 - 0.5 K/UL   Comprehensive Metabolic Panel    Collection Time: 11/21/22  8:40 AM   Result Value Ref Range    Sodium 138 133 - 143 mmol/L    Potassium 3.7 3.5 - 5.1 mmol/L    Chloride 105 101 - 110 mmol/L    CO2 29 21 - 32 mmol/L    Anion Gap 4 2 - 11 mmol/L    Glucose 99 65 - 100 mg/dL    BUN 15 6 - 23 MG/DL    Creatinine 0.60 0.6 - 1.0 MG/DL    Est, Glom Filt Rate >60 >60 ml/min/1.73m2    Calcium 8.9 8.3 - 10.4 MG/DL    Total Bilirubin 0.6 0.2 - 1.1 MG/DL    ALT 35 12 - 65 U/L    AST 22 15 - 37 U/L    Alk Phosphatase 59 50 - 136 U/L    Total Protein 6.5 6.3 - 8.2 g/dL    Albumin 3.6 3.5 - 5.0 g/dL    Globulin 2.9 2.8 - 4.5 g/dL    Albumin/Globulin Ratio 1.2 0.4 - 1.6         Imaging:  No results found for this or any previous visit. ASSESSMENT/PLAN:   Diagnosis Orders   1. Rectal cancer ([address]        2. High risk medication use        3. Epigastric pain        4. Nausea                  49 y.o. F urgently consulted for rectal carcinoma.   She had good general baseline health, developed frequency of bowel movement and blood in stool for 2 months, colonoscopy showed low rectal adenocarcinoma and urgently consulted to oncology, discussed carcinogenesis and staging, urgent arrangement of EUS reported T3N0 disease but quite large avid disease on PET with small lymph nodes, discussed at tumor board and concern of level of local invasion, arrange rectal MRI, discussed neoadjuvant treatment options with patient and wife and desired to treat aggressively for maximal chance of CR, arrange JENNIFER in the manner of [address] (Neoadjuvant rectal cancer treatment with JENNIFER using three months of modified FOLFIRINOX (oxaliplatin 85 mg/m2, leucovorin 400 mg/m2, irinotecan 180 mg/m2 day 1, and FU 2400 mg/m2 over 46 hours every 14 days) followed by long-course CRT (50 Gy in 25 fractions plus concurrent capecitabine), TME, and three additional months of FOLFOX or capecitabine), we discussed toxicities and management, arrange port placement, antiemetics, Emla, IV iron, rectal MRI confirmed chief receive M0 disease, proceed to cycle 1 neoadjuvant FOLFIRINOX 8/11/2022, generally tolerated well, 4 bowel movements a day but not watery, there was left lower quadrant pain, nausea, discussed management accordingly, labs reviewed and CEA down, signs of clinical response but complaining of increased fatigue, increased neuropathy, thrombocytopenic, discussed the risk of chronic neuropathy and patient desired to continue and reduce oxaliplatin to 60 mg/m2 from cycle 5, return on 10/19/2022 and reporting numbness in feet persistent, received cycle 6 without oxaliplatin, plan to start chemoradiation next month and coordinate with radiation oncology, discussed she has generally tolerated well with wonderful clinical response and CEA normalized, we discussed that adjuvant chemotherapy is part of the plan per protocol but will address her desire and concern after surgery, return on 11/14/2022 labs reviewed, proceed to Xeloda radiation, feet numbness mostly recovered and continue to monitor, again discussed left lower quadrant pain developed seemed chronically fluctuating before cancer was diagnosed, which may be related with pelvic inflammation or pelvic endometriosis and I doubt related to cancer symptom, she will discuss with Dr. Lamin Salazar to explore in surgery, return on 11/21/2022 and reports 1 day of epigastric pain and nausea responded to Tums, continue Zofran as needed, Mary Ann-Colace for constipation, labs reviewed and proceed to week 2 Xeloda radiation, follow next week but call as needed. All questions are answered to their satisfaction.  They will call for further questions and concerns. ECOG PERFORMANCE STATUS - 0-Fully active, able to carry on all pre-disease performance without restriction. Pain - 0 - No pain/10. None/Minimal pain - not affecting QOL     Fatigue - No flowsheet data found. Distress - No flowsheet data found. Elements of this note have been dictated via voice recognition software. Text and phrases may be limited by the accuracy and autoconversion of the software. The chart has been reviewed, but errors may still be present. J Carlos Del Toro M.D.   [address]  Office : [phone]  Fax : [phone]

## 2022-11-22 ENCOUNTER — HOSPITAL ENCOUNTER (OUTPATIENT)
Dept: RADIATION ONCOLOGY | Age: 47
Setting detail: RECURRING SERIES
Discharge: HOME | End: 2022-11-25
Payer: COMMERCIAL

## 2022-11-22 PROCEDURE — 77386 HC NTSTY MODUL RAD TX DLVR CPLX: CPT

## 2022-11-23 ENCOUNTER — HOSPITAL ENCOUNTER (OUTPATIENT)
Dept: RADIATION ONCOLOGY | Age: 47
Setting detail: RECURRING SERIES
Discharge: HOME | End: 2022-11-26
Payer: COMMERCIAL

## 2022-11-23 VITALS
SYSTOLIC BLOOD PRESSURE: 110 MMHG | OXYGEN SATURATION: 97 % | HEART RATE: 78 BPM | DIASTOLIC BLOOD PRESSURE: 73 MMHG | BODY MASS INDEX: 23.56 KG/M2 | WEIGHT: 124.7 LBS | TEMPERATURE: 98.1 F

## 2022-11-23 PROCEDURE — 77386 HC NTSTY MODUL RAD TX DLVR CPLX: CPT

## 2022-11-23 NOTE — PROGRESS NOTES
RADIATION ONCOLOGY ON-TREATMENT VISIT  11/23/22    Visit today was conducted in [address] [place] [address] using video . Diagnosis:  Cancer Staging  Rectal cancer [facility])  Staging form: Colon And Rectum, AJCC 8th Edition  - Clinical: Stage IIA (cT3, cN0, cM0) - Signed by Tabitha Gonzalez MD on 10/12/2022    This is a 49 y.o. female who is currently receiving chemoRT as part of JENNIFER. Current RT dose: 14.4/45 Gy in 8/25 fractions. Boost of 5.4 Gy in 3 fx to follow. Concurrent systemic therapy:  Xeloda    Subjective:  She is doing well. No nausea/vomiting, diarrhea, or abdominal pain. Takes Zofran once in the mornings. Low back pain is intermittent, not bothersome. She has not noticed any skin changes yet. Objective:  Vitals:    11/23/22 0835   BP: 110/73   Pulse: 78   Temp: 98.1 °F (36.7 °C)   TempSrc: Oral   SpO2: 97%   Weight: 124 lb 11.2 oz (56.6 kg)     Pain 0/10    General: Alert and conversant, in NAD  Abdomen: Non-distended    Assessment:  Patient is tolerating radiation therapy well. Plan:  -Low back soreness: better, likely musculoskeletal, can take Tylenol prn.   -Nausea: mild, continue Zofran prn.   -Continue RT as planned. -Treatment images reviewed. -Concurrent Xeloda as per Dr. Ramon Treadwell. Labs reviewed, mild thrombocytopenia, can continue RT. -The patient has a documented plan of care to address pain. As above.        Tabitha Gonzalez MD  11/23/22

## 2022-11-27 ENCOUNTER — APPOINTMENT (OUTPATIENT)
Dept: RADIATION ONCOLOGY | Age: 47
End: 2022-11-27
Payer: COMMERCIAL

## 2022-11-28 ENCOUNTER — HOSPITAL ENCOUNTER (OUTPATIENT)
Dept: RADIATION ONCOLOGY | Age: 47
Setting detail: RECURRING SERIES
Discharge: HOME | End: 2022-12-01
Payer: COMMERCIAL

## 2022-11-29 ENCOUNTER — HOSPITAL ENCOUNTER (OUTPATIENT)
Dept: RADIATION ONCOLOGY | Age: 47
Setting detail: RECURRING SERIES
Discharge: HOME | End: 2022-12-02
Payer: COMMERCIAL

## 2022-11-29 ENCOUNTER — OFFICE VISIT (OUTPATIENT)
Dept: ONCOLOGY | Age: 47
End: 2022-11-29
Payer: COMMERCIAL

## 2022-11-29 ENCOUNTER — HOSPITAL ENCOUNTER (OUTPATIENT)
Dept: LAB | Age: 47
Discharge: HOME | End: 2022-12-02
Payer: COMMERCIAL

## 2022-11-29 VITALS
HEIGHT: 61 IN | DIASTOLIC BLOOD PRESSURE: 64 MMHG | HEART RATE: 88 BPM | WEIGHT: 121 LBS | OXYGEN SATURATION: 96 % | SYSTOLIC BLOOD PRESSURE: 112 MMHG | RESPIRATION RATE: 22 BRPM | TEMPERATURE: 98.7 F | BODY MASS INDEX: 22.84 KG/M2

## 2022-11-29 DIAGNOSIS — R19.7 DIARRHEA, UNSPECIFIED TYPE: ICD-10-CM

## 2022-11-29 DIAGNOSIS — K62.89 RECTAL DISCOMFORT: ICD-10-CM

## 2022-11-29 DIAGNOSIS — C20 RECTAL CANCER (HCC): Primary | ICD-10-CM

## 2022-11-29 DIAGNOSIS — C20 RECTAL CANCER (HCC): ICD-10-CM

## 2022-11-29 DIAGNOSIS — Z79.899 HIGH RISK MEDICATION USE: ICD-10-CM

## 2022-11-29 LAB
ALBUMIN SERPL-MCNC: 3.7 G/DL (ref 3.5–5)
ALBUMIN/GLOB SERPL: 1.2 {RATIO} (ref 0.4–1.6)
ALP SERPL-CCNC: 60 U/L (ref 50–136)
ALT SERPL-CCNC: 23 U/L (ref 12–65)
ANION GAP SERPL CALC-SCNC: 8 MMOL/L (ref 2–11)
AST SERPL-CCNC: 14 U/L (ref 15–37)
BASOPHILS # BLD: 0 K/UL (ref 0–0.2)
BASOPHILS NFR BLD: 0 % (ref 0–2)
BILIRUB SERPL-MCNC: 0.5 MG/DL (ref 0.2–1.1)
BUN SERPL-MCNC: 14 MG/DL (ref 6–23)
CALCIUM SERPL-MCNC: 9 MG/DL (ref 8.3–10.4)
CHLORIDE SERPL-SCNC: 106 MMOL/L (ref 101–110)
CO2 SERPL-SCNC: 26 MMOL/L (ref 21–32)
CREAT SERPL-MCNC: 0.6 MG/DL (ref 0.6–1)
DIFFERENTIAL METHOD BLD: ABNORMAL
EOSINOPHIL # BLD: 0.1 K/UL (ref 0–0.8)
EOSINOPHIL NFR BLD: 3 % (ref 0.5–7.8)
ERYTHROCYTE [DISTWIDTH] IN BLOOD BY AUTOMATED COUNT: 12.9 % (ref 11.9–14.6)
GLOBULIN SER CALC-MCNC: 3.1 G/DL (ref 2.8–4.5)
GLUCOSE SERPL-MCNC: 126 MG/DL (ref 65–100)
HCT VFR BLD AUTO: 37.2 % (ref 35.8–46.3)
HGB BLD-MCNC: 12.6 G/DL (ref 11.7–15.4)
IMM GRANULOCYTES # BLD AUTO: 0 K/UL (ref 0–0.5)
IMM GRANULOCYTES NFR BLD AUTO: 1 % (ref 0–5)
LYMPHOCYTES # BLD: 0.6 K/UL (ref 0.5–4.6)
LYMPHOCYTES NFR BLD: 22 % (ref 13–44)
MCH RBC QN AUTO: 32.7 PG (ref 26.1–32.9)
MCHC RBC AUTO-ENTMCNC: 33.9 G/DL (ref 31.4–35)
MCV RBC AUTO: 96.6 FL (ref 82–102)
MONOCYTES # BLD: 0.4 K/UL (ref 0.1–1.3)
MONOCYTES NFR BLD: 12 % (ref 4–12)
NEUTS SEG # BLD: 1.8 K/UL (ref 1.7–8.2)
NEUTS SEG NFR BLD: 62 % (ref 43–78)
NRBC # BLD: 0 K/UL (ref 0–0.2)
PLATELET # BLD AUTO: 89 K/UL (ref 150–450)
PMV BLD AUTO: 10.4 FL (ref 9.4–12.3)
POTASSIUM SERPL-SCNC: 3.7 MMOL/L (ref 3.5–5.1)
PROT SERPL-MCNC: 6.8 G/DL (ref 6.3–8.2)
RBC # BLD AUTO: 3.85 M/UL (ref 4.05–5.2)
SODIUM SERPL-SCNC: 140 MMOL/L (ref 133–143)
WBC # BLD AUTO: 2.9 K/UL (ref 4.3–11.1)

## 2022-11-29 PROCEDURE — 80053 COMPREHEN METABOLIC PANEL: CPT

## 2022-11-29 PROCEDURE — 85025 COMPLETE CBC W/AUTO DIFF WBC: CPT

## 2022-11-29 PROCEDURE — 99214 OFFICE O/P EST MOD 30 MIN: CPT | Performed by: NURSE PRACTITIONER

## 2022-11-29 PROCEDURE — 36415 COLL VENOUS BLD VENIPUNCTURE: CPT

## 2022-11-29 PROCEDURE — 77386 HC NTSTY MODUL RAD TX DLVR CPLX: CPT

## 2022-11-29 ASSESSMENT — PATIENT HEALTH QUESTIONNAIRE - PHQ9
SUM OF ALL RESPONSES TO PHQ QUESTIONS 1-9: 0
2. FEELING DOWN, DEPRESSED OR HOPELESS: 0
SUM OF ALL RESPONSES TO PHQ9 QUESTIONS 1 & 2: 0
1. LITTLE INTEREST OR PLEASURE IN DOING THINGS: 0

## 2022-11-29 NOTE — PATIENT INSTRUCTIONS
Patient Instructions from Today's Visit    Reason for Visit:  Prechemo Xeloda/Radiation    Diagnosis Information:  https://www.[employer]/. net/about-us/asco-answers-patient-education-materials/qjty-ymxegrv-zhyf-sheets      Plan:  -Continue Xeloda/radiation    Follow Up:  -in one week    Recent Lab Results:  Hospital Outpatient Visit on 11/29/2022   Component Date Value Ref Range Status    WBC 11/29/2022 2.9 (A)  4.3 - 11.1 K/uL Final    RBC 11/29/2022 3.85 (A)  4.05 - 5.2 M/uL Final    Hemoglobin 11/29/2022 12.6  11.7 - 15.4 g/dL Final    Hematocrit 11/29/2022 37.2  35.8 - 46.3 % Final    MCV 11/29/2022 96.6  82.0 - 102.0 FL Final    MCH 11/29/2022 32.7  26.1 - 32.9 PG Final    MCHC 11/29/2022 33.9  31.4 - 35.0 g/dL Final    RDW 11/29/2022 12.9  11.9 - 14.6 % Final    Platelets 34/15/9436 89 (A)  150 - 450 K/uL Final    MPV 11/29/2022 10.4  9.4 - 12.3 FL Final    nRBC 11/29/2022 0.00  0.0 - 0.2 K/uL Final    **Note: Absolute NRBC parameter is now reported with Hemogram**    Differential Type 11/29/2022 AUTOMATED    Final    Seg Neutrophils 11/29/2022 62  43 - 78 % Final    Lymphocytes 11/29/2022 22  13 - 44 % Final    Monocytes 11/29/2022 12  4.0 - 12.0 % Final    Eosinophils % 11/29/2022 3  0.5 - 7.8 % Final    Basophils 11/29/2022 0  0.0 - 2.0 % Final    Immature Granulocytes 11/29/2022 1  0.0 - 5.0 % Final    Segs Absolute 11/29/2022 1.8  1.7 - 8.2 K/UL Final    Absolute Lymph # 11/29/2022 0.6  0.5 - 4.6 K/UL Final    Absolute Mono # 11/29/2022 0.4  0.1 - 1.3 K/UL Final    Absolute Eos # 11/29/2022 0.1  0.0 - 0.8 K/UL Final    Basophils Absolute 11/29/2022 0.0  0.0 - 0.2 K/UL Final    Absolute Immature Granulocyte 11/29/2022 0.0  0.0 - 0.5 K/UL Final    Sodium 11/29/2022 140  133 - 143 mmol/L Final    Potassium 11/29/2022 3.7  3.5 - 5.1 mmol/L Final    Chloride 11/29/2022 106  101 - 110 mmol/L Final    CO2 11/29/2022 26  21 - 32 mmol/L Final    Anion Gap 11/29/2022 8  2 - 11 mmol/L Final    Glucose 11/29/2022 126 (A)  65 - 100 mg/dL Final    BUN 11/29/2022 14  6 - 23 MG/DL Final    Creatinine 11/29/2022 0.60  0.6 - 1.0 MG/DL Final    Est, Glom Filt Rate 11/29/2022 >60  >60 ml/min/1.73m2 Final    Comment:      Pediatric calculator link: Tres.at. org/professionals/kdoqi/gfr_calculatorped       Effective Oct 3, 2022       These results are not intended for use in patients <25years of age. eGFR results are calculated without a race factor using  the 2021 CKD-EPI equation. Careful clinical correlation is recommended, particularly when comparing to results calculated using previous equations. The CKD-EPI equation is less accurate in patients with extremes of muscle mass, extra-renal metabolism of creatinine, excessive creatine ingestion, or following therapy that affects renal tubular secretion. Calcium 11/29/2022 9.0  8.3 - 10.4 MG/DL Final    Total Bilirubin 11/29/2022 0.5  0.2 - 1.1 MG/DL Final    ALT 11/29/2022 23  12 - 65 U/L Final    AST 11/29/2022 14 (A)  15 - 37 U/L Final    Alk Phosphatase 11/29/2022 60  50 - 136 U/L Final    Total Protein 11/29/2022 6.8  6.3 - 8.2 g/dL Final    Albumin 11/29/2022 3.7  3.5 - 5.0 g/dL Final    Globulin 11/29/2022 3.1  2.8 - 4.5 g/dL Final    Albumin/Globulin Ratio 11/29/2022 1.2  0.4 - 1.6   Final        Treatment Summary has been discussed and given to patient: n/a        -------------------------------------------------------------------------------------------------------------------  Please call our office at [phone] if you have any  of the following symptoms:   Fever of 100.5 or greater  Chills  Shortness of breath  Swelling or pain in one leg    After office hours an answering service is available and will contact a provider for emergencies or if you are experiencing any of the above symptoms. Patient does express an interest in My Chart. My Chart log in information explained on the after visit summary printout at the [facility] [address] desk.     Maggi Archer, RN  Nurse [address] [facility] [address] [phone]

## 2022-11-29 NOTE — PROGRESS NOTES
[facility] Hematology & Oncology: Office Visit Progress Note    Chief Complaint:    Rectal cancer    History of Present Illness:  Reason for Referral: Rectal cancer     Referring Provider:  Shawn Guzman MD     Primary Care Provider: Apollo Scott DO     Family History of Cancer/Hematologic Disorders: None noted     Presenting Symptoms: LLQ abdominal pain     Ms. Elex Holstein is a 49 y.o.  female who reports to have never used tobacco products. She denies use of alcohol or drug substances. Her medical history reports as rectal bleeding. Her surgical history reports as  and colonoscopy. On 7/15/22, Ms. Elex Holstein saw Dr Felicia Fang for her newly diagnosed rectal cancer. She reported to have had rectal bleeding for 2 months which prompted her first colonoscopy. A 5 cm half circumferential friable mass was identified at the most proximal valve of Singh, biopsy revealed at least carcinoma in situ. 2022 CT scan of c/a/p demonstrated no distal metastases. She reported intermittent left lower quadrant abdominal pain for years. She has EUS scheduled for August however Dr Felicia Fang reached out to Dr Presley Marks to move up her planned EUS. A referral was placed to hematology for consideration of neoadjuvant chemoradiation. Currently there is no referral to radiation oncology. Her 2022 CBC and CMP reported WNL except for a low creatinine of 0.47.     22 Colonoscopy         2022 Surgical Pathology  DIAGNOSIS  A:  \"RECTAL MASS BIOPSIES\":        FRAGMENTS OF AT LEAST IN SITU ADENOCARCINOMA. SEE COMMENT. COMMENT: BECAUSE BIOPSY FRAGMENTS ARE LIMITED PRIMARIALY TO MUCOSA,   SUBMUCOSAL INFILTRATION CANNOT BE RULED OUT   B:  \"RANDOM COLON BIOPSIES\":        FRAGMENTS OF HISTOLOGICALLY UNREMARKABLE LARGE INTESTINE   C:  \"ASCENDING COLON POLYP\":        FRAGMENT OF MIXED TUBULOVILLOUS ADENOMA     22 CT Chest Abdomen and Pelvis  FINDINGS:  Lungs: No pulmonary nodules. No airspace disease.   Airways: Trachea and proximal bronchi grossly patent. Pleura: No effusion or thickening or calcifications. Lymph Nodes: No enlarged axillary, hilar or mediastinal lymph nodes. Heart: Normal size. Coronaries: No definite calcifications. Aorta: Normal caliber. Esophagus: Thickening of the distal esophagus  Skeletal/Chest Wall: No gross bony lesions. Liver: A 4 mm cyst anterior segment right lobe, axial image 47, otherwise  unremarkable. Gallbladder: No gallstones identified  Bile Ducts: Not dilated. Pancreas: Unremarkable. Spleen: Uniform and normal size. Stomach: Unremarkable. Bowel: There is mural thickening of the rectum, probably a cyst 6 cm in length. Margins are indistinct. Small nodular densities probably represent mesorectal  node. Adrenals: Are normal size. Kidneys/Ureters: Enhance symmetrically. No hydronephrosis. Lymph Nodes: No grossly enlarged retroperitoneal, mesenteric, or pelvic  adenopathy. Vasculature: Aorta is normal caliber. Bones: No gross bony lesions. Other: No ascites. Impression  Mural thickening of the rectum suspicious for neoplasm. Margins are  indistinct. Probable small mesorectal lymph nodes. There is one small cyst in  liver. No pulmonary nodules. PET [id number]:  1. Hypermetabolic rectal mass consistent with known rectal malignancy. No   evidence of metastatic disease in the neck, chest, abdomen or pelvis. Small   perirectal lymph nodes are present but may be too small to accurately   characterize by PET imaging. No FDG activity noted in these nodes. Review of Systems:  Constitutional Fatigue. Denies fever or chills. Denies weight loss or appetite changes. Denies anorexia. HEENT Denies trauma, bluring vision, hearing loss, ear pain, nosebleeds, sore throat, neck pain and ear discharge. Skin Denies lesions or rashes. Lungs Denies shortness of breath, cough, sputum production or hemoptysis. Cardiovascular Denies chest pain, palpitations, orthopnea, claudication and leg swelling. Gastrointestinal LLQ pain. Blood in stool resolved. Nausea controlled. Denies vomiting.  Denies dysuria, frequency or hesitancy of urination   Neuro Peripheral neuropathy. Denies headaches, visual changes or ataxia. Denies dizziness, tingling, tremors,  speech change, focal weakness and headaches. Hematology Denies nasal/gum bleeding, denies easy bruise   Endo Denies heat/cold intolerance, denies diabetes. MSK Denies back pain, swollen legs, myalgias and falls. Psychiatric/Behavioral Denies depression and substance abuse. The patient is not nervous/anxious.        Allergies   Allergen Reactions    Amoxicillin Rash     Past Medical History:   Diagnosis Date    Cancer ([facility] [address]     rectal cancer-- dx 2022---- followed by dr Deonna Rocha-- per spouse after scans/test  then will determine what the plan is    Rectal bleeding     onset 2022     Past Surgical History:   Procedure Laterality Date     SECTION      COLONOSCOPY      COLONOSCOPY N/A 2022    COLONOSCOPY ULTRASOUND/ BMI 19 performed by Eusebio Burnette MD at [facility] ENDOSCOPY    IR PORT PLACEMENT EQUAL OR GREATER THAN 5 YEARS  2022    IR PORT PLACEMENT EQUAL OR GREATER THAN 5 YEARS 2022 [facility] SPECIALS     Family History   Problem Relation Age of Onset    High Blood Pressure Mother      Social History     Socioeconomic History    Marital status:      Spouse name: Not on file    Number of children: Not on file    Years of education: Not on file    Highest education level: Not on file   Occupational History    Not on file   Tobacco Use    Smoking status: Never    Smokeless tobacco: Never   Vaping Use    Vaping Use: Never used   Substance and Sexual Activity    Alcohol use: Yes     Comment: rare    Drug use: Never    Sexual activity: Not on file   Other Topics Concern    Not on file   Social History Narrative    Not on file     Social Determinants of Health     Financial Resource Strain: Not on file   Food Insecurity: Not Wt 121 lb (54.9 kg)   SpO2 96%   BMI 22.86 kg/m²     Physical Exam:  Constitutional: Oriented to person, place, and time. Well-developed and well-nourished. HEENT: Normocephalic and atraumatic. Oropharynx is clear and moist.   Conjunctivae and EOM are normal. Pupils are equal, round, and reactive to light. No scleral icterus. Neck supple. No JVD present. No tracheal deviation present. No thyromegaly present. Lymph node No palpable submandibular, cervical, supraclavicular, axillary and inguinal lymph nodes. Skin Warm and dry. No bruising and no rash noted. No erythema. No pallor. Respiratory Effort normal and breath sounds normal.  No respiratory distress. No wheezes. No rales. No tenderness. CVS Normal rate, regular rhythm and normal heart sounds. Exam reveals no gallop, no friction and no rub. No murmur heard. Abdomen Soft. Bowel sounds are normal. Exhibits no distension. There is no tenderness. There is no rebound and no guarding. Neuro Normal reflexes. No cranial nerve deficit. Exhibits normal muscle tone, 5 of 5 strength of all extremities. MSK Normal range of motion in general.  No edema and no tenderness.    Psych Normal mood, affect, behavior, judgment and thought content      Labs:  Recent Results (from the past 24 hour(s))   CBC with Auto Differential    Collection Time: 11/29/22  7:17 AM   Result Value Ref Range    WBC 2.9 (L) 4.3 - 11.1 K/uL    RBC 3.85 (L) 4.05 - 5.2 M/uL    Hemoglobin 12.6 11.7 - 15.4 g/dL    Hematocrit 37.2 35.8 - 46.3 %    MCV 96.6 82.0 - 102.0 FL    MCH 32.7 26.1 - 32.9 PG    MCHC 33.9 31.4 - 35.0 g/dL    RDW 12.9 11.9 - 14.6 %    Platelets 89 (L) 988 - 450 K/uL    MPV 10.4 9.4 - 12.3 FL    nRBC 0.00 0.0 - 0.2 K/uL    Differential Type AUTOMATED      Seg Neutrophils 62 43 - 78 %    Lymphocytes 22 13 - 44 %    Monocytes 12 4.0 - 12.0 %    Eosinophils % 3 0.5 - 7.8 %    Basophils 0 0.0 - 2.0 %    Immature Granulocytes 1 0.0 - 5.0 %    Segs Absolute 1. 8 1.7 - 8.2 K/UL    Absolute Lymph # 0.6 0.5 - 4.6 K/UL    Absolute Mono # 0.4 0.1 - 1.3 K/UL    Absolute Eos # 0.1 0.0 - 0.8 K/UL    Basophils Absolute 0.0 0.0 - 0.2 K/UL    Absolute Immature Granulocyte 0.0 0.0 - 0.5 K/UL   Comprehensive Metabolic Panel    Collection Time: 11/29/22  7:17 AM   Result Value Ref Range    Sodium 140 133 - 143 mmol/L    Potassium 3.7 3.5 - 5.1 mmol/L    Chloride 106 101 - 110 mmol/L    CO2 26 21 - 32 mmol/L    Anion Gap 8 2 - 11 mmol/L    Glucose 126 (H) 65 - 100 mg/dL    BUN 14 6 - 23 MG/DL    Creatinine 0.60 0.6 - 1.0 MG/DL    Est, Glom Filt Rate >60 >60 ml/min/1.73m2    Calcium 9.0 8.3 - 10.4 MG/DL    Total Bilirubin 0.5 0.2 - 1.1 MG/DL    ALT 23 12 - 65 U/L    AST 14 (L) 15 - 37 U/L    Alk Phosphatase 60 50 - 136 U/L    Total Protein 6.8 6.3 - 8.2 g/dL    Albumin 3.7 3.5 - 5.0 g/dL    Globulin 3.1 2.8 - 4.5 g/dL    Albumin/Globulin Ratio 1.2 0.4 - 1.6         Imaging:  No results found for this or any previous visit. ASSESSMENT/PLAN:   Diagnosis Orders   1. Rectal cancer ([facility] [address]        2. High risk medication use        3. Rectal discomfort        4. Diarrhea, unspecified type                  49 y.o. F urgently consulted for rectal carcinoma.   She had good general baseline health, developed frequency of bowel movement and blood in stool for 2 months, colonoscopy showed low rectal adenocarcinoma and urgently consulted to oncology, discussed carcinogenesis and staging, urgent arrangement of EUS reported T3N0 disease but quite large avid disease on PET with small lymph nodes, discussed at tumor board and concern of level of local invasion, arrange rectal MRI, discussed neoadjuvant treatment options with patient and wife and desired to treat aggressively for maximal chance of CR, arrange JENNIFER in the manner of Prodige 23 (Neoadjuvant rectal cancer treatment with JENNIFER using three months of modified FOLFIRINOX (oxaliplatin 85 mg/m2, leucovorin 400 mg/m2, irinotecan 180 mg/m2 day 1, and FU 2400 mg/m2 over 46 hours every 14 days) followed by long-course CRT (50 Gy in 25 fractions plus concurrent capecitabine), TME, and three additional months of FOLFOX or capecitabine), we discussed toxicities and management, arrange port placement, antiemetics, Emla, IV iron, rectal MRI confirmed chief receive M0 disease, proceed to cycle 1 neoadjuvant FOLFIRINOX 8/11/2022, generally tolerated well, 4 bowel movements a day but not watery, there was left lower quadrant pain, nausea, discussed management accordingly, labs reviewed and CEA down, signs of clinical response but complaining of increased fatigue, increased neuropathy, thrombocytopenic, discussed the risk of chronic neuropathy and patient desired to continue and reduce oxaliplatin to 60 mg/m2 from cycle 5, return on 10/19/2022 and reporting numbness in feet persistent, received cycle 6 without oxaliplatin, plan to start chemoradiation next month and coordinate with radiation oncology, discussed she has generally tolerated well with wonderful clinical response and CEA normalized, we discussed that adjuvant chemotherapy is part of the plan per protocol but will address her desire and concern after surgery, return on 11/14/2022 labs reviewed, proceed to Xeloda radiation, feet numbness mostly recovered and continue to monitor, again discussed left lower quadrant pain developed seemed chronically fluctuating before cancer was diagnosed, which may be related with pelvic inflammation or pelvic endometriosis and I doubt related to cancer symptom, she will discuss with Dr. Iker Gusman to explore in surgery, return on 11/21/2022 and reports 1 day of epigastric pain and nausea responded to Tums, continue Zofran as needed, Mary Ann-Colace for constipation. Here today for weekly follow up regarding her rectal cancer - week 3 Xeloda/radiation. She is tolerating well overall. She denies any nausea this week. She has frequent stools and about two of these a day are diarrhea.  She has some discomfort for a few minutes after each stool. She denies need for any pain medications. Suggested she use epsom salt sitz baths for healing and comfort. She denies any hand foot symptoms. No mucositis, eating without issue. Labs reviewed and okay to proceed with next week of chemoradiation. Call with any fevers, uncontrolled side effects from treatment or any other worrisome/concerning symptoms. Follow weekly or sooner if needed. All questions are answered to their satisfaction. They will call for further questions and concerns. ECOG PERFORMANCE STATUS - 0-Fully active, able to carry on all pre-disease performance without restriction. Pain - 0 - No pain/10. None/Minimal pain - not affecting QOL     Fatigue - No flowsheet data found. Distress - No flowsheet data found. Elements of this note have been dictated via voice recognition software. Text and phrases may be limited by the accuracy and autoconversion of the software. The chart has been reviewed, but errors may still be present.           HANY Villegas [address]  Office : [phone]  Fax : [phone]

## 2022-11-30 ENCOUNTER — HOSPITAL ENCOUNTER (OUTPATIENT)
Dept: RADIATION ONCOLOGY | Age: 47
Setting detail: RECURRING SERIES
Discharge: HOME | End: 2022-12-03
Payer: COMMERCIAL

## 2022-11-30 PROCEDURE — 77386 HC NTSTY MODUL RAD TX DLVR CPLX: CPT

## 2022-11-30 PROCEDURE — 77336 RADIATION PHYSICS CONSULT: CPT

## 2022-12-01 ENCOUNTER — HOSPITAL ENCOUNTER (OUTPATIENT)
Dept: RADIATION ONCOLOGY | Age: 47
Setting detail: RECURRING SERIES
Discharge: HOME | End: 2022-12-04
Payer: COMMERCIAL

## 2022-12-01 DIAGNOSIS — C20 RECTAL CANCER (HCC): Primary | ICD-10-CM

## 2022-12-01 PROCEDURE — 77386 HC NTSTY MODUL RAD TX DLVR CPLX: CPT

## 2022-12-02 ENCOUNTER — HOSPITAL ENCOUNTER (OUTPATIENT)
Dept: RADIATION ONCOLOGY | Age: 47
Setting detail: RECURRING SERIES
Discharge: HOME | End: 2022-12-05
Payer: COMMERCIAL

## 2022-12-02 PROCEDURE — 77386 HC NTSTY MODUL RAD TX DLVR CPLX: CPT

## 2022-12-02 NOTE — PROGRESS NOTES
RADIATION ONCOLOGY ON-TREATMENT VISIT  12/02/22    Visit today was conducted in [address] [place] [address] using video . Diagnosis:  Cancer Staging  Rectal cancer [facility])  Staging form: Colon And Rectum, AJCC 8th Edition  - Clinical: Stage IIA (cT3, cN0, cM0) - Signed by Babita Alcantara MD on 10/12/2022    This is a 49 y.o. female who is currently receiving chemoRT as part of JENNIFER. Current RT dose: 23.4/45 Gy in 13/25 fractions. Boost of 5.4 Gy in 3 fx to follow. Concurrent systemic therapy:  Xeloda    Subjective:  She reports some rectal pain/discomfort. Intermittent small amount of rectal bleeding. Nausea is controlled on Zofran. Intermittent diarrhea. No constipation. Objective: There were no vitals filed for this visit. Pain 0/10    General: Alert and conversant, in NAD  Abdomen: Non-distended    Assessment:  Patient is tolerating radiation therapy well with grade 1 proctitis. Plan:  -Low back soreness: resolved.   -Nausea: mild, continue Zofran prn.   -Rectal pain: start prepH or Anusol topically. Tylenol prn.   -Diarhea: Imodium prn.   -Continue RT as planned. -Treatment images reviewed. -Concurrent Xeloda as per Dr. Elvin Andujar. Labs will be drawn again next week. -The patient has a documented plan of care to address pain. As above.         Babita Alcantara MD  12/02/22

## 2022-12-05 ENCOUNTER — HOSPITAL ENCOUNTER (OUTPATIENT)
Dept: LAB | Age: 47
Discharge: HOME | End: 2022-12-08
Payer: COMMERCIAL

## 2022-12-05 ENCOUNTER — HOSPITAL ENCOUNTER (OUTPATIENT)
Dept: RADIATION ONCOLOGY | Age: 47
Setting detail: RECURRING SERIES
Discharge: HOME | End: 2022-12-08
Payer: COMMERCIAL

## 2022-12-05 ENCOUNTER — OFFICE VISIT (OUTPATIENT)
Dept: ONCOLOGY | Age: 47
End: 2022-12-05
Payer: COMMERCIAL

## 2022-12-05 VITALS
TEMPERATURE: 98.2 F | HEIGHT: 61 IN | SYSTOLIC BLOOD PRESSURE: 118 MMHG | OXYGEN SATURATION: 98 % | HEART RATE: 90 BPM | BODY MASS INDEX: 23.15 KG/M2 | DIASTOLIC BLOOD PRESSURE: 55 MMHG | WEIGHT: 122.6 LBS | RESPIRATION RATE: 14 BRPM

## 2022-12-05 DIAGNOSIS — B35.3 TINEA PEDIS OF LEFT FOOT: ICD-10-CM

## 2022-12-05 DIAGNOSIS — R52 PAIN: ICD-10-CM

## 2022-12-05 DIAGNOSIS — C20 RECTAL CANCER (HCC): ICD-10-CM

## 2022-12-05 DIAGNOSIS — Z79.899 HIGH RISK MEDICATION USE: ICD-10-CM

## 2022-12-05 DIAGNOSIS — C20 RECTAL CANCER (HCC): Primary | ICD-10-CM

## 2022-12-05 LAB
ALBUMIN SERPL-MCNC: 3.6 G/DL (ref 3.5–5)
ALBUMIN/GLOB SERPL: 1.1 {RATIO} (ref 0.4–1.6)
ALP SERPL-CCNC: 62 U/L (ref 50–136)
ALT SERPL-CCNC: 26 U/L (ref 12–65)
ANION GAP SERPL CALC-SCNC: 6 MMOL/L (ref 2–11)
AST SERPL-CCNC: 15 U/L (ref 15–37)
BASOPHILS # BLD: 0 K/UL (ref 0–0.2)
BASOPHILS NFR BLD: 0 % (ref 0–2)
BILIRUB SERPL-MCNC: 0.6 MG/DL (ref 0.2–1.1)
BUN SERPL-MCNC: 20 MG/DL (ref 6–23)
CALCIUM SERPL-MCNC: 9 MG/DL (ref 8.3–10.4)
CEA SERPL-MCNC: 1.4 NG/ML (ref 0–3)
CHLORIDE SERPL-SCNC: 106 MMOL/L (ref 101–110)
CO2 SERPL-SCNC: 27 MMOL/L (ref 21–32)
CREAT SERPL-MCNC: 0.6 MG/DL (ref 0.6–1)
DIFFERENTIAL METHOD BLD: ABNORMAL
EOSINOPHIL # BLD: 0.1 K/UL (ref 0–0.8)
EOSINOPHIL NFR BLD: 3 % (ref 0.5–7.8)
ERYTHROCYTE [DISTWIDTH] IN BLOOD BY AUTOMATED COUNT: 12.8 % (ref 11.9–14.6)
GLOBULIN SER CALC-MCNC: 3.2 G/DL (ref 2.8–4.5)
GLUCOSE SERPL-MCNC: 111 MG/DL (ref 65–100)
HCT VFR BLD AUTO: 37.2 % (ref 35.8–46.3)
HGB BLD-MCNC: 12.6 G/DL (ref 11.7–15.4)
IMM GRANULOCYTES # BLD AUTO: 0 K/UL (ref 0–0.5)
IMM GRANULOCYTES NFR BLD AUTO: 1 % (ref 0–5)
LYMPHOCYTES # BLD: 0.6 K/UL (ref 0.5–4.6)
LYMPHOCYTES NFR BLD: 20 % (ref 13–44)
MAGNESIUM SERPL-MCNC: 2 MG/DL (ref 1.8–2.4)
MCH RBC QN AUTO: 32.8 PG (ref 26.1–32.9)
MCHC RBC AUTO-ENTMCNC: 33.9 G/DL (ref 31.4–35)
MCV RBC AUTO: 96.9 FL (ref 82–102)
MONOCYTES # BLD: 0.2 K/UL (ref 0.1–1.3)
MONOCYTES NFR BLD: 8 % (ref 4–12)
NEUTS SEG # BLD: 2 K/UL (ref 1.7–8.2)
NEUTS SEG NFR BLD: 67 % (ref 43–78)
NRBC # BLD: 0 K/UL (ref 0–0.2)
PLATELET # BLD AUTO: 92 K/UL (ref 150–450)
PMV BLD AUTO: 9.7 FL (ref 9.4–12.3)
POTASSIUM SERPL-SCNC: 3.6 MMOL/L (ref 3.5–5.1)
PROT SERPL-MCNC: 6.8 G/DL (ref 6.3–8.2)
RBC # BLD AUTO: 3.84 M/UL (ref 4.05–5.2)
SODIUM SERPL-SCNC: 139 MMOL/L (ref 133–143)
WBC # BLD AUTO: 2.9 K/UL (ref 4.3–11.1)

## 2022-12-05 PROCEDURE — 80053 COMPREHEN METABOLIC PANEL: CPT

## 2022-12-05 PROCEDURE — 36415 COLL VENOUS BLD VENIPUNCTURE: CPT

## 2022-12-05 PROCEDURE — 99215 OFFICE O/P EST HI 40 MIN: CPT | Performed by: INTERNAL MEDICINE

## 2022-12-05 PROCEDURE — 83735 ASSAY OF MAGNESIUM: CPT

## 2022-12-05 PROCEDURE — 85025 COMPLETE CBC W/AUTO DIFF WBC: CPT

## 2022-12-05 PROCEDURE — 82378 CARCINOEMBRYONIC ANTIGEN: CPT

## 2022-12-05 RX ORDER — PRAMOXINE HYDROCHLORIDE 10 MG/G
AEROSOL, FOAM TOPICAL EVERY 4 HOURS PRN
Qty: 1 EACH | Refills: 2 | Status: SHIPPED | OUTPATIENT
Start: 2022-12-05

## 2022-12-05 RX ORDER — CLOTRIMAZOLE 1 %
CREAM (GRAM) TOPICAL
Qty: 1 EACH | Refills: 1 | Status: SHIPPED | OUTPATIENT
Start: 2022-12-05 | End: 2022-12-12

## 2022-12-05 ASSESSMENT — PATIENT HEALTH QUESTIONNAIRE - PHQ9
SUM OF ALL RESPONSES TO PHQ QUESTIONS 1-9: 0
SUM OF ALL RESPONSES TO PHQ9 QUESTIONS 1 & 2: 0
1. LITTLE INTEREST OR PLEASURE IN DOING THINGS: 0
2. FEELING DOWN, DEPRESSED OR HOPELESS: 0
SUM OF ALL RESPONSES TO PHQ QUESTIONS 1-9: 0

## 2022-12-05 NOTE — PROGRESS NOTES
[facility] Hematology & Oncology: Office Visit Progress Note    Chief Complaint:    Rectal cancer    History of Present Illness:  Reason for Referral: Rectal cancer     Referring Provider:  Sandee Calvert MD     Primary Care Provider: Antonio Wolf DO     Family History of Cancer/Hematologic Disorders: None noted     Presenting Symptoms: LLQ abdominal pain     Ms. Sarah Lance is a 49 y.o.  female who reports to have never used tobacco products. She denies use of alcohol or drug substances. Her medical history reports as rectal bleeding. Her surgical history reports as  and colonoscopy. On 7/15/22, Ms. Sarah Lance saw Dr Anthony Castellanos for her newly diagnosed rectal cancer. She reported to have had rectal bleeding for 2 months which prompted her first colonoscopy. A 5 cm half circumferential friable mass was identified at the most proximal valve of Singh, biopsy revealed at least carcinoma in situ. 2022 CT scan of c/a/p demonstrated no distal metastases. She reported intermittent left lower quadrant abdominal pain for years. She has EUS scheduled for August however Dr Anthony Castellanos reached out to Dr Bruce Tarango to move up her planned EUS. A referral was placed to hematology for consideration of neoadjuvant chemoradiation. Currently there is no referral to radiation oncology. Her 2022 CBC and CMP reported WNL except for a low creatinine of 0.47.     22 Colonoscopy         2022 Surgical Pathology  DIAGNOSIS  A:  \"RECTAL MASS BIOPSIES\":        FRAGMENTS OF AT LEAST IN SITU ADENOCARCINOMA. SEE COMMENT. COMMENT: BECAUSE BIOPSY FRAGMENTS ARE LIMITED PRIMARIALY TO MUCOSA,   SUBMUCOSAL INFILTRATION CANNOT BE RULED OUT   B:  \"RANDOM COLON BIOPSIES\":        FRAGMENTS OF HISTOLOGICALLY UNREMARKABLE LARGE INTESTINE   C:  \"ASCENDING COLON POLYP\":        FRAGMENT OF MIXED TUBULOVILLOUS ADENOMA     22 CT Chest Abdomen and Pelvis  FINDINGS:  Lungs: No pulmonary nodules. No airspace disease.   Airways: Trachea and proximal bronchi grossly patent. Pleura: No effusion or thickening or calcifications. Lymph Nodes: No enlarged axillary, hilar or mediastinal lymph nodes. Heart: Normal size. Coronaries: No definite calcifications. Aorta: Normal caliber. Esophagus: Thickening of the distal esophagus  Skeletal/Chest Wall: No gross bony lesions. Liver: A 4 mm cyst anterior segment right lobe, axial image 47, otherwise  unremarkable. Gallbladder: No gallstones identified  Bile Ducts: Not dilated. Pancreas: Unremarkable. Spleen: Uniform and normal size. Stomach: Unremarkable. Bowel: There is mural thickening of the rectum, probably a cyst 6 cm in length. Margins are indistinct. Small nodular densities probably represent mesorectal  node. Adrenals: Are normal size. Kidneys/Ureters: Enhance symmetrically. No hydronephrosis. Lymph Nodes: No grossly enlarged retroperitoneal, mesenteric, or pelvic  adenopathy. Vasculature: Aorta is normal caliber. Bones: No gross bony lesions. Other: No ascites. Impression  Mural thickening of the rectum suspicious for neoplasm. Margins are  indistinct. Probable small mesorectal lymph nodes. There is one small cyst in  liver. No pulmonary nodules. PET 56722:  1. Hypermetabolic rectal mass consistent with known rectal malignancy. No   evidence of metastatic disease in the neck, chest, abdomen or pelvis. Small   perirectal lymph nodes are present but may be too small to accurately   characterize by PET imaging. No FDG activity noted in these nodes. Review of Systems:  Constitutional Fatigue. Denies fever or chills. Denies weight loss or appetite changes. Denies anorexia. HEENT Denies trauma, bluring vision, hearing loss, ear pain, nosebleeds, sore throat, neck pain and ear discharge. Skin Denies lesions or rashes. Lungs Denies shortness of breath, cough, sputum production or hemoptysis. Cardiovascular Denies chest pain, palpitations, orthopnea, claudication and leg swelling. Gastrointestinal LLQ pain. Blood in stool resolved. Nausea controlled. Denies vomiting.  Denies dysuria, frequency or hesitancy of urination   Neuro Peripheral neuropathy. Denies headaches, visual changes or ataxia. Denies dizziness, tingling, tremors,  speech change, focal weakness and headaches. Hematology Denies nasal/gum bleeding, denies easy bruise   Endo Denies heat/cold intolerance, denies diabetes. MSK Denies back pain, swollen legs, myalgias and falls. Psychiatric/Behavioral Denies depression and substance abuse. The patient is not nervous/anxious.        Allergies   Allergen Reactions    Amoxicillin Rash     Past Medical History:   Diagnosis Date    Cancer ([facility] [address]     rectal cancer-- dx 2022---- followed by dr Giovanna Leiva-- per spouse after scans/test  then will determine what the plan is    Rectal bleeding     onset 2022     Past Surgical History:   Procedure Laterality Date     SECTION      COLONOSCOPY      COLONOSCOPY N/A 2022    COLONOSCOPY ULTRASOUND/ BMI 19 performed by Adelia Dunn MD at [facility] ENDOSCOPY    IR PORT PLACEMENT EQUAL OR GREATER THAN 5 YEARS  2022    IR PORT PLACEMENT EQUAL OR GREATER THAN 5 YEARS 2022 [facility] SPECIALS     Family History   Problem Relation Age of Onset    High Blood Pressure Mother      Social History     Socioeconomic History    Marital status:      Spouse name: Not on file    Number of children: Not on file    Years of education: Not on file    Highest education level: Not on file   Occupational History    Not on file   Tobacco Use    Smoking status: Never    Smokeless tobacco: Never   Vaping Use    Vaping Use: Never used   Substance and Sexual Activity    Alcohol use: Yes     Comment: rare    Drug use: Never    Sexual activity: Not on file   Other Topics Concern    Not on file   Social History Narrative    Not on file     Social Determinants of Health     Financial Resource Strain: Not on file   Food Insecurity: Not on file   Transportation Needs: Not on file   Physical Activity: Not on file   Stress: Not on file   Social Connections: Not on file   Intimate Partner Violence: Not on file   Housing Stability: Not on file     Current Outpatient Medications   Medication Sig Dispense Refill    clotrimazole (LOTRIMIN AF) 1 % cream Apply topically 2 times daily. 1 each 1    pramoxine HCl (PROCTOFOAM) 1 % foam Place around the anus every 4 hours as needed for Hemorrhoids 1 each 2    capecitabine (XELODA) 500 MG chemo tablet 1300 mg (2- 500 mg tabs and 2- 150 mg tabs) BID Monday - Friday, radiation days only. This is for first 4 weeks of radiation. 80 tablet 0    prochlorperazine (COMPAZINE) 10 MG tablet Take 1 tablet by mouth every 6 hours as needed (nausea or vomiting) 120 tablet 2    ondansetron (ZOFRAN) 4 MG tablet Take 1 tablet by mouth every 8 hours as needed for Nausea or Vomiting 90 tablet 2    lidocaine-prilocaine (EMLA) 2.5-2.5 % cream Apply topically as needed. Place small amount to port site 45 minutes prior to blood draws and/or infusion. Cover with plastic wrap 30 g 2    capecitabine (XELODA) 150 MG chemo tablet 1300 mg (2- 500 mg tabs and 2- 150 mg tabs) BID Monday - Friday, radiation days only. This is for first 4 weeks of radiation. (Patient not taking: Reported on 12/5/2022) 80 tablet 0    capecitabine (XELODA) 150 MG chemo tablet 1300 mg (2- 500 mg tabs and 2- 150 mg tabs) BID Monday - Friday, radiation days only. This is for last 2 weeks of radiation. (Patient not taking: Reported on 12/5/2022) 40 tablet 0    capecitabine (XELODA) 500 MG chemo tablet 1300 mg (2- 500 mg tabs and 2- 150 mg tabs) BID Monday - Friday, radiation days only. This is for last 2 weeks of radiation. (Patient not taking: No sig reported) 40 tablet 0     No current facility-administered medications for this visit.      Facility-Administered Medications Ordered in Other Visits   Medication Dose Route Frequency Provider Last Rate Last Admin sodium chloride flush 0.9 % injection 5-40 mL  5-40 mL IntraVENous PRN Mary Madera MD   10 mL at 09/24/22 2235       OBJECTIVE:  BP (!) 118/55 (Site: Right Upper Arm, Position: Sitting, Cuff Size: Medium Adult)   Pulse 90   Temp 98.2 °F (36.8 °C) (Oral)   Resp 14   Ht 5' 1\" (1.549 m)   Wt 122 lb 9.6 oz (55.6 kg)   SpO2 98%   BMI 23.17 kg/m²     Physical Exam:  Constitutional: Oriented to person, place, and time. Well-developed and well-nourished. HEENT: Normocephalic and atraumatic. Oropharynx is clear and moist.   Conjunctivae and EOM are normal. Pupils are equal, round, and reactive to light. No scleral icterus. Neck supple. No JVD present. No tracheal deviation present. No thyromegaly present. Lymph node No palpable submandibular, cervical, supraclavicular, axillary and inguinal lymph nodes. Skin Warm and dry. No bruising and no rash noted. No erythema. No pallor. Respiratory Effort normal and breath sounds normal.  No respiratory distress. No wheezes. No rales. No tenderness. CVS Normal rate, regular rhythm and normal heart sounds. Exam reveals no gallop, no friction and no rub. No murmur heard. Abdomen Soft. Bowel sounds are normal. Exhibits no distension. There is no tenderness. There is no rebound and no guarding. Neuro Normal reflexes. No cranial nerve deficit. Exhibits normal muscle tone, 5 of 5 strength of all extremities. MSK Normal range of motion in general.  No edema and no tenderness.    Psych Normal mood, affect, behavior, judgment and thought content      Labs:  Recent Results (from the past 24 hour(s))   CBC with Auto Differential    Collection Time: 12/05/22  8:31 AM   Result Value Ref Range    WBC 2.9 (L) 4.3 - 11.1 K/uL    RBC 3.84 (L) 4.05 - 5.2 M/uL    Hemoglobin 12.6 11.7 - 15.4 g/dL    Hematocrit 37.2 35.8 - 46.3 %    MCV 96.9 82.0 - 102.0 FL    MCH 32.8 26.1 - 32.9 PG    MCHC 33.9 31.4 - 35.0 g/dL    RDW 12.8 11.9 - 14.6 %    Platelets 92 (L) 822 - 450 K/uL    MPV 9.7 9.4 - 12.3 FL    nRBC 0.00 0.0 - 0.2 K/uL    Differential Type AUTOMATED      Seg Neutrophils 67 43 - 78 %    Lymphocytes 20 13 - 44 %    Monocytes 8 4.0 - 12.0 %    Eosinophils % 3 0.5 - 7.8 %    Basophils 0 0.0 - 2.0 %    Immature Granulocytes 1 0.0 - 5.0 %    Segs Absolute 2.0 1.7 - 8.2 K/UL    Absolute Lymph # 0.6 0.5 - 4.6 K/UL    Absolute Mono # 0.2 0.1 - 1.3 K/UL    Absolute Eos # 0.1 0.0 - 0.8 K/UL    Basophils Absolute 0.0 0.0 - 0.2 K/UL    Absolute Immature Granulocyte 0.0 0.0 - 0.5 K/UL   Comprehensive Metabolic Panel    Collection Time: 12/05/22  8:31 AM   Result Value Ref Range    Sodium 139 133 - 143 mmol/L    Potassium 3.6 3.5 - 5.1 mmol/L    Chloride 106 101 - 110 mmol/L    CO2 27 21 - 32 mmol/L    Anion Gap 6 2 - 11 mmol/L    Glucose 111 (H) 65 - 100 mg/dL    BUN 20 6 - 23 MG/DL    Creatinine 0.60 0.6 - 1.0 MG/DL    Est, Glom Filt Rate >60 >60 ml/min/1.73m2    Calcium 9.0 8.3 - 10.4 MG/DL    Total Bilirubin 0.6 0.2 - 1.1 MG/DL    ALT 26 12 - 65 U/L    AST 15 15 - 37 U/L    Alk Phosphatase 62 50 - 136 U/L    Total Protein 6.8 6.3 - 8.2 g/dL    Albumin 3.6 3.5 - 5.0 g/dL    Globulin 3.2 2.8 - 4.5 g/dL    Albumin/Globulin Ratio 1.1 0.4 - 1.6     Magnesium    Collection Time: 12/05/22  8:31 AM   Result Value Ref Range    Magnesium 2.0 1.8 - 2.4 mg/dL   CEA    Collection Time: 12/05/22  8:31 AM   Result Value Ref Range    CEA 1.4 0.0 - 3.0 ng/mL       Imaging:  No results found for this or any previous visit. ASSESSMENT/PLAN:   Diagnosis Orders   1. Rectal cancer (HCC)  CBC with Auto Differential    Comprehensive Metabolic Panel    CEA    Magnesium    clotrimazole (LOTRIMIN AF) 1 % cream    pramoxine HCl (PROCTOFOAM) 1 % foam      2. High risk medication use        3. Pain        4. Tinea pedis of left foot                    49 y.o. F urgently consulted for rectal carcinoma.   She had good general baseline health, developed frequency of bowel movement and blood in stool for 2 months, colonoscopy showed low rectal adenocarcinoma and urgently consulted to oncology, discussed carcinogenesis and staging, urgent arrangement of EUS reported T3N0 disease but quite large avid disease on PET with small lymph nodes, discussed at tumor board and concern of level of local invasion, arrange rectal MRI, discussed neoadjuvant treatment options with patient and wife and desired to treat aggressively for maximal chance of CR, arrange JENNIFER in the manner of Prodige 23 (Neoadjuvant rectal cancer treatment with JENNIFER using three months of modified FOLFIRINOX (oxaliplatin 85 mg/m2, leucovorin 400 mg/m2, irinotecan 180 mg/m2 day 1, and FU 2400 mg/m2 over 46 hours every 14 days) followed by long-course CRT (50 Gy in 25 fractions plus concurrent capecitabine), TME, and three additional months of FOLFOX or capecitabine), we discussed toxicities and management, arrange port placement, antiemetics, Emla, IV iron, rectal MRI confirmed chief receive M0 disease, proceed to cycle 1 neoadjuvant FOLFIRINOX 8/11/2022, generally tolerated well, 4 bowel movements a day but not watery, there was left lower quadrant pain, nausea, discussed management accordingly, labs reviewed and CEA down, signs of clinical response but complaining of increased fatigue, increased neuropathy, thrombocytopenic, discussed the risk of chronic neuropathy and patient desired to continue and reduce oxaliplatin to 60 mg/m2 from cycle 5, return on 10/19/2022 and reporting numbness in feet persistent, received cycle 6 without oxaliplatin, plan to start chemoradiation next month and coordinate with radiation oncology, discussed she has generally tolerated well with wonderful clinical response and CEA normalized, we discussed that adjuvant chemotherapy is part of the plan per protocol but will address her desire and concern after surgery, return on 11/14/2022 labs reviewed, proceed to Xeloda radiation, feet numbness mostly recovered and continue to monitor, again discussed left lower quadrant pain developed seemed chronically fluctuating before cancer was diagnosed, which may be related with pelvic inflammation or pelvic endometriosis and I doubt related to cancer symptom, she will discuss with Dr. Abhishek Nails to explore in surgery, return on 12/5/2022, platelet 92, ANC 2.0, reports pain at defecation and dysuria, discussed radiation toxicity, prescribed Proctofoam, Anusol, left foot pain with probable fungal infection, prescribe Lotrimin, proceed to week for Xeloda radiation, follow next week but call as needed. All questions are answered to their satisfaction. They will call for further questions and concerns. ECOG PERFORMANCE STATUS - 0-Fully active, able to carry on all pre-disease performance without restriction. Pain - 0 - No pain/10. None/Minimal pain - not affecting QOL     Fatigue - No flowsheet data found. Distress - No flowsheet data found. Elements of this note have been dictated via voice recognition software. Text and phrases may be limited by the accuracy and autoconversion of the software. The chart has been reviewed, but errors may still be present. Calin Aguilar M.D.   [address]  Office : [phone]  Fax : [phone]

## 2022-12-05 NOTE — PATIENT INSTRUCTIONS
65 - 100 mg/dL Final    BUN 12/05/2022 20  6 - 23 MG/DL Final    Creatinine 12/05/2022 0.60  0.6 - 1.0 MG/DL Final    Est, Glokevin Filt Rate 12/05/2022 >60  >60 ml/min/1.73m2 Final    Comment:      Pediatric calculator link: Tres.at. org/professionals/kdoqi/gfr_calculatorped       Effective Oct 3, 2022       These results are not intended for use in patients <25years of age. eGFR results are calculated without a race factor using  the 2021 CKD-EPI equation. Careful clinical correlation is recommended, particularly when comparing to results calculated using previous equations. The CKD-EPI equation is less accurate in patients with extremes of muscle mass, extra-renal metabolism of creatinine, excessive creatine ingestion, or following therapy that affects renal tubular secretion. Calcium 12/05/2022 9.0  8.3 - 10.4 MG/DL Final    Total Bilirubin 12/05/2022 0.6  0.2 - 1.1 MG/DL Final    ALT 12/05/2022 26  12 - 65 U/L Final    AST 12/05/2022 15  15 - 37 U/L Final    Alk Phosphatase 12/05/2022 62  50 - 136 U/L Final    Total Protein 12/05/2022 6.8  6.3 - 8.2 g/dL Final    Albumin 12/05/2022 3.6  3.5 - 5.0 g/dL Final    Globulin 12/05/2022 3.2  2.8 - 4.5 g/dL Final    Albumin/Globulin Ratio 12/05/2022 1.1  0.4 - 1.6   Final    Magnesium 12/05/2022 2.0  1.8 - 2.4 mg/dL Final    CEA 12/05/2022 1.4  0.0 - 3.0 ng/mL Final    Comment: Nonsmoker:  <3.0 ng/mL  Smoker:     <5.0 ng/mL  [employer]. Patient's results of tumor marker testing may not be comparable to labs using different manufacturers/methods.           Treatment Summary has been discussed and given to patient: n/a        -------------------------------------------------------------------------------------------------------------------  Please call our office at [phone] if you have any  of the following symptoms:   Fever of 100.5 or greater  Chills  Shortness of breath  Swelling or pain in one leg    After office hours an answering service is available and will contact a provider for emergencies or if you are experiencing any of the above symptoms. Patient did express an interest in My Chart. My Chart log in information explained on the after visit summary printout at the [facility] [address] desk.     Yobani Stephens RN

## 2022-12-06 ENCOUNTER — HOSPITAL ENCOUNTER (OUTPATIENT)
Dept: RADIATION ONCOLOGY | Age: 47
Setting detail: RECURRING SERIES
Discharge: HOME | End: 2022-12-09
Payer: COMMERCIAL

## 2022-12-06 PROCEDURE — 77386 HC NTSTY MODUL RAD TX DLVR CPLX: CPT

## 2022-12-06 PROCEDURE — 77300 RADIATION THERAPY DOSE PLAN: CPT

## 2022-12-06 PROCEDURE — 77338 DESIGN MLC DEVICE FOR IMRT: CPT

## 2022-12-07 ENCOUNTER — HOSPITAL ENCOUNTER (OUTPATIENT)
Dept: RADIATION ONCOLOGY | Age: 47
Setting detail: RECURRING SERIES
Discharge: HOME | End: 2022-12-10
Payer: COMMERCIAL

## 2022-12-07 DIAGNOSIS — C20 RECTAL CANCER (HCC): Primary | ICD-10-CM

## 2022-12-07 DIAGNOSIS — K62.89 PROCTITIS: Primary | ICD-10-CM

## 2022-12-07 PROCEDURE — 77386 HC NTSTY MODUL RAD TX DLVR CPLX: CPT

## 2022-12-07 PROCEDURE — 77336 RADIATION PHYSICS CONSULT: CPT

## 2022-12-07 RX ORDER — HYDROCORTISONE 25 MG/G
CREAM TOPICAL
Qty: 28 G | Refills: 3 | Status: SHIPPED | OUTPATIENT
Start: 2022-12-07

## 2022-12-07 RX ORDER — HYDROCORTISONE ACETATE 25 MG/1
25 SUPPOSITORY RECTAL 2 TIMES DAILY
Qty: 24 SUPPOSITORY | Refills: 2 | Status: SHIPPED | OUTPATIENT
Start: 2022-12-07

## 2022-12-07 NOTE — PROGRESS NOTES
Pt c/o pain after bowel movement. Mandarin interpretor used for visit. She states it used to go away but is now lasting a while. She states stools are loose and watery. She is taking Tylenol as needed for pain. I spoke with Dr. Hong Lockett. Instructed pt to try otc Anusol or Preparation H  as directed. Pt's spouse states she tried Hemorrhoid med one time and it did not work. I instructed them to use tid and may take a while to work. Dr. Vitaliy Lama had prescribed med but pharmacy did not have. They are picking up an alternative today. Pt will have otv Friday with Dr. Hong Lockett to assess affectiveness. Dahiana Shah.  Connie Danny

## 2022-12-08 ENCOUNTER — HOSPITAL ENCOUNTER (OUTPATIENT)
Dept: RADIATION ONCOLOGY | Age: 47
Setting detail: RECURRING SERIES
Discharge: HOME | End: 2022-12-11
Payer: COMMERCIAL

## 2022-12-08 PROCEDURE — 77386 HC NTSTY MODUL RAD TX DLVR CPLX: CPT

## 2022-12-09 ENCOUNTER — HOSPITAL ENCOUNTER (OUTPATIENT)
Dept: RADIATION ONCOLOGY | Age: 47
Setting detail: RECURRING SERIES
Discharge: HOME | End: 2022-12-12
Payer: COMMERCIAL

## 2022-12-09 VITALS
BODY MASS INDEX: 23.18 KG/M2 | DIASTOLIC BLOOD PRESSURE: 77 MMHG | HEART RATE: 84 BPM | WEIGHT: 122.7 LBS | SYSTOLIC BLOOD PRESSURE: 111 MMHG | TEMPERATURE: 98.3 F | OXYGEN SATURATION: 98 % | RESPIRATION RATE: 16 BRPM

## 2022-12-09 PROCEDURE — 77386 HC NTSTY MODUL RAD TX DLVR CPLX: CPT

## 2022-12-09 ASSESSMENT — PAIN SCALES - GENERAL: PAINLEVEL_OUTOF10: 4

## 2022-12-09 ASSESSMENT — PAIN DESCRIPTION - DESCRIPTORS: DESCRIPTORS: SHARP;BURNING

## 2022-12-09 ASSESSMENT — PAIN DESCRIPTION - LOCATION: LOCATION: RECTUM

## 2022-12-09 NOTE — PROGRESS NOTES
RADIATION ONCOLOGY ON-TREATMENT VISIT  12/09/22    Visit today was conducted in [address] [facility] using video . Diagnosis:  Cancer Staging  Rectal cancer [facility])  Staging form: Colon And Rectum, AJCC 8th Edition  - Clinical: Stage IIA (cT3, cN0, cM0) - Signed by Raynaldo Dancer, MD on 10/12/2022    This is a 49 y.o. female who is currently receiving chemoRT as part of JENNIFER. Current RT dose: 32.4/45 Gy in 18/25 fractions. Boost of 5.4 Gy in 3 fx to follow. Concurrent systemic therapy:  Xeloda    Subjective:  She has severe rectal pain with bowel movements. Using pramoxine-hydrocortisone cream for last 2 days which is helping some. Small amount of blood with wiping. Having loose stools x4 daily, diarrhea once daily. Burning with urination only after bowel movements. Taking Tylenol prn, does not want stronger pain med at this time. Objective:  Vitals:    12/09/22 0815   BP: 111/77   Pulse: 84   Resp: 16   Temp: 98.3 °F (36.8 °C)   TempSrc: Oral   SpO2: 98%   Weight: 122 lb 11.2 oz (55.7 kg)     Pain 10/10 during BM    General: Alert and conversant, in NAD  Abdomen: Non-distended    Assessment:  Patient is tolerating radiation therapy fairly well with grade 2 proctitis. Plan:  -Low back soreness: resolved.   -Nausea: mild, continue Zofran prn.   -Rectal pain: continue Proctofoam/Anusol ointment. Tylenol prn. Anusol suppositories were too expensive.    -Loose stools/Diarrhea: encouraged her to start Imodium prn.   -Continue RT as planned. -Treatment images reviewed. -Concurrent Xeloda as per Dr. Miroslava Martinez. Labs stable to continue RT. -The patient has a documented plan of care to address pain. As above. Offered stronger pain med, she declined at this time but will let me know if she changes her mind later on.        Raynaldo Dancer, MD  12/09/22

## 2022-12-12 ENCOUNTER — CLINICAL DOCUMENTATION (OUTPATIENT)
Dept: ONCOLOGY | Age: 47
End: 2022-12-12

## 2022-12-12 ENCOUNTER — OFFICE VISIT (OUTPATIENT)
Dept: ONCOLOGY | Age: 47
End: 2022-12-12
Payer: COMMERCIAL

## 2022-12-12 ENCOUNTER — APPOINTMENT (OUTPATIENT)
Dept: RADIATION ONCOLOGY | Age: 47
End: 2022-12-12
Payer: COMMERCIAL

## 2022-12-12 ENCOUNTER — HOSPITAL ENCOUNTER (OUTPATIENT)
Dept: LAB | Age: 47
Discharge: HOME | End: 2022-12-15
Payer: COMMERCIAL

## 2022-12-12 VITALS
HEIGHT: 61 IN | BODY MASS INDEX: 22.84 KG/M2 | SYSTOLIC BLOOD PRESSURE: 115 MMHG | OXYGEN SATURATION: 98 % | DIASTOLIC BLOOD PRESSURE: 66 MMHG | WEIGHT: 121 LBS | RESPIRATION RATE: 20 BRPM | HEART RATE: 95 BPM | TEMPERATURE: 98.9 F

## 2022-12-12 DIAGNOSIS — T45.1X5A CHEMOTHERAPY-INDUCED NEUTROPENIA (HCC): ICD-10-CM

## 2022-12-12 DIAGNOSIS — G89.3 CANCER RELATED PAIN: ICD-10-CM

## 2022-12-12 DIAGNOSIS — D70.1 CHEMOTHERAPY-INDUCED NEUTROPENIA (HCC): ICD-10-CM

## 2022-12-12 DIAGNOSIS — C20 RECTAL CANCER (HCC): Primary | ICD-10-CM

## 2022-12-12 DIAGNOSIS — C20 RECTAL CANCER (HCC): ICD-10-CM

## 2022-12-12 DIAGNOSIS — Z79.899 HIGH RISK MEDICATION USE: ICD-10-CM

## 2022-12-12 LAB
ALBUMIN SERPL-MCNC: 3.7 G/DL (ref 3.5–5)
ALBUMIN/GLOB SERPL: 1.1 {RATIO} (ref 0.4–1.6)
ALP SERPL-CCNC: 59 U/L (ref 50–136)
ALT SERPL-CCNC: 17 U/L (ref 12–65)
ANION GAP SERPL CALC-SCNC: 8 MMOL/L (ref 2–11)
AST SERPL-CCNC: 12 U/L (ref 15–37)
BASOPHILS # BLD: 0 K/UL (ref 0–0.2)
BASOPHILS NFR BLD: 1 % (ref 0–2)
BILIRUB SERPL-MCNC: 0.6 MG/DL (ref 0.2–1.1)
BUN SERPL-MCNC: 12 MG/DL (ref 6–23)
CALCIUM SERPL-MCNC: 9.2 MG/DL (ref 8.3–10.4)
CEA SERPL-MCNC: 1.3 NG/ML (ref 0–3)
CHLORIDE SERPL-SCNC: 106 MMOL/L (ref 101–110)
CO2 SERPL-SCNC: 25 MMOL/L (ref 21–32)
CREAT SERPL-MCNC: 0.7 MG/DL (ref 0.6–1)
DIFFERENTIAL METHOD BLD: ABNORMAL
EOSINOPHIL # BLD: 0 K/UL (ref 0–0.8)
EOSINOPHIL NFR BLD: 2 % (ref 0.5–7.8)
ERYTHROCYTE [DISTWIDTH] IN BLOOD BY AUTOMATED COUNT: 12.6 % (ref 11.9–14.6)
GLOBULIN SER CALC-MCNC: 3.4 G/DL (ref 2.8–4.5)
GLUCOSE SERPL-MCNC: 126 MG/DL (ref 65–100)
HCT VFR BLD AUTO: 36.5 % (ref 35.8–46.3)
HGB BLD-MCNC: 12.3 G/DL (ref 11.7–15.4)
IMM GRANULOCYTES # BLD AUTO: 0 K/UL (ref 0–0.5)
IMM GRANULOCYTES NFR BLD AUTO: 1 % (ref 0–5)
LYMPHOCYTES # BLD: 0.3 K/UL (ref 0.5–4.6)
LYMPHOCYTES NFR BLD: 16 % (ref 13–44)
MAGNESIUM SERPL-MCNC: 2.2 MG/DL (ref 1.8–2.4)
MCH RBC QN AUTO: 32.1 PG (ref 26.1–32.9)
MCHC RBC AUTO-ENTMCNC: 33.7 G/DL (ref 31.4–35)
MCV RBC AUTO: 95.3 FL (ref 82–102)
MONOCYTES # BLD: 0.5 K/UL (ref 0.1–1.3)
MONOCYTES NFR BLD: 26 % (ref 4–12)
NEUTS SEG # BLD: 1.1 K/UL (ref 1.7–8.2)
NEUTS SEG NFR BLD: 54 % (ref 43–78)
NRBC # BLD: 0 K/UL (ref 0–0.2)
PLATELET # BLD AUTO: 89 K/UL (ref 150–450)
PMV BLD AUTO: 11.1 FL (ref 9.4–12.3)
POTASSIUM SERPL-SCNC: 3.7 MMOL/L (ref 3.5–5.1)
PROT SERPL-MCNC: 7.1 G/DL (ref 6.3–8.2)
RBC # BLD AUTO: 3.83 M/UL (ref 4.05–5.2)
SODIUM SERPL-SCNC: 139 MMOL/L (ref 133–143)
WBC # BLD AUTO: 1.9 K/UL (ref 4.3–11.1)

## 2022-12-12 PROCEDURE — 36415 COLL VENOUS BLD VENIPUNCTURE: CPT

## 2022-12-12 PROCEDURE — 83735 ASSAY OF MAGNESIUM: CPT

## 2022-12-12 PROCEDURE — 85025 COMPLETE CBC W/AUTO DIFF WBC: CPT

## 2022-12-12 PROCEDURE — 80053 COMPREHEN METABOLIC PANEL: CPT

## 2022-12-12 PROCEDURE — 82378 CARCINOEMBRYONIC ANTIGEN: CPT

## 2022-12-12 PROCEDURE — 99215 OFFICE O/P EST HI 40 MIN: CPT | Performed by: INTERNAL MEDICINE

## 2022-12-12 RX ORDER — TRAMADOL HYDROCHLORIDE 50 MG/1
50 TABLET ORAL EVERY 6 HOURS PRN
Qty: 28 TABLET | Refills: 0 | Status: SHIPPED | OUTPATIENT
Start: 2022-12-12 | End: 2022-12-26

## 2022-12-12 ASSESSMENT — PATIENT HEALTH QUESTIONNAIRE - PHQ9
SUM OF ALL RESPONSES TO PHQ QUESTIONS 1-9: 0
SUM OF ALL RESPONSES TO PHQ QUESTIONS 1-9: 0
2. FEELING DOWN, DEPRESSED OR HOPELESS: 0
1. LITTLE INTEREST OR PLEASURE IN DOING THINGS: 0
SUM OF ALL RESPONSES TO PHQ QUESTIONS 1-9: 0
SUM OF ALL RESPONSES TO PHQ QUESTIONS 1-9: 0
SUM OF ALL RESPONSES TO PHQ9 QUESTIONS 1 & 2: 0

## 2022-12-12 NOTE — PATIENT INSTRUCTIONS
Patient Instructions from Today's Visit    Reason for Visit:  Prechemo Xeloda/Radiation    Diagnosis Information:  https://[email or web address]/. net/about-us/asco-answers-patient-education-materials/kfbh-qqgfral-jaad-sheets    Plan:  -Continue radiation and Xeloda.  We will reduce dose of Xeloda to 1000mg 2 times a day                                                                                                 -Tramadol for pain    Follow Up:  As scheduled    Recent Lab Results:  Hospital Outpatient Visit on 12/12/2022   Component Date Value Ref Range Status    WBC 12/12/2022 1.9 (A)  4.3 - 11.1 K/uL Final    Comment: RESULTS CHECKED X 2  RESULTS VERIFIED, PHONED TO AND READ BACK BY  Viktoria Gilford RN AT 1550 ON 12/12/22 BY DEV      RBC 12/12/2022 3.83 (A)  4.05 - 5.2 M/uL Final    Hemoglobin 12/12/2022 12.3  11.7 - 15.4 g/dL Final    Hematocrit 12/12/2022 36.5  35.8 - 46.3 % Final    MCV 12/12/2022 95.3  82.0 - 102.0 FL Final    MCH 12/12/2022 32.1  26.1 - 32.9 PG Final    MCHC 12/12/2022 33.7  31.4 - 35.0 g/dL Final    RDW 12/12/2022 12.6  11.9 - 14.6 % Final    Platelets 72/47/5961 89 (A)  150 - 450 K/uL Final    MPV 12/12/2022 11.1  9.4 - 12.3 FL Final    nRBC 12/12/2022 0.00  0.0 - 0.2 K/uL Final    **Note: Absolute NRBC parameter is now reported with Hemogram**    Differential Type 12/12/2022 AUTOMATED    Final    Seg Neutrophils 12/12/2022 54  43 - 78 % Final    Lymphocytes 12/12/2022 16  13 - 44 % Final    Monocytes 12/12/2022 26 (A)  4.0 - 12.0 % Final    Eosinophils % 12/12/2022 2  0.5 - 7.8 % Final    Basophils 12/12/2022 1  0.0 - 2.0 % Final    Immature Granulocytes 12/12/2022 1  0.0 - 5.0 % Final    Segs Absolute 12/12/2022 1.1 (A)  1.7 - 8.2 K/UL Final    Absolute Lymph # 12/12/2022 0.3 (A)  0.5 - 4.6 K/UL Final    Absolute Mono # 12/12/2022 0.5  0.1 - 1.3 K/UL Final    Absolute Eos # 12/12/2022 0.0  0.0 - 0.8 K/UL Final    Basophils Absolute 12/12/2022 0.0  0.0 - 0.2 K/UL Final    Absolute Immature Granulocyte 12/12/2022 0.0  0.0 - 0.5 K/UL Final    Sodium 12/12/2022 139  133 - 143 mmol/L Final    Potassium 12/12/2022 3.7  3.5 - 5.1 mmol/L Final    Chloride 12/12/2022 106  101 - 110 mmol/L Final    CO2 12/12/2022 25  21 - 32 mmol/L Final    Anion Gap 12/12/2022 8  2 - 11 mmol/L Final    Glucose 12/12/2022 126 (A)  65 - 100 mg/dL Final    BUN 12/12/2022 12  6 - 23 MG/DL Final    Creatinine 12/12/2022 0.70  0.6 - 1.0 MG/DL Final    Est, Glom Filt Rate 12/12/2022 >60  >60 ml/min/1.73m2 Final    Comment:      Pediatric calculator link: Tres.at. org/professionals/kdoqi/gfr_calculatorped       These results are not intended for use in patients <25years of age. eGFR results are calculated without a race factor using  the 2021 CKD-EPI equation. Careful clinical correlation is recommended, particularly when comparing to results calculated using previous equations. The CKD-EPI equation is less accurate in patients with extremes of muscle mass, extra-renal metabolism of creatinine, excessive creatine ingestion, or following therapy that affects renal tubular secretion. Calcium 12/12/2022 9.2  8.3 - 10.4 MG/DL Final    Total Bilirubin 12/12/2022 0.6  0.2 - 1.1 MG/DL Final    ALT 12/12/2022 17  12 - 65 U/L Final    AST 12/12/2022 12 (A)  15 - 37 U/L Final    Alk Phosphatase 12/12/2022 59  50 - 136 U/L Final    Total Protein 12/12/2022 7.1  6.3 - 8.2 g/dL Final    Albumin 12/12/2022 3.7  3.5 - 5.0 g/dL Final    Globulin 12/12/2022 3.4  2.8 - 4.5 g/dL Final    Albumin/Globulin Ratio 12/12/2022 1.1  0.4 - 1.6   Final    CEA 12/12/2022 1.3  0.0 - 3.0 ng/mL Final    Comment: Nonsmoker:  <3.0 ng/mL  Smoker:     <5.0 ng/mL  [employer]. Patient's results of tumor marker testing may not be comparable to labs using different manufacturers/methods.       Magnesium 12/12/2022 2.2  1.8 - 2.4 mg/dL Final        Treatment Summary has been discussed and given to patient: n/a        -------------------------------------------------------------------------------------------------------------------  Please call our office at [phone] if you have any  of the following symptoms:   Fever of 100.5 or greater  Chills  Shortness of breath  Swelling or pain in one leg    After office hours an answering service is available and will contact a provider for emergencies or if you are experiencing any of the above symptoms. Patient does express an interest in My Chart. My Chart log in information explained on the after visit summary printout at the [address] [address] [address] desk.     Roshni Conklin RN  Nurse Navigator  [address] [facility] [address] [phone]

## 2022-12-12 NOTE — PROGRESS NOTES
[facility]: Office Visit Progress Note    Chief Complaint:    Rectal cancer    History of Present Illness:  Reason for Referral: Rectal cancer     Referring Provider:  Mart Reed MD     Primary Care Provider: Mary Prescott DO     Family History of Cancer/Hematologic Disorders: None noted     Presenting Symptoms: LLQ abdominal pain     Ms. Hannah Perez is a 49 y.o.  female who reports to have never used tobacco products. She denies use of alcohol or drug substances. Her medical history reports as rectal bleeding. Her surgical history reports as  and colonoscopy. On 7/15/22, Ms. Hannah Perez saw Dr Vidya Valadez for her newly diagnosed rectal cancer. She reported to have had rectal bleeding for 2 months which prompted her first colonoscopy. A 5 cm half circumferential friable mass was identified at the most proximal valve of Singh, biopsy revealed at least carcinoma in situ. 2022 CT scan of c/a/p demonstrated no distal metastases. She reported intermittent left lower quadrant abdominal pain for years. She has EUS scheduled for August however Dr Vidya Valadez reached out to Dr Gila Morley to move up her planned EUS. A referral was placed to hematology for consideration of neoadjuvant chemoradiation. Currently there is no referral to radiation oncology. Her 2022 CBC and CMP reported WNL except for a low creatinine of 0.47.     22 Colonoscopy         2022 Surgical Pathology  DIAGNOSIS  A:  \"RECTAL MASS BIOPSIES\":        FRAGMENTS OF AT LEAST IN SITU ADENOCARCINOMA. SEE COMMENT. COMMENT: BECAUSE BIOPSY FRAGMENTS ARE LIMITED PRIMARIALY TO MUCOSA,   SUBMUCOSAL INFILTRATION CANNOT BE RULED OUT   B:  \"RANDOM COLON BIOPSIES\":        FRAGMENTS OF HISTOLOGICALLY UNREMARKABLE LARGE INTESTINE   C:  \"ASCENDING COLON POLYP\":        FRAGMENT OF MIXED TUBULOVILLOUS ADENOMA     22 CT Chest Abdomen and Pelvis  FINDINGS:  Lungs: No pulmonary nodules. No airspace disease.   Airways: Trachea and proximal bronchi grossly patent. Pleura: No effusion or thickening or calcifications. Lymph Nodes: No enlarged axillary, hilar or mediastinal lymph nodes. Heart: Normal size. Coronaries: No definite calcifications. Aorta: Normal caliber. Esophagus: Thickening of the distal esophagus  Skeletal/Chest Wall: No gross bony lesions. Liver: A 4 mm cyst anterior segment right lobe, axial image 47, otherwise  unremarkable. Gallbladder: No gallstones identified  Bile Ducts: Not dilated. Pancreas: Unremarkable. Spleen: Uniform and normal size. Stomach: Unremarkable. Bowel: There is mural thickening of the rectum, probably a cyst 6 cm in length. Margins are indistinct. Small nodular densities probably represent mesorectal  node. Adrenals: Are normal size. Kidneys/Ureters: Enhance symmetrically. No hydronephrosis. Lymph Nodes: No grossly enlarged retroperitoneal, mesenteric, or pelvic  adenopathy. Vasculature: Aorta is normal caliber. Bones: No gross bony lesions. Other: No ascites. Impression  Mural thickening of the rectum suspicious for neoplasm. Margins are  indistinct. Probable small mesorectal lymph nodes. There is one small cyst in  liver. No pulmonary nodules. PET 09421:  1. Hypermetabolic rectal mass consistent with known rectal malignancy. No   evidence of metastatic disease in the neck, chest, abdomen or pelvis. Small   perirectal lymph nodes are present but may be too small to accurately   characterize by PET imaging. No FDG activity noted in these nodes. Review of Systems:  Constitutional Fatigue. Denies fever or chills. Denies weight loss or appetite changes. Denies anorexia. HEENT Denies trauma, bluring vision, hearing loss, ear pain, nosebleeds, sore throat, neck pain and ear discharge. Skin Denies lesions or rashes. Lungs Denies shortness of breath, cough, sputum production or hemoptysis. Cardiovascular Denies chest pain, palpitations, orthopnea, claudication and leg swelling. Gastrointestinal LLQ pain. Blood in stool resolved. Nausea controlled. Denies vomiting.  Denies dysuria, frequency or hesitancy of urination   Neuro Peripheral neuropathy. Denies headaches, visual changes or ataxia. Denies dizziness, tingling, tremors,  speech change, focal weakness and headaches. Hematology Denies nasal/gum bleeding, denies easy bruise   Endo Denies heat/cold intolerance, denies diabetes. MSK Denies back pain, swollen legs, myalgias and falls. Psychiatric/Behavioral Denies depression and substance abuse. The patient is not nervous/anxious.        Allergies   Allergen Reactions    Amoxicillin Rash     Past Medical History:   Diagnosis Date    Cancer ([facility] [address]     rectal cancer-- dx 2022---- followed by dr Sonia Toussaint-- per spouse after scans/test  then will determine what the plan is    Rectal bleeding     onset 2022     Past Surgical History:   Procedure Laterality Date     SECTION      COLONOSCOPY      COLONOSCOPY N/A 2022    COLONOSCOPY ULTRASOUND/ BMI 19 performed by Blanche Lane MD at [facility] ENDOSCOPY    IR PORT PLACEMENT EQUAL OR GREATER THAN 5 YEARS  2022    IR PORT PLACEMENT EQUAL OR GREATER THAN 5 YEARS 2022 [facility]     Family History   Problem Relation Age of Onset    High Blood Pressure Mother      Social History     Socioeconomic History    Marital status:      Spouse name: Not on file    Number of children: Not on file    Years of education: Not on file    Highest education level: Not on file   Occupational History    Not on file   Tobacco Use    Smoking status: Never    Smokeless tobacco: Never   Vaping Use    Vaping Use: Never used   Substance and Sexual Activity    Alcohol use: Yes     Comment: rare    Drug use: Never    Sexual activity: Not on file   Other Topics Concern    Not on file   Social History Narrative    Not on file     Social Determinants of Health     Financial Resource Strain: Not on file   Food Insecurity: Not on file   Transportation Needs: Not on file   Physical Activity: Not on file   Stress: Not on file   Social Connections: Not on file   Intimate Partner Violence: Not on file   Housing Stability: Not on file     Current Outpatient Medications   Medication Sig Dispense Refill    traMADol (ULTRAM) 50 MG tablet Take 1 tablet by mouth every 6 hours as needed for Pain for up to 14 days. Intended supply: 7 days. Take lowest dose possible to manage pain 28 tablet 0    hydrocortisone (ANUSOL-HC) 25 MG suppository Place 1 suppository rectally 2 times daily As needed for rectal pain 24 suppository 2    hydrocortisone (ANUSOL-HC) 2.5 % CREA rectal cream Apply to anal area twice daily 28 g 3    pramoxine-hydrocortisone 1-2.5 % cream Apply topically 3 times daily. 1 each 1    clotrimazole (LOTRIMIN AF) 1 % cream Apply topically 2 times daily. 1 each 1    pramoxine HCl (PROCTOFOAM) 1 % foam Place around the anus every 4 hours as needed for Hemorrhoids 1 each 2    capecitabine (XELODA) 500 MG chemo tablet 1300 mg (2- 500 mg tabs and 2- 150 mg tabs) BID Monday - Friday, radiation days only. This is for first 4 weeks of radiation. 80 tablet 0    prochlorperazine (COMPAZINE) 10 MG tablet Take 1 tablet by mouth every 6 hours as needed (nausea or vomiting) 120 tablet 2    ondansetron (ZOFRAN) 4 MG tablet Take 1 tablet by mouth every 8 hours as needed for Nausea or Vomiting 90 tablet 2    lidocaine-prilocaine (EMLA) 2.5-2.5 % cream Apply topically as needed. Place small amount to port site 45 minutes prior to blood draws and/or infusion. Cover with plastic wrap 30 g 2    capecitabine (XELODA) 150 MG chemo tablet 1300 mg (2- 500 mg tabs and 2- 150 mg tabs) BID Monday - Friday, radiation days only. This is for first 4 weeks of radiation. (Patient not taking: No sig reported) 80 tablet 0    capecitabine (XELODA) 150 MG chemo tablet 1300 mg (2- 500 mg tabs and 2- 150 mg tabs) BID Monday - Friday, radiation days only.   This is for last 2 weeks of radiation. (Patient not taking: No sig reported) 40 tablet 0    capecitabine (XELODA) 500 MG chemo tablet 1300 mg (2- 500 mg tabs and 2- 150 mg tabs) BID Monday - Friday, radiation days only. This is for last 2 weeks of radiation. (Patient not taking: No sig reported) 40 tablet 0     No current facility-administered medications for this visit. Facility-Administered Medications Ordered in Other Visits   Medication Dose Route Frequency Provider Last Rate Last Admin    sodium chloride flush 0.9 % injection 5-40 mL  5-40 mL IntraVENous PRN Denver Mcgovern MD   10 mL at 09/24/22 1644       OBJECTIVE:  /66 (Site: Right Upper Arm, Position: Sitting, Cuff Size: Medium Adult)   Pulse 95   Temp 98.9 °F (37.2 °C) (Oral)   Resp 20   Ht 5' 1\" (1.549 m)   Wt 121 lb (54.9 kg)   SpO2 98%   BMI 22.86 kg/m²     Physical Exam:  Constitutional: Oriented to person, place, and time. Well-developed and well-nourished. HEENT: Normocephalic and atraumatic. Oropharynx is clear and moist.   Conjunctivae and EOM are normal. Pupils are equal, round, and reactive to light. No scleral icterus. Neck supple. No JVD present. No tracheal deviation present. No thyromegaly present. Lymph node No palpable submandibular, cervical, supraclavicular, axillary and inguinal lymph nodes. Skin Warm and dry. No bruising and no rash noted. No erythema. No pallor. Respiratory Effort normal and breath sounds normal.  No respiratory distress. No wheezes. No rales. No tenderness. CVS Normal rate, regular rhythm and normal heart sounds. Exam reveals no gallop, no friction and no rub. No murmur heard. Abdomen Soft. Bowel sounds are normal. Exhibits no distension. There is no tenderness. There is no rebound and no guarding. Neuro Normal reflexes. No cranial nerve deficit. Exhibits normal muscle tone, 5 of 5 strength of all extremities. MSK Normal range of motion in general.  No edema and no tenderness. Psych Normal mood, affect, behavior, judgment and thought content      Labs:  Recent Results (from the past 24 hour(s))   CBC with Auto Differential    Collection Time: 12/12/22  8:42 AM   Result Value Ref Range    WBC 1.9 (LL) 4.3 - 11.1 K/uL    RBC 3.83 (L) 4.05 - 5.2 M/uL    Hemoglobin 12.3 11.7 - 15.4 g/dL    Hematocrit 36.5 35.8 - 46.3 %    MCV 95.3 82.0 - 102.0 FL    MCH 32.1 26.1 - 32.9 PG    MCHC 33.7 31.4 - 35.0 g/dL    RDW 12.6 11.9 - 14.6 %    Platelets 89 (L) 473 - 450 K/uL    MPV 11.1 9.4 - 12.3 FL    nRBC 0.00 0.0 - 0.2 K/uL    Differential Type AUTOMATED      Seg Neutrophils 54 43 - 78 %    Lymphocytes 16 13 - 44 %    Monocytes 26 (H) 4.0 - 12.0 %    Eosinophils % 2 0.5 - 7.8 %    Basophils 1 0.0 - 2.0 %    Immature Granulocytes 1 0.0 - 5.0 %    Segs Absolute 1.1 (L) 1.7 - 8.2 K/UL    Absolute Lymph # 0.3 (L) 0.5 - 4.6 K/UL    Absolute Mono # 0.5 0.1 - 1.3 K/UL    Absolute Eos # 0.0 0.0 - 0.8 K/UL    Basophils Absolute 0.0 0.0 - 0.2 K/UL    Absolute Immature Granulocyte 0.0 0.0 - 0.5 K/UL   Comprehensive Metabolic Panel    Collection Time: 12/12/22  8:42 AM   Result Value Ref Range    Sodium 139 133 - 143 mmol/L    Potassium 3.7 3.5 - 5.1 mmol/L    Chloride 106 101 - 110 mmol/L    CO2 25 21 - 32 mmol/L    Anion Gap 8 2 - 11 mmol/L    Glucose 126 (H) 65 - 100 mg/dL    BUN 12 6 - 23 MG/DL    Creatinine 0.70 0.6 - 1.0 MG/DL    Est, Glom Filt Rate >60 >60 ml/min/1.73m2    Calcium 9.2 8.3 - 10.4 MG/DL    Total Bilirubin 0.6 0.2 - 1.1 MG/DL    ALT 17 12 - 65 U/L    AST 12 (L) 15 - 37 U/L    Alk Phosphatase 59 50 - 136 U/L    Total Protein 7.1 6.3 - 8.2 g/dL    Albumin 3.7 3.5 - 5.0 g/dL    Globulin 3.4 2.8 - 4.5 g/dL    Albumin/Globulin Ratio 1.1 0.4 - 1.6     CEA    Collection Time: 12/12/22  8:42 AM   Result Value Ref Range    CEA 1.3 0.0 - 3.0 ng/mL   Magnesium    Collection Time: 12/12/22  8:42 AM   Result Value Ref Range    Magnesium 2.2 1.8 - 2.4 mg/dL       Imaging:  No results found for this or any previous visit. ASSESSMENT/PLAN:   Diagnosis Orders   1. Rectal cancer ([facility] [address]        2. Cancer related pain  traMADol (ULTRAM) 50 MG tablet      3. High risk medication use        4. Chemotherapy-induced neutropenia ([facility] [address]                      49 y.o. F urgently consulted for rectal carcinoma.   She had good general baseline health, developed frequency of bowel movement and blood in stool for 2 months, colonoscopy showed low rectal adenocarcinoma and urgently consulted to oncology, discussed carcinogenesis and staging, urgent arrangement of EUS reported T3N0 disease but quite large avid disease on PET with small lymph nodes, discussed at tumor board and concern of level of local invasion, arrange rectal MRI, discussed neoadjuvant treatment options with patient and wife and desired to treat aggressively for maximal chance of CR, arrange JENNIFER in the manner of Prodige 23 (Neoadjuvant rectal cancer treatment with JENNIFER using three months of modified FOLFIRINOX (oxaliplatin 85 mg/m2, leucovorin 400 mg/m2, irinotecan 180 mg/m2 day 1, and FU 2400 mg/m2 over 46 hours every 14 days) followed by long-course CRT (50 Gy in 25 fractions plus concurrent capecitabine), TME, and three additional months of FOLFOX or capecitabine), we discussed toxicities and management, arrange port placement, antiemetics, Emla, IV iron, rectal MRI confirmed chief receive M0 disease, proceed to cycle 1 neoadjuvant FOLFIRINOX 8/11/2022, generally tolerated well, 4 bowel movements a day but not watery, there was left lower quadrant pain, nausea, discussed management accordingly, labs reviewed and CEA down, signs of clinical response but complaining of increased fatigue, increased neuropathy, thrombocytopenic, discussed the risk of chronic neuropathy and patient desired to continue and reduce oxaliplatin to 60 mg/m2 from cycle 5, return on 10/19/2022 and reporting numbness in feet persistent, received cycle 6 without oxaliplatin, plan to start chemoradiation next month and coordinate with radiation oncology, discussed she has generally tolerated well with wonderful clinical response and CEA normalized, we discussed that adjuvant chemotherapy is part of the plan per protocol but will address her desire and concern after surgery, return on 11/14/2022 labs reviewed, proceed to Xeloda radiation, feet numbness mostly recovered and continue to monitor, again discussed left lower quadrant pain developed seemed chronically fluctuating before cancer was diagnosed, which may be related with pelvic inflammation or pelvic endometriosis and I doubt related to cancer symptom, she will discuss with Dr. Haddad Standing to explore in surgery, return on 12/12/2022, platelet 89 and ANC down to 1.1, worsening pain at defecation, Proctofoam not enough for pain control, add tramadol, proceed to week 5 Xeloda radiation, reduce Xeloda to 1000 mg twice daily for neutropenia, follow next week but call as needed. All questions are answered to their satisfaction. They will call for further questions and concerns. ECOG PERFORMANCE STATUS - 0-Fully active, able to carry on all pre-disease performance without restriction. Pain - 3/10. None/Minimal pain - not affecting QOL     Fatigue - No flowsheet data found. Distress - No flowsheet data found. Elements of this note have been dictated via voice recognition software. Text and phrases may be limited by the accuracy and autoconversion of the software. The chart has been reviewed, but errors may still be present. Lizet Elena M.D.   [address]  Office : [phone]  Fax : [phone]

## 2022-12-13 ENCOUNTER — HOSPITAL ENCOUNTER (OUTPATIENT)
Dept: RADIATION ONCOLOGY | Age: 47
Setting detail: RECURRING SERIES
Discharge: HOME | End: 2022-12-16
Payer: COMMERCIAL

## 2022-12-13 PROCEDURE — 77386 HC NTSTY MODUL RAD TX DLVR CPLX: CPT

## 2022-12-14 ENCOUNTER — HOSPITAL ENCOUNTER (OUTPATIENT)
Dept: RADIATION ONCOLOGY | Age: 47
Setting detail: RECURRING SERIES
Discharge: HOME | End: 2022-12-17
Payer: COMMERCIAL

## 2022-12-14 PROCEDURE — 77386 HC NTSTY MODUL RAD TX DLVR CPLX: CPT

## 2022-12-15 ENCOUNTER — HOSPITAL ENCOUNTER (OUTPATIENT)
Dept: RADIATION ONCOLOGY | Age: 47
Setting detail: RECURRING SERIES
Discharge: HOME | End: 2022-12-18
Payer: COMMERCIAL

## 2022-12-15 PROCEDURE — 77336 RADIATION PHYSICS CONSULT: CPT

## 2022-12-15 PROCEDURE — 77386 HC NTSTY MODUL RAD TX DLVR CPLX: CPT

## 2022-12-16 ENCOUNTER — HOSPITAL ENCOUNTER (OUTPATIENT)
Dept: RADIATION ONCOLOGY | Age: 47
Setting detail: RECURRING SERIES
Discharge: HOME | End: 2022-12-19
Payer: COMMERCIAL

## 2022-12-16 VITALS
SYSTOLIC BLOOD PRESSURE: 119 MMHG | TEMPERATURE: 98.2 F | OXYGEN SATURATION: 100 % | DIASTOLIC BLOOD PRESSURE: 80 MMHG | HEART RATE: 88 BPM

## 2022-12-16 PROCEDURE — 77386 HC NTSTY MODUL RAD TX DLVR CPLX: CPT

## 2022-12-16 ASSESSMENT — PAIN DESCRIPTION - LOCATION: LOCATION: RECTUM

## 2022-12-16 ASSESSMENT — PAIN SCALES - GENERAL: PAINLEVEL_OUTOF10: 5

## 2022-12-16 ASSESSMENT — PAIN DESCRIPTION - DESCRIPTORS: DESCRIPTORS: SHARP;BURNING

## 2022-12-16 NOTE — PROGRESS NOTES
RADIATION ONCOLOGY ON-TREATMENT VISIT  12/16/22    Visit today was conducted in [facility] using video . Diagnosis:  Cancer Staging  Rectal cancer [facility])  Staging form: Colon And Rectum, AJCC 8th Edition  - Clinical: Stage IIA (cT3, cN0, cM0) - Signed by Maura William MD on 10/12/2022    This is a 49 y.o. female who is currently receiving chemoRT as part of JENNIFER. Current RT dose: 39.6/45 Gy in 22/25 fractions. Boost of 5.4 Gy in 3 fx to follow. Missed Monday's treatment this week due to machine malfunction. Concurrent systemic therapy:  Xeloda    Subjective:  She continues to have a lot of rectal pain. Only taking Tylenol and using pramoxine-hydrocortisone cream.  5 BM daily, first two are regular, rest are blood clots and mucous. Objective:  Vitals:    12/16/22 0849   BP: 119/80   Pulse: 88   Temp: 98.2 °F (36.8 °C)   TempSrc: Oral   SpO2: 100%     Pain 10/10     General: Alert and conversant, appears uncomfortable due to pain  Abdomen: Non-distended    Assessment:  Patient is tolerating radiation therapy fairly well with grade 2 proctitis. Plan:  -Low back soreness: resolved.   -Nausea: controlled with Zofran prn.   -Rectal pain: continue Proctofoam/Anusol ointment. Strongly encouraged her to start tramadol prescribed by Dr. Cecile Junior, she is very hesitant to take pain meds. Anusol suppositories were too expensive.    -Loose stools/Diarrhea: Imodium prn.   -Continue RT as planned. BID one day next week to make up missed day this week. -Treatment images reviewed. -Concurrent Xeloda as per Dr. Cecile Junior. WBC lower this week, ok to continue RT. Not anemic.   -The patient has a documented plan of care to address pain. As above.         Maura William MD  12/16/22

## 2022-12-19 ENCOUNTER — HOSPITAL ENCOUNTER (OUTPATIENT)
Dept: LAB | Age: 47
Discharge: HOME | End: 2022-12-22
Payer: COMMERCIAL

## 2022-12-19 ENCOUNTER — OFFICE VISIT (OUTPATIENT)
Dept: ONCOLOGY | Age: 47
End: 2022-12-19
Payer: COMMERCIAL

## 2022-12-19 ENCOUNTER — HOSPITAL ENCOUNTER (OUTPATIENT)
Dept: RADIATION ONCOLOGY | Age: 47
Setting detail: RECURRING SERIES
Discharge: HOME | End: 2022-12-22
Payer: COMMERCIAL

## 2022-12-19 VITALS
HEIGHT: 61 IN | OXYGEN SATURATION: 98 % | WEIGHT: 120.5 LBS | TEMPERATURE: 97.8 F | RESPIRATION RATE: 16 BRPM | SYSTOLIC BLOOD PRESSURE: 98 MMHG | BODY MASS INDEX: 22.75 KG/M2 | DIASTOLIC BLOOD PRESSURE: 70 MMHG | HEART RATE: 96 BPM

## 2022-12-19 DIAGNOSIS — Z79.899 HIGH RISK MEDICATION USE: ICD-10-CM

## 2022-12-19 DIAGNOSIS — C20 RECTAL CANCER (HCC): ICD-10-CM

## 2022-12-19 DIAGNOSIS — I95.9 HYPOTENSION, UNSPECIFIED HYPOTENSION TYPE: ICD-10-CM

## 2022-12-19 DIAGNOSIS — C20 RECTAL CANCER (HCC): Primary | ICD-10-CM

## 2022-12-19 DIAGNOSIS — G89.3 CANCER RELATED PAIN: ICD-10-CM

## 2022-12-19 LAB
ALBUMIN SERPL-MCNC: 3.7 G/DL (ref 3.5–5)
ALBUMIN/GLOB SERPL: 1 {RATIO} (ref 0.4–1.6)
ALP SERPL-CCNC: 63 U/L (ref 50–136)
ALT SERPL-CCNC: 18 U/L (ref 12–65)
ANION GAP SERPL CALC-SCNC: 6 MMOL/L (ref 2–11)
AST SERPL-CCNC: 10 U/L (ref 15–37)
BASOPHILS # BLD: 0 K/UL (ref 0–0.2)
BASOPHILS NFR BLD: 1 % (ref 0–2)
BILIRUB SERPL-MCNC: 0.3 MG/DL (ref 0.2–1.1)
BUN SERPL-MCNC: 13 MG/DL (ref 6–23)
CALCIUM SERPL-MCNC: 9.7 MG/DL (ref 8.3–10.4)
CEA SERPL-MCNC: 1.2 NG/ML (ref 0–3)
CHLORIDE SERPL-SCNC: 106 MMOL/L (ref 101–110)
CO2 SERPL-SCNC: 27 MMOL/L (ref 21–32)
CREAT SERPL-MCNC: 0.7 MG/DL (ref 0.6–1)
DIFFERENTIAL METHOD BLD: ABNORMAL
EOSINOPHIL # BLD: 0.1 K/UL (ref 0–0.8)
EOSINOPHIL NFR BLD: 3 % (ref 0.5–7.8)
ERYTHROCYTE [DISTWIDTH] IN BLOOD BY AUTOMATED COUNT: 12.3 % (ref 11.9–14.6)
GLOBULIN SER CALC-MCNC: 3.8 G/DL (ref 2.8–4.5)
GLUCOSE SERPL-MCNC: 124 MG/DL (ref 65–100)
HCT VFR BLD AUTO: 35.6 % (ref 35.8–46.3)
HGB BLD-MCNC: 12.3 G/DL (ref 11.7–15.4)
IMM GRANULOCYTES # BLD AUTO: 0.1 K/UL (ref 0–0.5)
IMM GRANULOCYTES NFR BLD AUTO: 4 % (ref 0–5)
LYMPHOCYTES # BLD: 0.4 K/UL (ref 0.5–4.6)
LYMPHOCYTES NFR BLD: 13 % (ref 13–44)
MCH RBC QN AUTO: 32.6 PG (ref 26.1–32.9)
MCHC RBC AUTO-ENTMCNC: 34.6 G/DL (ref 31.4–35)
MCV RBC AUTO: 94.4 FL (ref 82–102)
MONOCYTES # BLD: 0.3 K/UL (ref 0.1–1.3)
MONOCYTES NFR BLD: 12 % (ref 4–12)
NEUTS SEG # BLD: 1.8 K/UL (ref 1.7–8.2)
NEUTS SEG NFR BLD: 67 % (ref 43–78)
NRBC # BLD: 0 K/UL (ref 0–0.2)
PLATELET # BLD AUTO: 154 K/UL (ref 150–450)
PMV BLD AUTO: 10 FL (ref 9.4–12.3)
POTASSIUM SERPL-SCNC: 3.8 MMOL/L (ref 3.5–5.1)
PROT SERPL-MCNC: 7.5 G/DL (ref 6.3–8.2)
RBC # BLD AUTO: 3.77 M/UL (ref 4.05–5.2)
SODIUM SERPL-SCNC: 139 MMOL/L (ref 133–143)
WBC # BLD AUTO: 2.7 K/UL (ref 4.3–11.1)

## 2022-12-19 PROCEDURE — 36415 COLL VENOUS BLD VENIPUNCTURE: CPT

## 2022-12-19 PROCEDURE — 80053 COMPREHEN METABOLIC PANEL: CPT

## 2022-12-19 PROCEDURE — 77386 HC NTSTY MODUL RAD TX DLVR CPLX: CPT

## 2022-12-19 PROCEDURE — 85025 COMPLETE CBC W/AUTO DIFF WBC: CPT

## 2022-12-19 PROCEDURE — 82378 CARCINOEMBRYONIC ANTIGEN: CPT

## 2022-12-19 PROCEDURE — 99215 OFFICE O/P EST HI 40 MIN: CPT | Performed by: INTERNAL MEDICINE

## 2022-12-19 RX ORDER — HYDROCODONE BITARTRATE AND ACETAMINOPHEN 5; 325 MG/1; MG/1
1 TABLET ORAL EVERY 6 HOURS PRN
Qty: 30 TABLET | Refills: 0 | Status: SHIPPED | OUTPATIENT
Start: 2022-12-19 | End: 2022-12-22

## 2022-12-19 RX ORDER — PRAMOXINE HYDROCHLORIDE 10 MG/G
AEROSOL, FOAM TOPICAL EVERY 4 HOURS PRN
Qty: 1 EACH | Refills: 2 | Status: SHIPPED | OUTPATIENT
Start: 2022-12-19

## 2022-12-19 NOTE — PATIENT INSTRUCTIONS
Patient Instructions from Today's Visit    Reason for Visit:  Follow up visit for rectal cancer      Plan:  Continue radiation and Xeloda radiation 23 of 28  We will send you Norco for pain  We will also send new Proctofoam gel for anal area pain  Refer to Dr Milagro Hoffman for surgery    Follow Up:  -as scheduled    Recent Lab Results:  Hospital Outpatient Visit on 12/19/2022   Component Date Value Ref Range Status    WBC 12/19/2022 2.7 (A)  4.3 - 11.1 K/uL Final    RBC 12/19/2022 3.77 (A)  4.05 - 5.2 M/uL Final    Hemoglobin 12/19/2022 12.3  11.7 - 15.4 g/dL Final    Hematocrit 12/19/2022 35.6 (A)  35.8 - 46.3 % Final    MCV 12/19/2022 94.4  82.0 - 102.0 FL Final    MCH 12/19/2022 32.6  26.1 - 32.9 PG Final    MCHC 12/19/2022 34.6  31.4 - 35.0 g/dL Final    RDW 12/19/2022 12.3  11.9 - 14.6 % Final    Platelets 30/85/2649 154  150 - 450 K/uL Final    MPV 12/19/2022 10.0  9.4 - 12.3 FL Final    nRBC 12/19/2022 0.00  0.0 - 0.2 K/uL Final    **Note: Absolute NRBC parameter is now reported with Hemogram**    Seg Neutrophils 12/19/2022 67  43 - 78 % Final    Lymphocytes 12/19/2022 13  13 - 44 % Final    Monocytes 12/19/2022 12  4.0 - 12.0 % Final    Eosinophils % 12/19/2022 3  0.5 - 7.8 % Final    Basophils 12/19/2022 1  0.0 - 2.0 % Final    Immature Granulocytes 12/19/2022 4  0.0 - 5.0 % Final    Segs Absolute 12/19/2022 1.8  1.7 - 8.2 K/UL Final    Absolute Lymph # 12/19/2022 0.4 (A)  0.5 - 4.6 K/UL Final    Absolute Mono # 12/19/2022 0.3  0.1 - 1.3 K/UL Final    Absolute Eos # 12/19/2022 0.1  0.0 - 0.8 K/UL Final    Basophils Absolute 12/19/2022 0.0  0.0 - 0.2 K/UL Final    Absolute Immature Granulocyte 12/19/2022 0.1  0.0 - 0.5 K/UL Final    Differential Type 12/19/2022 AUTOMATED    Final    Sodium 12/19/2022 139  133 - 143 mmol/L Final    Potassium 12/19/2022 3.8  3.5 - 5.1 mmol/L Final    Chloride 12/19/2022 106  101 - 110 mmol/L Final    CO2 12/19/2022 27  21 - 32 mmol/L Final    Anion Gap 12/19/2022 6  2 - 11 mmol/L Final Glucose 12/19/2022 124 (A)  65 - 100 mg/dL Final    BUN 12/19/2022 13  6 - 23 MG/DL Final    Creatinine 12/19/2022 0.70  0.6 - 1.0 MG/DL Final    EstNiles Filt Rate 12/19/2022 >60  >60 ml/min/1.73m2 Final    Comment:      Pediatric calculator link: [employer].at. org/professionals/kdoqi/gfr_calculatorped       These results are not intended for use in patients <25years of age. eGFR results are calculated without a race factor using  the 2021 CKD-EPI equation. Careful clinical correlation is recommended, particularly when comparing to results calculated using previous equations. The CKD-EPI equation is less accurate in patients with extremes of muscle mass, extra-renal metabolism of creatinine, excessive creatine ingestion, or following therapy that affects renal tubular secretion. Calcium 12/19/2022 9.7  8.3 - 10.4 MG/DL Final    Total Bilirubin 12/19/2022 0.3  0.2 - 1.1 MG/DL Final    ALT 12/19/2022 18  12 - 65 U/L Final    AST 12/19/2022 10 (A)  15 - 37 U/L Final    Alk Phosphatase 12/19/2022 63  50 - 136 U/L Final    Total Protein 12/19/2022 7.5  6.3 - 8.2 g/dL Final    Albumin 12/19/2022 3.7  3.5 - 5.0 g/dL Final    Globulin 12/19/2022 3.8  2.8 - 4.5 g/dL Final    Albumin/Globulin Ratio 12/19/2022 1.0  0.4 - 1.6   Final    CEA 12/19/2022 1.2  0.0 - 3.0 ng/mL Final    Comment: Nonsmoker:  <3.0 ng/mL  Smoker:     <5.0 ng/mL  [facility]. Patient's results of tumor marker testing may not be comparable to labs using different manufacturers/methods.           Treatment Summary has been discussed and given to patient: n/a        -------------------------------------------------------------------------------------------------------------------  Please call our office at [phone] if you have any  of the following symptoms:   Fever of 100.5 or greater  Chills  Shortness of breath  Swelling or pain in one leg    After office hours an answering service is available and will contact a provider for emergencies or if you are experiencing any of the above symptoms. Patient did express an interest in My Chart. My Chart log in information explained on the after visit summary printout at the [facility] desk.     Anusha Langford RN  Nurse Navigator  [address] [place] [id number] [phone]

## 2022-12-19 NOTE — PROGRESS NOTES
[facility] Hematology & Oncology: Office Visit Progress Note    Chief Complaint:    Rectal cancer    History of Present Illness:  Reason for Referral: Rectal cancer     Referring Provider:  Vianca Remy MD     Primary Care Provider: Phyllis Wolff DO     Family History of Cancer/Hematologic Disorders: None noted     Presenting Symptoms: LLQ abdominal pain     Ms. Jeevan Abel is a 49 y.o.  female who reports to have never used tobacco products. She denies use of alcohol or drug substances. Her medical history reports as rectal bleeding. Her surgical history reports as  and colonoscopy. On 7/15/22, Ms. Jeevan Abel saw Dr Maggie Layne for her newly diagnosed rectal cancer. She reported to have had rectal bleeding for 2 months which prompted her first colonoscopy. A 5 cm half circumferential friable mass was identified at the most proximal valve of [place], biopsy revealed at least carcinoma in situ. 2022 CT scan of c/a/p demonstrated no distal metastases. She reported intermittent left lower quadrant abdominal pain for years. She has EUS scheduled for August however Dr Maggie Layne reached out to Dr Kriss Rosenthal to move up her planned EUS. A referral was placed to hematology for consideration of neoadjuvant chemoradiation. Currently there is no referral to radiation oncology. Her 2022 CBC and CMP reported WNL except for a low creatinine of 0.47.     22 Colonoscopy         2022 Surgical Pathology  DIAGNOSIS  A:  \"RECTAL MASS BIOPSIES\":        FRAGMENTS OF AT LEAST IN SITU ADENOCARCINOMA. SEE COMMENT. COMMENT: BECAUSE BIOPSY FRAGMENTS ARE LIMITED PRIMARIALY TO MUCOSA,   SUBMUCOSAL INFILTRATION CANNOT BE RULED OUT   B:  \"RANDOM COLON BIOPSIES\":        FRAGMENTS OF HISTOLOGICALLY UNREMARKABLE LARGE INTESTINE   C:  \"ASCENDING COLON POLYP\":        FRAGMENT OF MIXED TUBULOVILLOUS ADENOMA     22 CT Chest Abdomen and Pelvis  FINDINGS:  Lungs: No pulmonary nodules. No airspace disease.   Airways: Trachea and proximal bronchi grossly patent. Pleura: No effusion or thickening or calcifications. Lymph Nodes: No enlarged axillary, hilar or mediastinal lymph nodes. Heart: Normal size. Coronaries: No definite calcifications. Aorta: Normal caliber. Esophagus: Thickening of the distal esophagus  Skeletal/Chest Wall: No gross bony lesions. Liver: A 4 mm cyst anterior segment right lobe, axial image 47, otherwise  unremarkable. Gallbladder: No gallstones identified  Bile Ducts: Not dilated. Pancreas: Unremarkable. Spleen: Uniform and normal size. Stomach: Unremarkable. Bowel: There is mural thickening of the rectum, probably a cyst 6 cm in length. Margins are indistinct. Small nodular densities probably represent mesorectal  node. Adrenals: Are normal size. Kidneys/Ureters: Enhance symmetrically. No hydronephrosis. Lymph Nodes: No grossly enlarged retroperitoneal, mesenteric, or pelvic  adenopathy. Vasculature: Aorta is normal caliber. Bones: No gross bony lesions. Other: No ascites. Impression  Mural thickening of the rectum suspicious for neoplasm. Margins are  indistinct. Probable small mesorectal lymph nodes. There is one small cyst in  liver. No pulmonary nodules. PET [id number]:  1. Hypermetabolic rectal mass consistent with known rectal malignancy. No   evidence of metastatic disease in the neck, chest, abdomen or pelvis. Small   perirectal lymph nodes are present but may be too small to accurately   characterize by PET imaging. No FDG activity noted in these nodes. Review of Systems:  Constitutional Fatigue. Denies fever or chills. Denies weight loss or appetite changes. Denies anorexia. HEENT Denies trauma, bluring vision, hearing loss, ear pain, nosebleeds, sore throat, neck pain and ear discharge. Skin Denies lesions or rashes. Lungs Denies shortness of breath, cough, sputum production or hemoptysis. Cardiovascular Denies chest pain, palpitations, orthopnea, claudication and leg swelling. Gastrointestinal LLQ pain. Blood in stool resolved. Nausea controlled. Denies vomiting.  Denies dysuria, frequency or hesitancy of urination   Neuro Peripheral neuropathy. Denies headaches, visual changes or ataxia. Denies dizziness, tingling, tremors,  speech change, focal weakness and headaches. Hematology Denies nasal/gum bleeding, denies easy bruise   Endo Denies heat/cold intolerance, denies diabetes. MSK Denies back pain, swollen legs, myalgias and falls. Psychiatric/Behavioral Denies depression and substance abuse. The patient is not nervous/anxious.        Allergies   Allergen Reactions    Amoxicillin Rash     Past Medical History:   Diagnosis Date    Cancer ([facility] [address]     rectal cancer-- dx 2022---- followed by dr Cornelia Osorio-- per spouse after scans/test  then will determine what the plan is    Rectal bleeding     onset 2022     Past Surgical History:   Procedure Laterality Date     SECTION      COLONOSCOPY      COLONOSCOPY N/A 2022    COLONOSCOPY ULTRASOUND/ BMI 19 performed by Ivanna Chaney MD at [facility] ENDOSCOPY    IR PORT PLACEMENT EQUAL OR GREATER THAN 5 YEARS  2022    IR PORT PLACEMENT EQUAL OR GREATER THAN 5 YEARS 2022 [facility] SPECIALS     Family History   Problem Relation Age of Onset    High Blood Pressure Mother      Social History     Socioeconomic History    Marital status:      Spouse name: Not on file    Number of children: Not on file    Years of education: Not on file    Highest education level: Not on file   Occupational History    Not on file   Tobacco Use    Smoking status: Never    Smokeless tobacco: Never   Vaping Use    Vaping Use: Never used   Substance and Sexual Activity    Alcohol use: Yes     Comment: rare    Drug use: Never    Sexual activity: Not on file   Other Topics Concern    Not on file   Social History Narrative    Not on file     Social Determinants of Health     Financial Resource Strain: Not on file   Food Insecurity: Not on file   Transportation Needs: Not on file   Physical Activity: Not on file   Stress: Not on file   Social Connections: Not on file   Intimate Partner Violence: Not on file   Housing Stability: Not on file     Current Outpatient Medications   Medication Sig Dispense Refill    ZINC OXIDE, TOPICAL, 10 % CREA Apply 50 g topically as needed (for rectal area) 1 each 3    HYDROcodone-acetaminophen (NORCO) 5-325 MG per tablet Take 1 tablet by mouth every 6 hours as needed for Pain for up to 3 days. Intended supply: 3 days. Take lowest dose possible to manage pain 30 tablet 0    pramoxine HCl (PROCTOFOAM) 1 % foam Place around the anus every 4 hours as needed for Hemorrhoids 1 each 2    traMADol (ULTRAM) 50 MG tablet Take 1 tablet by mouth every 6 hours as needed for Pain for up to 14 days. Intended supply: 7 days. Take lowest dose possible to manage pain 28 tablet 0    hydrocortisone (ANUSOL-HC) 25 MG suppository Place 1 suppository rectally 2 times daily As needed for rectal pain 24 suppository 2    hydrocortisone (ANUSOL-HC) 2.5 % CREA rectal cream Apply to anal area twice daily 28 g 3    pramoxine-hydrocortisone 1-2.5 % cream Apply topically 3 times daily. 1 each 1    capecitabine (XELODA) 500 MG chemo tablet 1300 mg (2- 500 mg tabs and 2- 150 mg tabs) BID Monday - Friday, radiation days only. This is for first 4 weeks of radiation. 80 tablet 0    prochlorperazine (COMPAZINE) 10 MG tablet Take 1 tablet by mouth every 6 hours as needed (nausea or vomiting) 120 tablet 2    ondansetron (ZOFRAN) 4 MG tablet Take 1 tablet by mouth every 8 hours as needed for Nausea or Vomiting 90 tablet 2    lidocaine-prilocaine (EMLA) 2.5-2.5 % cream Apply topically as needed. Place small amount to port site 45 minutes prior to blood draws and/or infusion. Cover with plastic wrap 30 g 2    capecitabine (XELODA) 150 MG chemo tablet 1300 mg (2- 500 mg tabs and 2- 150 mg tabs) BID Monday - Friday, radiation days only.   This is for first 4 weeks of radiation. (Patient not taking: No sig reported) 80 tablet 0    capecitabine (XELODA) 150 MG chemo tablet 1300 mg (2- 500 mg tabs and 2- 150 mg tabs) BID Monday - Friday, radiation days only. This is for last 2 weeks of radiation. (Patient not taking: No sig reported) 40 tablet 0    capecitabine (XELODA) 500 MG chemo tablet 1300 mg (2- 500 mg tabs and 2- 150 mg tabs) BID Monday - Friday, radiation days only. This is for last 2 weeks of radiation. (Patient not taking: No sig reported) 40 tablet 0     No current facility-administered medications for this visit. Facility-Administered Medications Ordered in Other Visits   Medication Dose Route Frequency Provider Last Rate Last Admin    sodium chloride flush 0.9 % injection 5-40 mL  5-40 mL IntraVENous PRN Shira Booth MD   10 mL at 09/24/22 1644       OBJECTIVE:  BP 98/70 (Site: Right Upper Arm, Position: Standing, Cuff Size: Medium Adult)   Pulse 96   Temp 97.8 °F (36.6 °C) (Oral)   Resp 16   Ht 5' 1\" (1.549 m)   Wt 120 lb 8 oz (54.7 kg)   SpO2 98%   BMI 22.77 kg/m²     Physical Exam:  Constitutional: Oriented to person, place, and time. Well-developed and well-nourished. HEENT: Normocephalic and atraumatic. Oropharynx is clear and moist.   Conjunctivae and EOM are normal. Pupils are equal, round, and reactive to light. No scleral icterus. Neck supple. No JVD present. No tracheal deviation present. No thyromegaly present. Lymph node No palpable submandibular, cervical, supraclavicular, axillary and inguinal lymph nodes. Skin Warm and dry. No bruising and no rash noted. No erythema. No pallor. Respiratory Effort normal and breath sounds normal.  No respiratory distress. No wheezes. No rales. No tenderness. CVS Normal rate, regular rhythm and normal heart sounds. Exam reveals no gallop, no friction and no rub. No murmur heard. Abdomen Soft. Bowel sounds are normal. Exhibits no distension. There is no tenderness.  There is no rebound and no guarding. Neuro Normal reflexes. No cranial nerve deficit. Exhibits normal muscle tone, 5 of 5 strength of all extremities. MSK Normal range of motion in general.  No edema and no tenderness.    Psych Normal mood, affect, behavior, judgment and thought content      Labs:  Recent Results (from the past 24 hour(s))   CBC with Auto Differential    Collection Time: 12/19/22  8:55 AM   Result Value Ref Range    WBC 2.7 (L) 4.3 - 11.1 K/uL    RBC 3.77 (L) 4.05 - 5.2 M/uL    Hemoglobin 12.3 11.7 - 15.4 g/dL    Hematocrit 35.6 (L) 35.8 - 46.3 %    MCV 94.4 82.0 - 102.0 FL    MCH 32.6 26.1 - 32.9 PG    MCHC 34.6 31.4 - 35.0 g/dL    RDW 12.3 11.9 - 14.6 %    Platelets 350 234 - 274 K/uL    MPV 10.0 9.4 - 12.3 FL    nRBC 0.00 0.0 - 0.2 K/uL    Seg Neutrophils 67 43 - 78 %    Lymphocytes 13 13 - 44 %    Monocytes 12 4.0 - 12.0 %    Eosinophils % 3 0.5 - 7.8 %    Basophils 1 0.0 - 2.0 %    Immature Granulocytes 4 0.0 - 5.0 %    Segs Absolute 1.8 1.7 - 8.2 K/UL    Absolute Lymph # 0.4 (L) 0.5 - 4.6 K/UL    Absolute Mono # 0.3 0.1 - 1.3 K/UL    Absolute Eos # 0.1 0.0 - 0.8 K/UL    Basophils Absolute 0.0 0.0 - 0.2 K/UL    Absolute Immature Granulocyte 0.1 0.0 - 0.5 K/UL    Differential Type AUTOMATED     Comprehensive Metabolic Panel    Collection Time: 12/19/22  8:55 AM   Result Value Ref Range    Sodium 139 133 - 143 mmol/L    Potassium 3.8 3.5 - 5.1 mmol/L    Chloride 106 101 - 110 mmol/L    CO2 27 21 - 32 mmol/L    Anion Gap 6 2 - 11 mmol/L    Glucose 124 (H) 65 - 100 mg/dL    BUN 13 6 - 23 MG/DL    Creatinine 0.70 0.6 - 1.0 MG/DL    Est, Glom Filt Rate >60 >60 ml/min/1.73m2    Calcium 9.7 8.3 - 10.4 MG/DL    Total Bilirubin 0.3 0.2 - 1.1 MG/DL    ALT 18 12 - 65 U/L    AST 10 (L) 15 - 37 U/L    Alk Phosphatase 63 50 - 136 U/L    Total Protein 7.5 6.3 - 8.2 g/dL    Albumin 3.7 3.5 - 5.0 g/dL    Globulin 3.8 2.8 - 4.5 g/dL    Albumin/Globulin Ratio 1.0 0.4 - 1.6     CEA    Collection Time: 12/19/22  8:55 AM   Result Value Ref Range    CEA 1.2 0.0 - 3.0 ng/mL       Imaging:  No results found for this or any previous visit. ASSESSMENT/PLAN:   Diagnosis Orders   1. Rectal cancer [facility] Surgical (Surgical Oncology)    pramoxine HCl (PROCTOFOAM) 1 % foam      2. Cancer related pain  HYDROcodone-acetaminophen (NORCO) 5-325 MG per tablet      3. Hypotension, unspecified hypotension type        4. High risk medication use                        49 y.o. F urgently consulted for rectal carcinoma.   She had good general baseline health, developed frequency of bowel movement and blood in stool for 2 months, colonoscopy showed low rectal adenocarcinoma and urgently consulted to oncology, discussed carcinogenesis and staging, urgent arrangement of EUS reported T3N0 disease but quite large avid disease on PET with small lymph nodes, discussed at tumor board and concern of level of local invasion, arrange rectal MRI, discussed neoadjuvant treatment options with patient and wife and desired to treat aggressively for maximal chance of CR, arrange JENNIFER in the manner of Prodige 23 (Neoadjuvant rectal cancer treatment with JENNIFER using three months of modified FOLFIRINOX (oxaliplatin 85 mg/m2, leucovorin 400 mg/m2, irinotecan 180 mg/m2 day 1, and FU 2400 mg/m2 over 46 hours every 14 days) followed by long-course CRT (50 Gy in 25 fractions plus concurrent capecitabine), TME, and three additional months of FOLFOX or capecitabine), we discussed toxicities and management, arrange port placement, antiemetics, Emla, IV iron, rectal MRI confirmed chief receive M0 disease, proceed to cycle 1 neoadjuvant FOLFIRINOX 8/11/2022, generally tolerated well, 4 bowel movements a day but not watery, there was left lower quadrant pain, nausea, discussed management accordingly, labs reviewed and CEA down, signs of clinical response but complaining of increased fatigue, increased neuropathy, thrombocytopenic, discussed the risk of chronic neuropathy and patient desired to continue and reduce oxaliplatin to 60 mg/m2 from cycle 5, return on 10/19/2022 and reporting numbness in feet persistent, received cycle 6 without oxaliplatin, plan to start chemoradiation next month and coordinate with radiation oncology, discussed she has generally tolerated well with wonderful clinical response and CEA normalized, we discussed that adjuvant chemotherapy is part of the plan per protocol but will address her desire and concern after surgery, return on 11/14/2022 labs reviewed, proceed to Xeloda radiation, feet numbness mostly recovered and continue to monitor, again discussed left lower quadrant pain developed seemed chronically fluctuating before cancer was diagnosed, which may be related with pelvic inflammation or pelvic endometriosis and I doubt related to cancer symptom, she will discuss with Dr. Glenna Spicer to explore in surgery, return on 12/12/2022, platelet 89 and ANC down to 1.1, worsening pain at defecation, Proctofoam not enough for pain control, add tramadol, proceed to week 5 Xeloda radiation, reduce Xeloda to 1000 mg twice daily for neutropenia and ANC 1.8, proceed to last week chemoradiation, refill Proctofoam and Norco, hydration for hypotension, see Dr. Glenna Spicer to arrange surgery, return in 2 months to follow-up postoperatively but call as needed. All questions are answered to their satisfaction. They will call for further questions and concerns. ECOG PERFORMANCE STATUS - 0-Fully active, able to carry on all pre-disease performance without restriction. Pain - 10 - Worst pain ever/10. None/Minimal pain - not affecting QOL     Fatigue - No flowsheet data found. Distress - No flowsheet data found. Elements of this note have been dictated via voice recognition software. Text and phrases may be limited by the accuracy and autoconversion of the software. The chart has been reviewed, but errors may still be present.           Emilia Jimenez, TOO [place] [address]  Office : [phone]  Fax : [phone]

## 2022-12-19 NOTE — PROGRESS NOTES
I saw patient today with Dr Jahaira Leiva. Pt will finish radiation on 12-27-22 and we will refer her today to Dr Caffie Cockayne. Pt will be given Proctofoam, Norco and Zinc cream for rectal pain. Pt wi8ll plan to return approx 8 wks from now post surgery.  Nurse navigation will be following

## 2022-12-20 PROCEDURE — 77386 HC NTSTY MODUL RAD TX DLVR CPLX: CPT

## 2022-12-21 PROCEDURE — 77386 HC NTSTY MODUL RAD TX DLVR CPLX: CPT

## 2022-12-22 ENCOUNTER — HOSPITAL ENCOUNTER (OUTPATIENT)
Dept: RADIATION ONCOLOGY | Age: 47
Setting detail: RECURRING SERIES
Discharge: HOME | End: 2022-12-25
Payer: COMMERCIAL

## 2022-12-22 ENCOUNTER — APPOINTMENT (OUTPATIENT)
Dept: RADIATION ONCOLOGY | Age: 47
End: 2022-12-22
Payer: COMMERCIAL

## 2022-12-22 PROCEDURE — 77336 RADIATION PHYSICS CONSULT: CPT

## 2022-12-22 PROCEDURE — 77386 HC NTSTY MODUL RAD TX DLVR CPLX: CPT

## 2022-12-23 ENCOUNTER — APPOINTMENT (OUTPATIENT)
Dept: RADIATION ONCOLOGY | Age: 47
End: 2022-12-23
Payer: COMMERCIAL

## 2022-12-23 ENCOUNTER — HOSPITAL ENCOUNTER (OUTPATIENT)
Dept: RADIATION ONCOLOGY | Age: 47
Setting detail: RECURRING SERIES
Discharge: HOME | End: 2022-12-26
Payer: COMMERCIAL

## 2022-12-23 VITALS
SYSTOLIC BLOOD PRESSURE: 109 MMHG | DIASTOLIC BLOOD PRESSURE: 73 MMHG | HEART RATE: 86 BPM | OXYGEN SATURATION: 97 % | WEIGHT: 119.9 LBS | TEMPERATURE: 98.3 F | BODY MASS INDEX: 22.65 KG/M2

## 2022-12-23 PROCEDURE — 77386 HC NTSTY MODUL RAD TX DLVR CPLX: CPT

## 2022-12-23 ASSESSMENT — PAIN SCALES - GENERAL: PAINLEVEL_OUTOF10: 10

## 2022-12-23 ASSESSMENT — PAIN DESCRIPTION - LOCATION: LOCATION: RECTUM

## 2022-12-24 NOTE — PROGRESS NOTES
RADIATION ONCOLOGY ON-TREATMENT VISIT  12/23/22    Visit today was conducted in [facility] using video . Diagnosis:  Cancer Staging  Rectal cancer [facility])  Staging form: Colon And Rectum, AJCC 8th Edition  - Clinical: Stage IIA (cT3, cN0, cM0) - Signed by Rudy Horowitz MD on 10/12/2022    This is a 49 y.o. female who is currently receiving chemoRT as part of JENNIFER. Current RT dose: 45/45 Gy in 25/25 fractions. 5.4/5.4 Gy in 3/3 fx of boost.    Concurrent systemic therapy:  Xeloda    Subjective:  Rectal pain persists, taking Tylenol and using pramoxine-hydrocortisone cream.  Does not want to take narcotic pain meds. Down to 1 BM daily, less blood. Objective:  Vitals:    12/23/22 0827   BP: 109/73   Pulse: 86   Temp: 98.3 °F (36.8 °C)   TempSrc: Oral   SpO2: 97%   Weight: 119 lb 14.4 oz (54.4 kg)     Pain 10/10     General: Alert and conversant, in NAD  Rectal: External hemorrhoid, no significant dermatitis     Assessment:  Patient is tolerating radiation therapy fairly well with grade 2 proctitis. Plan:  -Nausea: controlled with Zofran prn.   -Rectal pain: continue Proctofoam/Anusol ointment. Tylenol prn, she does not want to take narcotics. -Loose stools/Diarrhea: Imodium prn.   -Completed RT today. Follow up here in 3 weeks. -Treatment images reviewed. -The patient has a documented plan of care to address pain. As above.         Rudy Horowitz MD  12/23/22

## 2022-12-26 ENCOUNTER — APPOINTMENT (OUTPATIENT)
Dept: RADIATION ONCOLOGY | Age: 47
End: 2022-12-26
Payer: COMMERCIAL

## 2022-12-27 ENCOUNTER — APPOINTMENT (OUTPATIENT)
Dept: RADIATION ONCOLOGY | Age: 47
End: 2022-12-27
Payer: COMMERCIAL

## 2023-01-06 ENCOUNTER — PREP FOR PROCEDURE (OUTPATIENT)
Dept: SURGERY | Age: 48
End: 2023-01-06

## 2023-01-06 ENCOUNTER — OFFICE VISIT (OUTPATIENT)
Dept: SURGERY | Age: 48
End: 2023-01-06
Payer: COMMERCIAL

## 2023-01-06 VITALS — BODY MASS INDEX: 22.47 KG/M2 | HEIGHT: 61 IN | WEIGHT: 119 LBS

## 2023-01-06 DIAGNOSIS — C20 RECTAL CANCER (HCC): Primary | ICD-10-CM

## 2023-01-06 PROCEDURE — 99215 OFFICE O/P EST HI 40 MIN: CPT | Performed by: SURGERY

## 2023-01-10 ENCOUNTER — HOSPITAL ENCOUNTER (OUTPATIENT)
Dept: RADIATION ONCOLOGY | Age: 48
Setting detail: RECURRING SERIES
Discharge: HOME | End: 2023-01-13

## 2023-01-10 VITALS
WEIGHT: 113 LBS | OXYGEN SATURATION: 94 % | DIASTOLIC BLOOD PRESSURE: 75 MMHG | TEMPERATURE: 98.3 F | RESPIRATION RATE: 16 BRPM | HEART RATE: 88 BPM | BODY MASS INDEX: 21.35 KG/M2 | SYSTOLIC BLOOD PRESSURE: 113 MMHG

## 2023-01-10 RX ORDER — HYDROCORTISONE ACETATE PRAMOXINE HCL 2.5; 1 G/100G; G/100G
CREAM TOPICAL 2 TIMES DAILY
COMMUNITY
Start: 2022-12-06

## 2023-01-10 ASSESSMENT — PAIN DESCRIPTION - DESCRIPTORS: DESCRIPTORS: DULL

## 2023-01-10 ASSESSMENT — PAIN DESCRIPTION - LOCATION: LOCATION: ABDOMEN

## 2023-01-10 ASSESSMENT — PAIN SCALES - GENERAL: PAINLEVEL_OUTOF10: 1

## 2023-01-10 ASSESSMENT — PAIN DESCRIPTION - ORIENTATION: ORIENTATION: LOWER;LEFT

## 2023-01-10 NOTE — PROGRESS NOTES
Follow Up:  -Rectum    Patient presents today complaining of skin irritation of the rectum and dull lower abdominal pain.      Joanne Dunbar, CMA

## 2023-01-10 NOTE — PROGRESS NOTES
RADIATION ONCOLOGY FOLLOW UP VISIT  01/10/23    Today's visit was conducted in the Mandarin language with an  via the [employer] video system. Chief Complaint: Follow up after RT    History of Present Illness:  Diagnosis  49 y.o. female with rectal cancer. Treatment  JENNIFER:  FOLFIRINOX August 2022-October 2022. ChemoRT:  50.4 Gy in 28 fx, 11/15/2022-12/23/2022. Concurrent Xeloda. Surgery tentatively scheduled for February 2023. Current symptoms/ROS:   She is doing much better but still has some pain with bowel movements. Has 2 BM daily, mostly formed. Minimal blood on toilet paper, less than she was seeing during treatment. Having left groin/suprapubic pain as well that is mild and improving. Intermittent dysuria. New imaging:  None    Meds: Reviewed. Pertinent - Anusol cream rectally daily. Physical Examination:  Vitals:    01/10/23 1330   BP: 113/75   Pulse: 88   Resp: 16   Temp: 98.3 °F (36.8 °C)   TempSrc: Oral   SpO2: 94%   Weight: 113 lb (51.3 kg)     Pain 1/10  ECOG 0-1    Well nourished, in NAD. Alert and conversant. Abdomen non-distended. Post RT skin changes around anus but well healed. Hemorrhoid still present but smaller. Assessment:  Cancer Staging  Rectal cancer [facility])  Staging form: Colon And Rectum, AJCC 8th Edition  - Clinical: Stage IIA (cT3, cN0, cM0) - Signed by Ze Alarcon MD on 10/12/2022    Now 2.5 weeks out from completion of RT. Persistent toxicity: proctitis and cystitis, improving  Disease status: To be assessed on sigmoidoscopy next month     Plan:  -To proceed to sigmoidoscopy followed by surgery with Dr. Glenna Spicer next month   -Repeat imaging as per Dr. Miranda Edmond   -Rectal and suprapubic pain: should continue to improve with more time after RT. Continue Anusol.   -Discussed vaginal dilator use to prevent stenosis   -Pt may follow up with Dr. Indra Hayes and here as needed.   -Informed pt of my upcoming departure from this clinic and that her care will be transferred to another physician upon my departure.   -The patient has a documented plan of care to address pain. Pain is controlled on current regimen.       Mega Mercedes MD  01/10/23    I spent a total of 25 minutes today performing the following care related activities for Kris Faster:  Face to face exam/evaluation, /Educate Patient/Caregivers, and Pre-Charting/Documenting after the visit

## 2023-01-19 ENCOUNTER — PREP FOR PROCEDURE (OUTPATIENT)
Dept: SURGERY | Age: 48
End: 2023-01-19

## 2023-01-19 RX ORDER — SODIUM CHLORIDE 0.9 % (FLUSH) 0.9 %
5-40 SYRINGE (ML) INJECTION PRN
Status: CANCELLED | OUTPATIENT
Start: 2023-01-19

## 2023-01-19 RX ORDER — SODIUM CHLORIDE 9 MG/ML
INJECTION, SOLUTION INTRAVENOUS PRN
Status: CANCELLED | OUTPATIENT
Start: 2023-01-19

## 2023-01-19 RX ORDER — SODIUM CHLORIDE 0.9 % (FLUSH) 0.9 %
5-40 SYRINGE (ML) INJECTION EVERY 12 HOURS SCHEDULED
Status: CANCELLED | OUTPATIENT
Start: 2023-01-19

## 2023-01-23 ENCOUNTER — PREP FOR PROCEDURE (OUTPATIENT)
Dept: SURGERY | Age: 48
End: 2023-01-23

## 2023-01-23 ENCOUNTER — HOSPITAL ENCOUNTER (OUTPATIENT)
Dept: PREADMISSION TESTING | Age: 48
Discharge: HOME | End: 2023-01-26
Payer: COMMERCIAL

## 2023-01-23 VITALS
SYSTOLIC BLOOD PRESSURE: 115 MMHG | BODY MASS INDEX: 22.23 KG/M2 | HEART RATE: 97 BPM | RESPIRATION RATE: 16 BRPM | TEMPERATURE: 97.5 F | DIASTOLIC BLOOD PRESSURE: 81 MMHG | WEIGHT: 120.8 LBS | OXYGEN SATURATION: 96 % | HEIGHT: 62 IN

## 2023-01-23 LAB
ANION GAP SERPL CALC-SCNC: 7 MMOL/L (ref 2–11)
BUN SERPL-MCNC: 14 MG/DL (ref 6–23)
CALCIUM SERPL-MCNC: 9.5 MG/DL (ref 8.3–10.4)
CHLORIDE SERPL-SCNC: 105 MMOL/L (ref 101–110)
CO2 SERPL-SCNC: 29 MMOL/L (ref 21–32)
CREAT SERPL-MCNC: 0.59 MG/DL (ref 0.6–1)
ERYTHROCYTE [DISTWIDTH] IN BLOOD BY AUTOMATED COUNT: 12.2 % (ref 11.9–14.6)
GLUCOSE SERPL-MCNC: 134 MG/DL (ref 65–100)
HCT VFR BLD AUTO: 37.4 % (ref 35.8–46.3)
HGB BLD-MCNC: 12.6 G/DL (ref 11.7–15.4)
MCH RBC QN AUTO: 32.2 PG (ref 26.1–32.9)
MCHC RBC AUTO-ENTMCNC: 33.7 G/DL (ref 31.4–35)
MCV RBC AUTO: 95.7 FL (ref 82–102)
NRBC # BLD: 0 K/UL (ref 0–0.2)
PLATELET # BLD AUTO: 124 K/UL (ref 150–450)
PMV BLD AUTO: 10.9 FL (ref 9.4–12.3)
POTASSIUM SERPL-SCNC: 3.6 MMOL/L (ref 3.5–5.1)
RBC # BLD AUTO: 3.91 M/UL (ref 4.05–5.2)
SODIUM SERPL-SCNC: 141 MMOL/L (ref 133–143)
WBC # BLD AUTO: 3.9 K/UL (ref 4.3–11.1)

## 2023-01-23 PROCEDURE — 80048 BASIC METABOLIC PNL TOTAL CA: CPT

## 2023-01-23 PROCEDURE — 85027 COMPLETE CBC AUTOMATED: CPT

## 2023-01-23 RX ORDER — SODIUM CHLORIDE 0.9 % (FLUSH) 0.9 %
5-40 SYRINGE (ML) INJECTION PRN
Status: CANCELLED | OUTPATIENT
Start: 2023-01-23

## 2023-01-23 RX ORDER — SODIUM CHLORIDE 9 MG/ML
INJECTION, SOLUTION INTRAVENOUS PRN
Status: CANCELLED | OUTPATIENT
Start: 2023-01-23

## 2023-01-23 RX ORDER — SODIUM CHLORIDE 0.9 % (FLUSH) 0.9 %
5-40 SYRINGE (ML) INJECTION EVERY 12 HOURS SCHEDULED
Status: CANCELLED | OUTPATIENT
Start: 2023-01-23

## 2023-01-23 NOTE — PROGRESS NOTES
Sigmoidoscopy 2023    Patient verified name, , and procedure. Type: 1a; abbreviated assessment per anesthesia guidelines    Labs per anesthesia: NONE    Instructed pt that they will be notified the day before their procedure by the GI Lab for time of arrival if their procedure is [place] and Pre-op for [address] cases. Arrival times should be called by 5 pm. If no phone is received the patient should contact their respective hospital. The GI lab telephone number is [phone] and ES Pre-op is [phone]. Follow diet and prep instructions per office including NPO status. If patient has NOT received instructions from office patient is advised to call surgeon office, verbalizes understanding. Bath or shower the night before and the am of surgery with non-moisturizing soap. No lotions, oils, powders, cologne on skin. No make up, eye make up or jewelry. Wear loose fitting comfortable, clean clothing. Must have adult present in building the entire time . Medications for the day of procedure NONE, patient to hold NONE per anesthesia guidelines.

## 2023-01-23 NOTE — PROGRESS NOTES
Preoperative Nutrition Screen ([facility])   Patient's Age: 49 y.o. Last Serum Albumin Level:  Lab Results   Component Value Date/Time    ANGELICA 3.7 12/19/2022 08:55 AM     Patient's BMI: Estimated body mass index is 21.35 kg/m² as calculated from the following:    Height as of 1/6/23: 5' 1\" (1.549 m). Weight as of 1/10/23: 113 lb (51.3 kg). If the answer to any of the following is Yes, then recommend prescribe Oral Nutrition Supplements (ONS) for at least 7 days prior to surgery and/or order referral to dietitian for further assessment and nutrition therapy. 1. Does the patient have a documented serum albumin less than 3.0 within the last 90 days? No = 0       2. Is patient's BMI less than 18.5 (or less than 20 if age over 72)? No = 0       3. Has the patient had an unplanned weight loss of 10% of body weight or more in the last 6 months? No = 0   4. Has the patient been eating less than 50% of their normal diet in the preceding week?  No = 0   LJ Score (number of Yes responses), 0-4 0     Plan:   No Nutrition Intervention indicated    Electronically signed by Erin Varghese RN on 1/23/23 at 2:36 PM EST

## 2023-01-23 NOTE — PROGRESS NOTES
Patient and spouse verified name and     Order for consent found in EHR and matches case posting; patient's spouse verified. Type 3 surgery, walk-in assessment complete. Labs per surgeon: NONE;   Labs per anesthesia protocol: CBC, BMP, T/S s/h dos; results pending  EKG: Not needed per anesthesia protocol     PAT also completed for Sigmoidoscopy scheduled on 2023. Hospital approved surgical skin cleanser and instructions given per hospital policy. Patient and spouse provided with and instructed on educational handouts including Guide to Surgery, Pain Management, Hand Hygiene, Blood Transfusion Education, and [facility] Brochure. Patient answered medical/surgical history questions at their best of ability. All prior to admission medications documented in [facility]. Original medication prescription bottle NOT visualized during patient appointment. Patient instructed to hold all vitamins 7 days prior to surgery and NSAIDS 5 days prior to surgery, patient verbalized understanding. Patient teach back successful and patient demonstrates knowledge of instructions. PLEASE CONTINUE TAKING ALL PRESCRIPTION MEDICATIONS UP TO THE DAY OF SURGERY UNLESS OTHERWISE DIRECTED BELOW. DISCONTINUE all vitamins and supplements 7 days prior to surgery. DISCONTINUE Non-Steriodal Anti-Inflammatory (NSAIDS) such as Advil and Aleve 5 days prior to surgery. Home Medications to take  the day of surgery               Home Medications   to Hold           Comments   Xiomara-hex shower the night before surgery and the morning of surgery. On the day before surgery please take Acetaminophen 1000mg in the morning and then again before bed. You may substitute for Tylenol 650 mg. Please do not bring home medications with you on the day of surgery unless otherwise directed by your nurse.   If you are instructed to bring home medications, please give them to your nurse as they will be administered by the nursing staff. If you have any questions, [facility] call [facility] [phone] or [address] [place] [address] [phone]. A copy of this note was provided to the patient for reference.

## 2023-01-23 NOTE — PROGRESS NOTES
Enhanced Recovery After Surgery: non-diabetic patients    Drink Ensure Surgery Immunonutrition  - one bottle twice daily for 5 days prior to surgery 01/31-02/04. Do not drink any Ensure Surgery Immunonutrition the day before surgery. Ensure Surgery Immunonutrition is the preferred formula over other Ensure formulas as it is the only one that is designed to support immune health and recovery from surgery. The night before surgery 02/05/2023, drink 2 bottles of the Ensure Pre-Surgery drink. The morning of surgery 02/06/2023, drink one bottle of the Ensure Pre-Surgery drink while on your way to the hospital. Drink this over 5-10 minutes. Drink nothing else after drinking the pre-surgical drink the morning of surgery. Bring your patient handbook with you to the hospital.      Things to remember:    1. You will be up on a chair the evening of surgery and drinking clear liquids. Your diet will be advanced by your surgeon as appropriate. 2. Beginning the day after surgery, you will be up in a chair for all meals. 3. Beginning the day after surgery, you will be out of the bed for a minimum of 6 hours (not all at one time)    4. Beginning the day after surgery, you will walk in the zurita in the zurita at least 50' at least three times a day. 5. You will be given scheduled non-narcotic pain medication to help keep your pain under control. You will have stronger pain medication ordered for break through pain. 6. All of these measures are geared toward returning your bowel to normal function as soon as possible and to prevent complications associated with bowel blockage, blood clots, and/or pneumonia.

## 2023-02-01 ENCOUNTER — ANESTHESIA EVENT (OUTPATIENT)
Dept: OPERATING ROOM | Age: 48
End: 2023-02-01
Payer: COMMERCIAL

## 2023-02-01 RX ORDER — SODIUM CHLORIDE 9 MG/ML
INJECTION, SOLUTION INTRAVENOUS PRN
Status: CANCELLED | OUTPATIENT
Start: 2023-02-01

## 2023-02-01 RX ORDER — SODIUM CHLORIDE, SODIUM LACTATE, POTASSIUM CHLORIDE, CALCIUM CHLORIDE 600; 310; 30; 20 MG/100ML; MG/100ML; MG/100ML; MG/100ML
INJECTION, SOLUTION INTRAVENOUS CONTINUOUS
Status: CANCELLED | OUTPATIENT
Start: 2023-02-01

## 2023-02-01 RX ORDER — LIDOCAINE HYDROCHLORIDE 10 MG/ML
1 INJECTION, SOLUTION INFILTRATION; PERINEURAL
Status: CANCELLED | OUTPATIENT
Start: 2023-02-01 | End: 2023-02-02

## 2023-02-01 RX ORDER — SODIUM CHLORIDE 0.9 % (FLUSH) 0.9 %
5-40 SYRINGE (ML) INJECTION EVERY 12 HOURS SCHEDULED
Status: CANCELLED | OUTPATIENT
Start: 2023-02-01

## 2023-02-01 RX ORDER — SODIUM CHLORIDE 0.9 % (FLUSH) 0.9 %
5-40 SYRINGE (ML) INJECTION PRN
Status: CANCELLED | OUTPATIENT
Start: 2023-02-01

## 2023-02-01 NOTE — PROGRESS NOTES
Unable to confirm scheduled procedure. Called patient using  service, id# [id number]. No answer, voicemail message left instructing patient to call back.

## 2023-02-02 ENCOUNTER — HOSPITAL ENCOUNTER (OUTPATIENT)
Age: 48
Setting detail: OUTPATIENT SURGERY
Discharge: HOME | End: 2023-02-02
Attending: SURGERY | Admitting: SURGERY
Payer: COMMERCIAL

## 2023-02-02 ENCOUNTER — ANESTHESIA (OUTPATIENT)
Dept: OPERATING ROOM | Age: 48
End: 2023-02-02
Payer: COMMERCIAL

## 2023-02-02 VITALS
RESPIRATION RATE: 12 BRPM | HEIGHT: 62 IN | SYSTOLIC BLOOD PRESSURE: 154 MMHG | OXYGEN SATURATION: 97 % | DIASTOLIC BLOOD PRESSURE: 76 MMHG | HEART RATE: 75 BPM | BODY MASS INDEX: 22.08 KG/M2 | TEMPERATURE: 98.7 F | WEIGHT: 120 LBS

## 2023-02-02 PROCEDURE — 7100000010 HC PHASE II RECOVERY - FIRST 15 MIN: Performed by: SURGERY

## 2023-02-02 PROCEDURE — 3609008400 HC SIGMOIDOSCOPY DIAGNOSTIC: Performed by: SURGERY

## 2023-02-02 PROCEDURE — 2709999900 HC NON-CHARGEABLE SUPPLY: Performed by: SURGERY

## 2023-02-02 ASSESSMENT — PAIN - FUNCTIONAL ASSESSMENT
PAIN_FUNCTIONAL_ASSESSMENT: NONE - DENIES PAIN
PAIN_FUNCTIONAL_ASSESSMENT: WONG-BAKER FACES
PAIN_FUNCTIONAL_ASSESSMENT: WONG-BAKER FACES
PAIN_FUNCTIONAL_ASSESSMENT: NONE - DENIES PAIN

## 2023-02-02 NOTE — PROGRESS NOTES
Pt has not arrived for scheduled procedure.  No answer, voicemail left with return phone number utilizing  service #[phone]

## 2023-02-02 NOTE — OP NOTE
[facility]  OPERATIVE REPORT    Name:  Romain Freed  MR#:  [id number]  :  1975  ACCOUNT #:  [de-identified]  DATE OF SERVICE:  2023    PREOPERATIVE DIAGNOSIS:  Rectal cancer status post chemoradiation. POSTOPERATIVE DIAGNOSIS:  Rectal cancer status post chemoradiation. PROCEDURE PERFORMED:  Flexible sigmoidoscopy. SURGEON:  Cole Plata MD    ANESTHESIA:  none. COMPLICATIONS:  none. SPECIMENS REMOVED:  none. IMPLANTS:  none. ESTIMATED BLOOD LOSS:  none. FINDINGS:  She has a circumferential lesion for about 8 to 9 cm from the anal verge and this appeared to be just proximal to the third rectal valve and presumably in the rectosigmoid area and there was already a tattoo placed distally. INDICATION:  This is a 50-year-old female. She presented with rectal cancer status post total neoadjuvant chemoradiation and surgery was offered to her and the flexible sigmoidoscopy is requested prior to the surgery for better planning. The patient understood the risks and benefits, agreed to proceed. PROCEDURE:  After informed consent was obtained, the patient was brought into the GI suite. As noted, the patient had breakfast.  We decided not to do sedation and then she agreed to proceed with the procedure without sedation. Digital rectal exam was first performed, which was normal and then a smaller scope was used to pass through the anus into the distal rectum. The scope was carefully advanced and there was some solid stool, was able to be washed out and just proximal to the rectum, a circumferential lesion was encountered and this was consistent with her previous cancer site. The distance was measured as above and a tattoo was already placed previously. The rectum appeared to be in good shape, then the scope was withdrawn. The patient tolerated the procedure well, transferred to the recovery room in stable condition.     RECOMMENDATIONS:  The patient will proceed with rectal surgery as planned.         Parker Mills MD      [id number]  D:  02/02/2023 12:28  T:  02/02/2023 13:47  JOB #:  [id number]

## 2023-02-02 NOTE — H&P
[facility]  [address] [place] [address]  [phone]      Patient:  Halley Rojas  : 1975     HPI  Halley Rojas is a 49 y.o. female who is seen today status post neoadjuvant treatments (JNENIFER followed by chemoradiation)  for a mid-rectal cancer. This patient was originally seen in July for a newly diagnosed rectal cancer on colonoscopy. She was found to have a 5 cm 1/2 circumferential friable mass in the rectum with biopsy demonstrating at least carcinoma in situ. She then underwent an EUS on 2022 which revealed a mass at about 8-13cm from the anal verge occupying 60% of the circumference, staged T3N0MX on EUS. The patient was arranged to start neoadjuvant treatment that was started in August. She completed 6 cycles of iv chemotherapy, then proceeded with chemoradiation. She completed her radiation on 2022. The patient states that at this time her bowel movements have been improving. She states that she will have some minor pain after Bms. She will have some occasional suprapubic pain as well. She is tolerating a diet. She does have some decreased appetite. She has no significant past medical history. She does not smoke or drink. She has a past surgical history of a .          Past Medical History        Past Medical History:   Diagnosis Date    Cancer [facility])       rectal cancer-- dx 2022---- followed by dr Sandy Agosto-- per spouse after scans/test  then will determine what the plan is    Rectal bleeding       onset 2022         Current Facility-Administered Medications          Current Outpatient Medications   Medication Sig Dispense Refill    ZINC OXIDE, TOPICAL, 10 % CREA Apply 50 g topically as needed (for rectal area) 1 each 3    pramoxine HCl (PROCTOFOAM) 1 % foam Place around the anus every 4 hours as needed for Hemorrhoids 1 each 2    hydrocortisone (ANUSOL-HC) 25 MG suppository Place 1 suppository rectally 2 times daily As needed for rectal pain 24 suppository 2    hydrocortisone (ANUSOL-HC) 2.5 % CREA rectal cream Apply to anal area twice daily 28 g 3    pramoxine-hydrocortisone 1-2.5 % cream Apply topically 3 times daily. 1 each 1    capecitabine (XELODA) 150 MG chemo tablet 1300 mg (2- 500 mg tabs and 2- 150 mg tabs) BID Monday - Friday, radiation days only. This is for first 4 weeks of radiation. 80 tablet 0    capecitabine (XELODA) 500 MG chemo tablet 1300 mg (2- 500 mg tabs and 2- 150 mg tabs) BID Monday - Friday, radiation days only. This is for first 4 weeks of radiation. 80 tablet 0    capecitabine (XELODA) 150 MG chemo tablet 1300 mg (2- 500 mg tabs and 2- 150 mg tabs) BID Monday - Friday, radiation days only. This is for last 2 weeks of radiation. 40 tablet 0    capecitabine (XELODA) 500 MG chemo tablet 1300 mg (2- 500 mg tabs and 2- 150 mg tabs) BID Monday - Friday, radiation days only. This is for last 2 weeks of radiation. 40 tablet 0    prochlorperazine (COMPAZINE) 10 MG tablet Take 1 tablet by mouth every 6 hours as needed (nausea or vomiting) 120 tablet 2    ondansetron (ZOFRAN) 4 MG tablet Take 1 tablet by mouth every 8 hours as needed for Nausea or Vomiting 90 tablet 2    lidocaine-prilocaine (EMLA) 2.5-2.5 % cream Apply topically as needed. Place small amount to port site 45 minutes prior to blood draws and/or infusion. Cover with plastic wrap 30 g 2      No current facility-administered medications for this visit.                 Facility-Administered Medications Ordered in Other Visits   Medication Dose Route Frequency Provider Last Rate Last Admin    sodium chloride flush 0.9 % injection 5-40 mL  5-40 mL IntraVENous PRN Roxy Gonzalez MD   10 mL at 22 1374              Allergies   Allergen Reactions    Amoxicillin Rash      Past Surgical History         Past Surgical History:   Procedure Laterality Date     SECTION        COLONOSCOPY        COLONOSCOPY N/A 2022     COLONOSCOPY ULTRASOUND/ BMI 19 performed by Erlin Olson MD at [facility] ENDOSCOPY    IR PORT PLACEMENT EQUAL OR GREATER THAN 5 YEARS   8/2/2022     IR PORT PLACEMENT EQUAL OR GREATER THAN 5 YEARS 8/2/2022 [facility] SPE[facility]         Family History         Family History   Problem Relation Age of Onset    High Blood Pressure Mother           Social History            Tobacco Use    Smoking status: Never    Smokeless tobacco: Never   Substance Use Topics    Alcohol use: Yes       Comment: rare         Review of Systems   A comprehensive review of systems was negative except for that written in the HPI.      Physical Exam  Ht 5' 1\" (1.549 m)   Wt 119 lb (54 kg)   BMI 22.48 kg/m²       General:          Alert, oriented, cooperative, awake patient in no acute distress   Skin:                Warm, moist with good texture   Eyes:               Sclera are clear, extraocular muscles intact  HENT:             Normocephalic; oral mucosa moist, nares patent; neck is supple; trachea midline  Respiratory:     Lungs clear to auscultation bilaterally, breathing is non-labored   Chest:              Symmetric throughout one respiratory excursion; no supraclavicular lymphadenopathy  CV:                  Regular rate and rhythm, no appreciable murmurs, rubs, gallops  Abdomen:        Soft, protuberant but non-distended; bowel sounds are normoactive   Extremities:     No cyanosis, clubbing or edema  Neurological:   No focal signs        Labs:         Lab Results   Component Value Date     WBC 2.7 (L) 12/19/2022     HGB 12.3 12/19/2022     HCT 35.6 (L) 12/19/2022     MCV 94.4 12/19/2022      12/19/2022            Lab Results   Component Value Date      12/19/2022     K 3.8 12/19/2022      12/19/2022     CO2 27 12/19/2022     BUN 13 12/19/2022     CREATININE 0.70 12/19/2022     GLUCOSE 124 (H) 12/19/2022     CALCIUM 9.7 12/19/2022     PROT 7.5 12/19/2022     LABALBU 3.7 12/19/2022     BILITOT 0.3 12/19/2022     ALKPHOS 63 12/19/2022 AST 10 (L) 12/19/2022     ALT 18 12/19/2022     LABGLOM >60 12/19/2022     GFRAA >60 09/21/2022     GLOB 3.8 12/19/2022         CEA:  Component Ref Range & Units 12/19/22 0855 12/12/22 0842 12/5/22 0831 11/14/22 0929 10/5/22 1115 9/21/22 1123 8/24/22 1425   CEA 0.0 - 3.0 ng/mL 1.2  1.3 CM  1.4 CM  1.4 CM  1.7 CM  2.2 CM  7.2 High  CM             CT:  CT Result (most recent):  CT CHEST ABDOMEN PELVIS W CONTRAST 07/12/2022     Narrative  CT CHEST, ABDOMEN, PELVIS WITH INTRAVENOUS CONTRAST. INDICATION: Abnormal colonoscopy. Left lower quadrant pain, diarrhea and rectal  bleeding. COMPARISON: None. TECHNIQUE:   5 mm axial scans from above the thoracic inlet to the pubic  symphysis following oral and 100 cc intravenous contrast without acute  complication. Intravenous contrast was given to increase the sensitivity to  neoplasia. Radiation dose reduction techniques were used for this study. Our CT  scanners use one or more of the following:  Automated exposure control,  adjustment of the mA and or kV according to patient size, iterative  reconstruction. FINDINGS:  Lungs: No pulmonary nodules. No airspace disease. Airways: Trachea and proximal bronchi grossly patent. Pleura: No effusion or thickening or calcifications. Lymph Nodes: No enlarged axillary, hilar or mediastinal lymph nodes. Heart: Normal size. Coronaries: No definite calcifications. Aorta: Normal caliber. Esophagus: Thickening of the distal esophagus  Skeletal/Chest Wall: No gross bony lesions. Liver: A 4 mm cyst anterior segment right lobe, axial image 47, otherwise  unremarkable. Gallbladder: No gallstones identified  Bile Ducts: Not dilated. Pancreas: Unremarkable. Spleen: Uniform and normal size. Stomach: Unremarkable. Bowel: There is mural thickening of the rectum, probably a cyst 6 cm in length. Margins are indistinct. Small nodular densities probably represent mesorectal  node.      Adrenals: Are normal size.  Kidneys/Ureters: Enhance symmetrically. No hydronephrosis. Lymph Nodes: No grossly enlarged retroperitoneal, mesenteric, or pelvic  adenopathy. Vasculature: Aorta is normal caliber. Bones: No gross bony lesions. Other: No ascites. Impression  Mural thickening of the rectum suspicious for neoplasm. Margins are  indistinct. Probable small mesorectal lymph nodes. There is one small cyst in  liver. No pulmonary nodules. [place]              PATH:  A:  \"RECTAL MASS BIOPSIES\":        FRAGMENTS OF AT LEAST IN SITU ADENOCARCINOMA. SEE COMMENT. COMMENT: BECAUSE BIOPSY FRAGMENTS ARE LIMITED PRIMARIALY TO MUCOSA,   SUBMUCOSAL INFILTRATION CANNOT BE RULED OUT          B:  \"RANDOM COLON BIOPSIES\":        FRAGMENTS OF HISTOLOGICALLY UNREMARKABLE LARGE INTESTINE          C:  \"ASCENDING COLON POLYP\":        FRAGMENT OF MIXED TUBULOVILLOUS ADENOMA         EUS 7/21/2022  Findings:      ENDOSCOPIC FINDINGS:  Limited views of the mucosa with the echoendoscope reveals a rectal mass. There are other no gross abnormalities. RECTUM:  The balloon was insufflated to improve acoustic coupling. At a distance 8 to 13 cm from the anal verge, there is up to 60% circumferential wall thickening. Maximal tumor thickness is 18.4 mm. There is a hypoechoic, heterogeneous mass that involves all wall layers including the mucosa, submucosa and muscularis propria. There is tumor penetration through the muscularis propria into the pericolorectal tissues by several small tumor pseudopodia. There are no pathologically enlarged rectal lymph nodes. The largest measures 6 mm in long-axis diameter. Views of the left iliac vessels, bladder, internal and external anal sphincters are unremarkable. Specimen:  No     Estimated Blood Loss:  None     Implant:  None           Impression:    1. Rectal cancer. This is staged T3N0Mx based on endoscopic ultrasound criteria.         MRI:  MRI Result (most recent):  MRI PELVIS W WO CONTRAST 08/03/2022     Narrative  MRI RECTUM 8/4/2022 8:54 AM     COMPARISON: PET/CT July 27, 2022     INDICATION: Rectal cancer     TECHNIQUE:  Multiplanar, multisequence MR imaging was performed of the rectum  prior to and following gadolinium contrast.  10 mL Prohance contrast material  was administrated intravenously for the examination. This study was acquired  following the IV administration of contrast material, given the patient's  indications for the examination. If IV contrast material had not been  administered, the likely of detecting abnormalities relevant to the patient's  condition would have been substantially decreased. FINDINGS:  FINDINGS:     PRIMARY TUMOR:  Distance from anorectal junction: 5.4 cm. Distance from anal verge: 6.8 cm. Relationship to anterior peritoneal reflection: Both above and below with bulk  of tumor above the peritoneal reflection. Craniocaudal length: 6.1 cm  Morphology: Circumferential tumor with disruption of the T2 hypointense bowel  wall posteriorly sagittal image number 15. Mucinous: No     T stage  - Extramural depth of invasion: 11 mm  - MR-T category:  T3c: Tumor penetrates 5-15 mm beyond muscularis propria        CIRCUMFERENTIAL RESECTION MARGIN: [For T3 Tumor Only]  - Shortest distance of tumor to MRF or anticipated CRM: Tethering of the  superior posterior mesorectal fascia with abnormal T2 signal extending into the  presacral space (T2 axial image 11-12)        MESORECTAL LYMPH NODES (superior rectal and mesorectal only) AND TUMOR DEPOSITS:  Scattered small perirectal lymph nodes ranging up to 5 mm short axis without  additional concerning features. OTHER: No suspicious osseous lesions. The uterus and adnexa are unremarkable.]  Trace physiologic free fluid within the dependent pelvis.      Impression  - T stage: High T3c rectal mass:  - N stage: N0  -MRF+      Assessment/Plan:  Rand Da Silva is a 49 y.o. female who has signs and symptoms consistent with a midrectal cancer 8-13cm from anal verge on EUS, s/p neoadjuvant treatment (JENNIFER followed by chemoradiation). It was discussed with the patient that it is recommended to have her operation in about 4 weeks as she finished her radiation 2 weeks ago. She will require a flexible sigmoidoscopy for evaluation and ink tattoo prior to her operation. She will then be scheduled to undergo a laparoscopic vs open low anterior resection with total mesorectal excision and diverting ileostomy. It was discussed that as she has had radiation to her rectal cancer, it is recommended to have a temporary diverting ileostomy to protect the anastomosis. The patient will follow the ERAS protocol. Discussed the patient's condition and treatment options with the patient. Discussed risks of surgery in language the patient could understand. The patient voiced understanding of all this and all questions were answered. Alternatives to surgery were discussed also and risks of the alternatives. The patient requested that we proceed with surgery.             Jose Guadalupe Lowe MD

## 2023-02-04 ENCOUNTER — ANESTHESIA EVENT (OUTPATIENT)
Dept: SURGERY | Age: 48
End: 2023-02-04
Payer: COMMERCIAL

## 2023-02-06 ENCOUNTER — HOSPITAL ENCOUNTER (INPATIENT)
Age: 48
LOS: 3 days | Discharge: HOME | End: 2023-02-09
Attending: SURGERY | Admitting: SURGERY
Payer: COMMERCIAL

## 2023-02-06 ENCOUNTER — ANESTHESIA (OUTPATIENT)
Dept: SURGERY | Age: 48
End: 2023-02-06
Payer: COMMERCIAL

## 2023-02-06 DIAGNOSIS — E61.1 IRON DEFICIENCY: Primary | ICD-10-CM

## 2023-02-06 DIAGNOSIS — C20 RECTAL CANCER (HCC): ICD-10-CM

## 2023-02-06 LAB
ABO + RH BLD: NORMAL
BLOOD GROUP ANTIBODIES SERPL: NORMAL
HCG UR QL: NEGATIVE
POTASSIUM BLD-SCNC: 3.3 MMOL/L (ref 3.5–5.1)
SPECIMEN EXP DATE BLD: NORMAL

## 2023-02-06 PROCEDURE — 6370000000 HC RX 637 (ALT 250 FOR IP): Performed by: ANESTHESIOLOGY

## 2023-02-06 PROCEDURE — 2720000010 HC SURG SUPPLY STERILE: Performed by: SURGERY

## 2023-02-06 PROCEDURE — 88305 TISSUE EXAM BY PATHOLOGIST: CPT

## 2023-02-06 PROCEDURE — 6360000002 HC RX W HCPCS: Performed by: SURGERY

## 2023-02-06 PROCEDURE — 2500000003 HC RX 250 WO HCPCS: Performed by: REGISTERED NURSE

## 2023-02-06 PROCEDURE — 2580000003 HC RX 258: Performed by: PHYSICIAN ASSISTANT

## 2023-02-06 PROCEDURE — 0DTN0ZZ RESECTION OF SIGMOID COLON, OPEN APPROACH: ICD-10-PCS | Performed by: SURGERY

## 2023-02-06 PROCEDURE — 6360000002 HC RX W HCPCS: Performed by: PHYSICIAN ASSISTANT

## 2023-02-06 PROCEDURE — 49905 OMENTAL FLAP INTRA-ABDOM: CPT | Performed by: SURGERY

## 2023-02-06 PROCEDURE — 7100000000 HC PACU RECOVERY - FIRST 15 MIN: Performed by: SURGERY

## 2023-02-06 PROCEDURE — 2580000003 HC RX 258: Performed by: ANESTHESIOLOGY

## 2023-02-06 PROCEDURE — 2500000003 HC RX 250 WO HCPCS: Performed by: SURGERY

## 2023-02-06 PROCEDURE — 2500000003 HC RX 250 WO HCPCS: Performed by: NURSE ANESTHETIST, CERTIFIED REGISTERED

## 2023-02-06 PROCEDURE — 3700000000 HC ANESTHESIA ATTENDED CARE: Performed by: SURGERY

## 2023-02-06 PROCEDURE — 2709999900 HC NON-CHARGEABLE SUPPLY: Performed by: SURGERY

## 2023-02-06 PROCEDURE — 81025 URINE PREGNANCY TEST: CPT

## 2023-02-06 PROCEDURE — 86850 RBC ANTIBODY SCREEN: CPT

## 2023-02-06 PROCEDURE — 6370000000 HC RX 637 (ALT 250 FOR IP): Performed by: SURGERY

## 2023-02-06 PROCEDURE — 44213 LAP MOBIL SPLENIC FL ADD-ON: CPT | Performed by: SURGERY

## 2023-02-06 PROCEDURE — 84132 ASSAY OF SERUM POTASSIUM: CPT

## 2023-02-06 PROCEDURE — 7100000001 HC PACU RECOVERY - ADDTL 15 MIN: Performed by: SURGERY

## 2023-02-06 PROCEDURE — 3600000014 HC SURGERY LEVEL 4 ADDTL 15MIN: Performed by: SURGERY

## 2023-02-06 PROCEDURE — 6360000002 HC RX W HCPCS: Performed by: ANESTHESIOLOGY

## 2023-02-06 PROCEDURE — 6370000000 HC RX 637 (ALT 250 FOR IP): Performed by: PHYSICIAN ASSISTANT

## 2023-02-06 PROCEDURE — 1100000000 HC RM PRIVATE

## 2023-02-06 PROCEDURE — 6360000002 HC RX W HCPCS: Performed by: REGISTERED NURSE

## 2023-02-06 PROCEDURE — 3700000001 HC ADD 15 MINUTES (ANESTHESIA): Performed by: SURGERY

## 2023-02-06 PROCEDURE — 2500000003 HC RX 250 WO HCPCS: Performed by: PHYSICIAN ASSISTANT

## 2023-02-06 PROCEDURE — 45111 PARTIAL REMOVAL OF RECTUM: CPT | Performed by: SURGERY

## 2023-02-06 PROCEDURE — 3600000004 HC SURGERY LEVEL 4 BASE: Performed by: SURGERY

## 2023-02-06 PROCEDURE — 0WUF07Z SUPPLEMENT ABDOMINAL WALL WITH AUTOLOGOUS TISSUE SUBSTITUTE, OPEN APPROACH: ICD-10-PCS | Performed by: SURGERY

## 2023-02-06 PROCEDURE — 44207 L COLECTOMY/COLOPROCTOSTOMY: CPT | Performed by: SURGERY

## 2023-02-06 PROCEDURE — 6360000002 HC RX W HCPCS: Performed by: NURSE ANESTHETIST, CERTIFIED REGISTERED

## 2023-02-06 PROCEDURE — 88309 TISSUE EXAM BY PATHOLOGIST: CPT

## 2023-02-06 RX ORDER — ONDANSETRON 4 MG/1
4 TABLET, ORALLY DISINTEGRATING ORAL EVERY 4 HOURS PRN
Status: DISCONTINUED | OUTPATIENT
Start: 2023-02-06 | End: 2023-02-09 | Stop reason: HOSPADM

## 2023-02-06 RX ORDER — ONDANSETRON 2 MG/ML
INJECTION INTRAMUSCULAR; INTRAVENOUS PRN
Status: DISCONTINUED | OUTPATIENT
Start: 2023-02-06 | End: 2023-02-06 | Stop reason: SDUPTHER

## 2023-02-06 RX ORDER — KETOROLAC TROMETHAMINE 30 MG/ML
30 INJECTION, SOLUTION INTRAMUSCULAR; INTRAVENOUS EVERY 6 HOURS PRN
Status: DISCONTINUED | OUTPATIENT
Start: 2023-02-06 | End: 2023-02-09 | Stop reason: HOSPADM

## 2023-02-06 RX ORDER — SODIUM CHLORIDE 0.9 % (FLUSH) 0.9 %
5-40 SYRINGE (ML) INJECTION EVERY 12 HOURS SCHEDULED
Status: DISCONTINUED | OUTPATIENT
Start: 2023-02-06 | End: 2023-02-06 | Stop reason: HOSPADM

## 2023-02-06 RX ORDER — ONDANSETRON 2 MG/ML
4 INJECTION INTRAMUSCULAR; INTRAVENOUS
Status: COMPLETED | OUTPATIENT
Start: 2023-02-06 | End: 2023-02-06

## 2023-02-06 RX ORDER — SODIUM CHLORIDE, SODIUM LACTATE, POTASSIUM CHLORIDE, CALCIUM CHLORIDE 600; 310; 30; 20 MG/100ML; MG/100ML; MG/100ML; MG/100ML
INJECTION, SOLUTION INTRAVENOUS CONTINUOUS
Status: DISCONTINUED | OUTPATIENT
Start: 2023-02-06 | End: 2023-02-07 | Stop reason: SDUPTHER

## 2023-02-06 RX ORDER — LIDOCAINE HYDROCHLORIDE ANHYDROUS AND DEXTROSE MONOHYDRATE 5; 400 G/100ML; MG/100ML
INJECTION, SOLUTION INTRAVENOUS CONTINUOUS PRN
Status: DISCONTINUED | OUTPATIENT
Start: 2023-02-06 | End: 2023-02-06 | Stop reason: SDUPTHER

## 2023-02-06 RX ORDER — HALOPERIDOL 5 MG/ML
1 INJECTION INTRAMUSCULAR ONCE AS NEEDED
Status: DISCONTINUED | OUTPATIENT
Start: 2023-02-06 | End: 2023-02-09 | Stop reason: HOSPADM

## 2023-02-06 RX ORDER — ENOXAPARIN SODIUM 100 MG/ML
40 INJECTION SUBCUTANEOUS DAILY
Status: DISCONTINUED | OUTPATIENT
Start: 2023-02-06 | End: 2023-02-09 | Stop reason: HOSPADM

## 2023-02-06 RX ORDER — SODIUM CHLORIDE 0.9 % (FLUSH) 0.9 %
5-40 SYRINGE (ML) INJECTION PRN
Status: DISCONTINUED | OUTPATIENT
Start: 2023-02-06 | End: 2023-02-06 | Stop reason: HOSPADM

## 2023-02-06 RX ORDER — OXYCODONE HYDROCHLORIDE 5 MG/1
5 TABLET ORAL EVERY 4 HOURS PRN
Status: DISCONTINUED | OUTPATIENT
Start: 2023-02-06 | End: 2023-02-09 | Stop reason: HOSPADM

## 2023-02-06 RX ORDER — OXYCODONE HYDROCHLORIDE 5 MG/1
5 TABLET ORAL
Status: COMPLETED | OUTPATIENT
Start: 2023-02-06 | End: 2023-02-06

## 2023-02-06 RX ORDER — SODIUM CHLORIDE, SODIUM LACTATE, POTASSIUM CHLORIDE, CALCIUM CHLORIDE 600; 310; 30; 20 MG/100ML; MG/100ML; MG/100ML; MG/100ML
INJECTION, SOLUTION INTRAVENOUS CONTINUOUS
Status: ACTIVE | OUTPATIENT
Start: 2023-02-06 | End: 2023-02-07

## 2023-02-06 RX ORDER — FENTANYL CITRATE 50 UG/ML
INJECTION, SOLUTION INTRAMUSCULAR; INTRAVENOUS PRN
Status: DISCONTINUED | OUTPATIENT
Start: 2023-02-06 | End: 2023-02-06 | Stop reason: SDUPTHER

## 2023-02-06 RX ORDER — LIDOCAINE HYDROCHLORIDE 20 MG/ML
INJECTION, SOLUTION EPIDURAL; INFILTRATION; INTRACAUDAL; PERINEURAL PRN
Status: DISCONTINUED | OUTPATIENT
Start: 2023-02-06 | End: 2023-02-06 | Stop reason: SDUPTHER

## 2023-02-06 RX ORDER — FENTANYL CITRATE 50 UG/ML
50 INJECTION, SOLUTION INTRAMUSCULAR; INTRAVENOUS PRN
Status: DISCONTINUED | OUTPATIENT
Start: 2023-02-06 | End: 2023-02-06 | Stop reason: HOSPADM

## 2023-02-06 RX ORDER — EPHEDRINE SULFATE/0.9% NACL/PF 50 MG/5 ML
SYRINGE (ML) INTRAVENOUS PRN
Status: DISCONTINUED | OUTPATIENT
Start: 2023-02-06 | End: 2023-02-06 | Stop reason: SDUPTHER

## 2023-02-06 RX ORDER — GLYCOPYRROLATE 0.2 MG/ML
INJECTION INTRAMUSCULAR; INTRAVENOUS PRN
Status: DISCONTINUED | OUTPATIENT
Start: 2023-02-06 | End: 2023-02-06 | Stop reason: SDUPTHER

## 2023-02-06 RX ORDER — LIDOCAINE HYDROCHLORIDE 10 MG/ML
1 INJECTION, SOLUTION INFILTRATION; PERINEURAL
Status: DISCONTINUED | OUTPATIENT
Start: 2023-02-06 | End: 2023-02-06 | Stop reason: HOSPADM

## 2023-02-06 RX ORDER — SODIUM CHLORIDE 9 MG/ML
INJECTION, SOLUTION INTRAVENOUS PRN
Status: DISCONTINUED | OUTPATIENT
Start: 2023-02-06 | End: 2023-02-06 | Stop reason: HOSPADM

## 2023-02-06 RX ORDER — PROPOFOL 10 MG/ML
INJECTION, EMULSION INTRAVENOUS PRN
Status: DISCONTINUED | OUTPATIENT
Start: 2023-02-06 | End: 2023-02-06 | Stop reason: SDUPTHER

## 2023-02-06 RX ORDER — GABAPENTIN 300 MG/1
300 CAPSULE ORAL 3 TIMES DAILY
Status: DISCONTINUED | OUTPATIENT
Start: 2023-02-06 | End: 2023-02-09 | Stop reason: HOSPADM

## 2023-02-06 RX ORDER — METRONIDAZOLE 500 MG/100ML
500 INJECTION, SOLUTION INTRAVENOUS EVERY 8 HOURS
Status: COMPLETED | OUTPATIENT
Start: 2023-02-06 | End: 2023-02-07

## 2023-02-06 RX ORDER — HYDROMORPHONE HYDROCHLORIDE 1 MG/ML
0.5 INJECTION, SOLUTION INTRAMUSCULAR; INTRAVENOUS; SUBCUTANEOUS EVERY 5 MIN PRN
Status: COMPLETED | OUTPATIENT
Start: 2023-02-06 | End: 2023-02-06

## 2023-02-06 RX ORDER — DOCUSATE SODIUM 100 MG/1
100 CAPSULE, LIQUID FILLED ORAL 2 TIMES DAILY
Status: DISCONTINUED | OUTPATIENT
Start: 2023-02-06 | End: 2023-02-09 | Stop reason: HOSPADM

## 2023-02-06 RX ORDER — NEOSTIGMINE METHYLSULFATE 1 MG/ML
INJECTION, SOLUTION INTRAVENOUS PRN
Status: DISCONTINUED | OUTPATIENT
Start: 2023-02-06 | End: 2023-02-06 | Stop reason: SDUPTHER

## 2023-02-06 RX ORDER — PROCHLORPERAZINE EDISYLATE 5 MG/ML
10 INJECTION INTRAMUSCULAR; INTRAVENOUS EVERY 6 HOURS PRN
Status: DISCONTINUED | OUTPATIENT
Start: 2023-02-06 | End: 2023-02-09 | Stop reason: HOSPADM

## 2023-02-06 RX ORDER — MIDAZOLAM HYDROCHLORIDE 2 MG/2ML
2 INJECTION, SOLUTION INTRAMUSCULAR; INTRAVENOUS
Status: DISCONTINUED | OUTPATIENT
Start: 2023-02-06 | End: 2023-02-06 | Stop reason: HOSPADM

## 2023-02-06 RX ORDER — DEXAMETHASONE SODIUM PHOSPHATE 10 MG/ML
INJECTION INTRAMUSCULAR; INTRAVENOUS PRN
Status: DISCONTINUED | OUTPATIENT
Start: 2023-02-06 | End: 2023-02-06 | Stop reason: SDUPTHER

## 2023-02-06 RX ORDER — METRONIDAZOLE 500 MG/100ML
500 INJECTION, SOLUTION INTRAVENOUS ONCE
Status: COMPLETED | OUTPATIENT
Start: 2023-02-06 | End: 2023-02-06

## 2023-02-06 RX ORDER — HYDROMORPHONE HYDROCHLORIDE 1 MG/ML
1 INJECTION, SOLUTION INTRAMUSCULAR; INTRAVENOUS; SUBCUTANEOUS EVERY 4 HOURS PRN
Status: DISCONTINUED | OUTPATIENT
Start: 2023-02-06 | End: 2023-02-09 | Stop reason: HOSPADM

## 2023-02-06 RX ORDER — MAGNESIUM SULFATE IN WATER 40 MG/ML
2000 INJECTION, SOLUTION INTRAVENOUS PRN
Status: DISCONTINUED | OUTPATIENT
Start: 2023-02-06 | End: 2023-02-09 | Stop reason: HOSPADM

## 2023-02-06 RX ORDER — ACETAMINOPHEN 500 MG
1000 TABLET ORAL EVERY 8 HOURS
Status: DISCONTINUED | OUTPATIENT
Start: 2023-02-06 | End: 2023-02-09 | Stop reason: HOSPADM

## 2023-02-06 RX ORDER — LIDOCAINE HYDROCHLORIDE ANHYDROUS AND DEXTROSE MONOHYDRATE 5; 400 G/100ML; MG/100ML
1 INJECTION, SOLUTION INTRAVENOUS CONTINUOUS
Status: ACTIVE | OUTPATIENT
Start: 2023-02-06 | End: 2023-02-07

## 2023-02-06 RX ORDER — ONDANSETRON 2 MG/ML
4 INJECTION INTRAMUSCULAR; INTRAVENOUS EVERY 6 HOURS PRN
Status: DISCONTINUED | OUTPATIENT
Start: 2023-02-06 | End: 2023-02-09 | Stop reason: HOSPADM

## 2023-02-06 RX ORDER — SODIUM CHLORIDE 0.9 % (FLUSH) 0.9 %
5-40 SYRINGE (ML) INJECTION EVERY 12 HOURS SCHEDULED
Status: DISCONTINUED | OUTPATIENT
Start: 2023-02-06 | End: 2023-02-09 | Stop reason: HOSPADM

## 2023-02-06 RX ORDER — KETAMINE HYDROCHLORIDE 50 MG/ML
INJECTION, SOLUTION, CONCENTRATE INTRAMUSCULAR; INTRAVENOUS PRN
Status: DISCONTINUED | OUTPATIENT
Start: 2023-02-06 | End: 2023-02-06 | Stop reason: SDUPTHER

## 2023-02-06 RX ORDER — SODIUM CHLORIDE, SODIUM LACTATE, POTASSIUM CHLORIDE, CALCIUM CHLORIDE 600; 310; 30; 20 MG/100ML; MG/100ML; MG/100ML; MG/100ML
INJECTION, SOLUTION INTRAVENOUS CONTINUOUS
Status: DISCONTINUED | OUTPATIENT
Start: 2023-02-06 | End: 2023-02-06 | Stop reason: HOSPADM

## 2023-02-06 RX ORDER — ROCURONIUM BROMIDE 10 MG/ML
INJECTION, SOLUTION INTRAVENOUS PRN
Status: DISCONTINUED | OUTPATIENT
Start: 2023-02-06 | End: 2023-02-06 | Stop reason: SDUPTHER

## 2023-02-06 RX ORDER — HYDROMORPHONE HCL 110MG/55ML
PATIENT CONTROLLED ANALGESIA SYRINGE INTRAVENOUS PRN
Status: DISCONTINUED | OUTPATIENT
Start: 2023-02-06 | End: 2023-02-06 | Stop reason: SDUPTHER

## 2023-02-06 RX ORDER — FENTANYL CITRATE 50 UG/ML
100 INJECTION, SOLUTION INTRAMUSCULAR; INTRAVENOUS PRN
Status: DISCONTINUED | OUTPATIENT
Start: 2023-02-06 | End: 2023-02-06 | Stop reason: HOSPADM

## 2023-02-06 RX ORDER — PANTOPRAZOLE SODIUM 40 MG/1
40 TABLET, DELAYED RELEASE ORAL DAILY
Status: DISCONTINUED | OUTPATIENT
Start: 2023-02-06 | End: 2023-02-09 | Stop reason: HOSPADM

## 2023-02-06 RX ORDER — SUCCINYLCHOLINE CHLORIDE 20 MG/ML
INJECTION INTRAMUSCULAR; INTRAVENOUS PRN
Status: DISCONTINUED | OUTPATIENT
Start: 2023-02-06 | End: 2023-02-06 | Stop reason: SDUPTHER

## 2023-02-06 RX ADMIN — SODIUM CHLORIDE, SODIUM LACTATE, POTASSIUM CHLORIDE, AND CALCIUM CHLORIDE: 600; 310; 30; 20 INJECTION, SOLUTION INTRAVENOUS at 11:57

## 2023-02-06 RX ADMIN — KETAMINE HYDROCHLORIDE 23 MG: 50 INJECTION, SOLUTION INTRAMUSCULAR; INTRAVENOUS at 11:24

## 2023-02-06 RX ADMIN — FENTANYL CITRATE 100 MCG: 50 INJECTION, SOLUTION INTRAMUSCULAR; INTRAVENOUS at 09:20

## 2023-02-06 RX ADMIN — LIDOCAINE HYDROCHLORIDE 100 MG: 20 INJECTION, SOLUTION EPIDURAL; INFILTRATION; INTRACAUDAL; PERINEURAL at 09:20

## 2023-02-06 RX ADMIN — HYDROMORPHONE HYDROCHLORIDE 0.5 MG: 1 INJECTION, SOLUTION INTRAMUSCULAR; INTRAVENOUS; SUBCUTANEOUS at 13:11

## 2023-02-06 RX ADMIN — ROCURONIUM BROMIDE 5 MG: 50 INJECTION INTRAVENOUS at 09:20

## 2023-02-06 RX ADMIN — ACETAMINOPHEN 1000 MG: 500 TABLET, FILM COATED ORAL at 17:49

## 2023-02-06 RX ADMIN — ROCURONIUM BROMIDE 10 MG: 50 INJECTION INTRAVENOUS at 10:37

## 2023-02-06 RX ADMIN — GABAPENTIN 300 MG: 300 CAPSULE ORAL at 17:49

## 2023-02-06 RX ADMIN — LIDOCAINE HYDROCHLORIDE 1 MG/KG/HR: 4 INJECTION, SOLUTION INTRAVENOUS at 09:43

## 2023-02-06 RX ADMIN — HYDROMORPHONE HYDROCHLORIDE 0.5 MG: 1 INJECTION, SOLUTION INTRAMUSCULAR; INTRAVENOUS; SUBCUTANEOUS at 13:41

## 2023-02-06 RX ADMIN — NALOXEGOL OXALATE 25 MG: 25 TABLET, FILM COATED ORAL at 08:47

## 2023-02-06 RX ADMIN — Medication 2 MG: at 12:39

## 2023-02-06 RX ADMIN — HYDROMORPHONE HYDROCHLORIDE 0.2 MG: 2 INJECTION INTRAMUSCULAR; INTRAVENOUS; SUBCUTANEOUS at 10:59

## 2023-02-06 RX ADMIN — HYDROMORPHONE HYDROCHLORIDE 0.2 MG: 2 INJECTION INTRAMUSCULAR; INTRAVENOUS; SUBCUTANEOUS at 12:17

## 2023-02-06 RX ADMIN — HYDROMORPHONE HYDROCHLORIDE 0.5 MG: 1 INJECTION, SOLUTION INTRAMUSCULAR; INTRAVENOUS; SUBCUTANEOUS at 14:10

## 2023-02-06 RX ADMIN — ROCURONIUM BROMIDE 5 MG: 50 INJECTION INTRAVENOUS at 11:51

## 2023-02-06 RX ADMIN — METRONIDAZOLE 500 MG: 500 INJECTION, SOLUTION INTRAVENOUS at 08:47

## 2023-02-06 RX ADMIN — OXYCODONE HYDROCHLORIDE 5 MG: 5 TABLET ORAL at 14:26

## 2023-02-06 RX ADMIN — DEXAMETHASONE SODIUM PHOSPHATE 10 MG: 10 INJECTION INTRAMUSCULAR; INTRAVENOUS at 09:47

## 2023-02-06 RX ADMIN — HYDROMORPHONE HYDROCHLORIDE 1 MG: 1 INJECTION, SOLUTION INTRAMUSCULAR; INTRAVENOUS; SUBCUTANEOUS at 22:04

## 2023-02-06 RX ADMIN — HYDROMORPHONE HYDROCHLORIDE 0.5 MG: 1 INJECTION, SOLUTION INTRAMUSCULAR; INTRAVENOUS; SUBCUTANEOUS at 13:25

## 2023-02-06 RX ADMIN — NALOXEGOL OXALATE 25 MG: 25 TABLET, FILM COATED ORAL at 17:49

## 2023-02-06 RX ADMIN — Medication 5 MG: at 10:08

## 2023-02-06 RX ADMIN — SODIUM CHLORIDE, SODIUM LACTATE, POTASSIUM CHLORIDE, AND CALCIUM CHLORIDE: 600; 310; 30; 20 INJECTION, SOLUTION INTRAVENOUS at 08:47

## 2023-02-06 RX ADMIN — GLYCOPYRROLATE 0.2 MG: 0.2 INJECTION INTRAMUSCULAR; INTRAVENOUS at 12:39

## 2023-02-06 RX ADMIN — HYDROMORPHONE HYDROCHLORIDE 0.2 MG: 2 INJECTION INTRAMUSCULAR; INTRAVENOUS; SUBCUTANEOUS at 12:00

## 2023-02-06 RX ADMIN — SODIUM CHLORIDE, POTASSIUM CHLORIDE, SODIUM LACTATE AND CALCIUM CHLORIDE: 600; 310; 30; 20 INJECTION, SOLUTION INTRAVENOUS at 13:28

## 2023-02-06 RX ADMIN — Medication 120 MG: at 09:20

## 2023-02-06 RX ADMIN — SODIUM CHLORIDE, PRESERVATIVE FREE 10 ML: 5 INJECTION INTRAVENOUS at 22:40

## 2023-02-06 RX ADMIN — ONDANSETRON 4 MG: 2 INJECTION INTRAMUSCULAR; INTRAVENOUS at 13:06

## 2023-02-06 RX ADMIN — CEFAZOLIN SODIUM 2000 MG: 100 INJECTION, POWDER, LYOPHILIZED, FOR SOLUTION INTRAVENOUS at 17:50

## 2023-02-06 RX ADMIN — ENOXAPARIN SODIUM 40 MG: 100 INJECTION SUBCUTANEOUS at 17:48

## 2023-02-06 RX ADMIN — METRONIDAZOLE 500 MG: 500 INJECTION, SOLUTION INTRAVENOUS at 17:50

## 2023-02-06 RX ADMIN — ONDANSETRON 4 MG: 2 INJECTION INTRAMUSCULAR; INTRAVENOUS at 22:08

## 2023-02-06 RX ADMIN — GLYCOPYRROLATE 0.2 MG: 0.2 INJECTION INTRAMUSCULAR; INTRAVENOUS at 12:37

## 2023-02-06 RX ADMIN — PANTOPRAZOLE SODIUM 40 MG: 40 TABLET, DELAYED RELEASE ORAL at 17:49

## 2023-02-06 RX ADMIN — ROCURONIUM BROMIDE 45 MG: 50 INJECTION INTRAVENOUS at 09:35

## 2023-02-06 RX ADMIN — PROPOFOL 200 MG: 10 INJECTION, EMULSION INTRAVENOUS at 09:20

## 2023-02-06 RX ADMIN — ONDANSETRON 4 MG: 2 INJECTION INTRAMUSCULAR; INTRAVENOUS at 12:15

## 2023-02-06 RX ADMIN — Medication 2 G: at 09:30

## 2023-02-06 RX ADMIN — Medication 1 MG: at 12:37

## 2023-02-06 RX ADMIN — KETOROLAC TROMETHAMINE 30 MG: 30 INJECTION, SOLUTION INTRAMUSCULAR at 19:30

## 2023-02-06 RX ADMIN — LIDOCAINE HYDROCHLORIDE 1 MG/KG/HR: 4 INJECTION, SOLUTION INTRAVENOUS at 13:04

## 2023-02-06 RX ADMIN — KETAMINE HYDROCHLORIDE 25 MG: 50 INJECTION, SOLUTION INTRAMUSCULAR; INTRAVENOUS at 09:43

## 2023-02-06 ASSESSMENT — PAIN DESCRIPTION - FREQUENCY: FREQUENCY: CONTINUOUS

## 2023-02-06 ASSESSMENT — PAIN DESCRIPTION - LOCATION
LOCATION: ABDOMEN

## 2023-02-06 ASSESSMENT — PAIN DESCRIPTION - ONSET: ONSET: ON-GOING

## 2023-02-06 ASSESSMENT — PAIN DESCRIPTION - DESCRIPTORS
DESCRIPTORS: SORE
DESCRIPTORS: OTHER (COMMENT)

## 2023-02-06 ASSESSMENT — PAIN SCALES - GENERAL
PAINLEVEL_OUTOF10: 6
PAINLEVEL_OUTOF10: 5
PAINLEVEL_OUTOF10: 4
PAINLEVEL_OUTOF10: 7
PAINLEVEL_OUTOF10: 7

## 2023-02-06 ASSESSMENT — PAIN DESCRIPTION - ORIENTATION
ORIENTATION: ANTERIOR;LOWER
ORIENTATION: LOWER

## 2023-02-06 ASSESSMENT — PAIN - FUNCTIONAL ASSESSMENT: PAIN_FUNCTIONAL_ASSESSMENT: 0-10

## 2023-02-06 ASSESSMENT — PAIN DESCRIPTION - PAIN TYPE: TYPE: SURGICAL PAIN

## 2023-02-06 NOTE — BRIEF OP NOTE
Brief Postoperative Note      Patient: Army Frances  YOB: 1975  MRN: [id number]    Date of Procedure: 2/6/2023    Pre-Op Diagnosis: Rectal cancer ([address] [C20], s/p chemoradiation    Post-Op Diagnosis: Same       Procedures:  1 laparoscopic LAR with primary low pelvic anastomosis  2 Total mesorectal excision  3. Lap mobilization of splenic flexure  4. Omental flap    Surgeon(s):  López Field MD    Assistant:  Surgical Assistant: Cory Burnette    Anesthesia: General    Estimated Blood Loss (mL): less than 50     Complications: None    Specimens:   ID Type Source Tests Collected by Time Destination   A : Rectosigmoid Tissue Tissue SURGICAL PATHOLOGY López Field MD 2/6/2023 1218    B : Distal rectal margin Tissue Tissue SURGICAL PATHOLOGY López Field MD 2/6/2023 1222        Implants:  * No implants in log *      Drains:   Closed/Suction Drain Left;Superior LLQ (Active)   Site Description Clean, dry & intact 02/06/23 1500   Dressing Status Clean, dry & intact 02/06/23 1500   Drainage Appearance Bright red 02/06/23 1500   Drain Status To bulb suction; Compressed 02/06/23 1500   Output (ml) 45 ml 02/06/23 1508       Urinary Catheter 02/06/23 2 Way (Active)   Catheter Indications Perioperative use for selected surgical procedures 02/06/23 1255   Site Assessment No urethral drainage 02/06/23 1255   Urine Color Yellow 02/06/23 1500   Urine Appearance Clear 02/06/23 1500   Collection Container Standard 02/06/23 1500   Securement Method Securing device (Describe) 02/06/23 1500   Catheter Best Practices  Drainage tube clipped to bed;Catheter secured to thigh; Tamper seal intact; Bag below bladder;Bag not on floor; Lack of dependent loop in tubing;Drainage bag less than half full 02/06/23 1500   Status Draining;Patent 02/06/23 1500   Output (mL) 275 mL 02/06/23 1500       Findings: 1 mid rectal cancer just above peritoneal reflection, no evidence of peritoneal disease    Electronically signed by López Field MD on 2/6/2023 at 4:43 PM

## 2023-02-06 NOTE — ANESTHESIA PROCEDURE NOTES
Airway  Date/Time: 2/6/2023 9:24 AM  Urgency: elective    Airway not difficult    General Information and Staff    Patient location during procedure: OR  Resident/CRNA: OLAYINKA Carranza CRNA  Performed: resident/CRNA     Indications and Patient Condition  Indications for airway management: anesthesia  Spontaneous Ventilation: absent  Sedation level: deep  Preoxygenated: yes  Patient position: sniffing  MILS not maintained throughout  Mask difficulty assessment: not attempted    Final Airway Details  Final airway type: endotracheal airway      Successful airway: ETT  Cuffed: yes   Successful intubation technique: direct laryngoscopy  Facilitating devices/methods: intubating stylet, cricoid pressure and anterior pressure/BURP  Endotracheal tube insertion site: oral  Blade: Omero  Blade size: #3  Cormack-Lehane Classification: grade I - full view of glottis  Placement verified by: chest auscultation and capnometry   Measured from: lips  ETT to lips (cm): 20  Number of attempts at approach: 1  Ventilation between attempts: none    Additional Comments  Dentition and lips unchanged from preop

## 2023-02-06 NOTE — ANESTHESIA POSTPROCEDURE EVALUATION
Department of Anesthesiology  Postprocedure Note    Patient: Enedelia Reeves  MRN: [id number]  YOB: 1975  Date of evaluation: 2/6/2023      Procedure Summary     Date: 02/06/23 Room / Location: [facility] MAIN OR 02 / [facility] MAIN OR    Anesthesia Start: 3566 Anesthesia Stop: 5590    Procedure: ERA'S LAPAROSCOPIC LOW ANTERIOR COLON RESECTION WITH TOTAL MESORECTAL EXCISION AND DIVERTING ILEOSTOMY (Abdomen) Diagnosis:       Rectal cancer ([facility]      (Rectal cancer ([facility] [C20])    Providers: Jeana Vizcaino MD Responsible Provider: Nic Talavera MD    Anesthesia Type: general ASA Status: 1          Anesthesia Type: No value filed.     Víctor Phase I: Víctor Score: 8    Víctor Phase II:        Anesthesia Post Evaluation    Patient location during evaluation: PACU  Patient participation: complete - patient participated  Level of consciousness: awake and alert  Pain score: 2  Airway patency: patent  Nausea & Vomiting: no nausea and no vomiting  Complications: no  Cardiovascular status: blood pressure returned to baseline  Respiratory status: acceptable  Hydration status: euvolemic  Comments: /74   Pulse 85   Temp 98.1 °F (36.7 °C) (Temporal)   Resp 16   Wt 119 lb (54 kg)   SpO2 97%   BMI 21.77 kg/m²   Multimodal analgesia pain management approach

## 2023-02-06 NOTE — PROGRESS NOTES
Patient speaks [place] as their preferred language for their healthcare communication. If there are technical problems using the AMN mobil unit, please contact Language Services for interpretation at:    [facility] via phone # [phone]  General phone: [phone] ( [phone])  Email: [email or web address]    Please always document the use of interpreting services (name and/or 's ID number) in your clinical notes. Our interpreters are available for team members working with limited  English proficient (LEP) patients remotely, in person (if needed for special cases), as phone or video interpreters on the AM[employer] Mobil units.       Kirk Saeed [address] [facility] [employer]  [phone] (phone)

## 2023-02-06 NOTE — PERIOP NOTE
TRANSFER - OUT REPORT:    Verbal report given to Marybeth Lu RN on Pati Rom  being transferred to [facility] for routine progression of patient care       Report consisted of patient's Situation, Background, Assessment and   Recommendations(SBAR). Information from the following report(s) Nurse Handoff Report, Surgery Report, Intake/Output, MAR, and Cardiac Rhythm NSR  was reviewed with the receiving nurse. Lincoln Assessment: No data recorded  Lines:   Single Lumen Implantable Port 08/02/22 Right Chest (Active)       Peripheral IV 02/06/23 Left;Posterior Wrist (Active)   Site Assessment Clean, dry & intact 02/06/23 1500   Line Status Blood return noted; Infusing 02/06/23 0854   Phlebitis Assessment No symptoms 02/06/23 1500   Infiltration Assessment 0 02/06/23 1500   Dressing Status Clean, dry & intact 02/06/23 1500   Dressing Type Transparent 02/06/23 0847       Peripheral IV 02/06/23 Right Hand (Active)   Site Assessment Clean, dry & intact 02/06/23 1500   Phlebitis Assessment No symptoms 02/06/23 1500   Infiltration Assessment 0 02/06/23 1500   Dressing Status Clean, dry & intact 02/06/23 1500   Dressing Type Transparent 02/06/23 1500        Opportunity for questions and clarification was provided.

## 2023-02-06 NOTE — ANESTHESIA PRE PROCEDURE
Department of Anesthesiology  Preprocedure Note       Name:  Ivonne Valadez   Age:  49 y.o.  :  1975                                          MRN:  [id number]         Date:  2023      Surgeon: Jerome May):  Lane Ibarra MD    Procedure: Procedure(s):  ERA'S LAPAROSCOPIC LOW ANTERIOR COLON RESECTION WITH TOTAL MESORECTAL EXCISION AND DIVERTING ILEOSTOMY    Medications prior to admission:   Prior to Admission medications    Medication Sig Start Date End Date Taking? Authorizing Provider   Hydrocort-Pramoxine, Perianal, (ANALPRAM-HC) 2.5-1 % rectal cream Place rectally in the morning and at bedtime  Patient not taking: No sig reported 22   Historical Provider, MD   lidocaine-prilocaine (EMLA) 2.5-2.5 % cream Apply topically as needed. Place small amount to port site 45 minutes prior to blood draws and/or infusion. Cover with plastic wrap  Patient not taking: No sig reported 22   Ameena Fischer MD       Current medications:    No current facility-administered medications for this visit. No current outpatient medications on file.      Facility-Administered Medications Ordered in Other Visits   Medication Dose Route Frequency Provider Last Rate Last Admin    lidocaine 1 % injection 1 mL  1 mL IntraDERmal Once PRN Nandini Mustafa MD        fentaNYL (SUBLIMAZE) injection 100 mcg  100 mcg IntraVENous PRN Nandini Mustafa MD        Or    fentaNYL (SUBLIMAZE) injection 50 mcg  50 mcg IntraVENous PRN Nandini Mustafa MD        lactated ringers IV soln infusion   IntraVENous Continuous Nandini Mustafa  mL/hr at 23 0847 New Bag at 23 0847    midazolam PF (VERSED) injection 2 mg  2 mg IntraVENous Once PRN Nandini Mustafa MD        sodium chloride flush 0.9 % injection 5-40 mL  5-40 mL IntraVENous 2 times per day Lane Ibarra MD        sodium chloride flush 0.9 % injection 5-40 mL  5-40 mL IntraVENous PRN Lane Ibarra MD        0.9 % sodium chloride infusion IntraVENous PRN Saúl Rubio MD        ceFAZolin (ANCEF) 2000 mg in sterile water 20 mL IV syringe  2,000 mg IntraVENous On Call to [address]  MD Misty        metronidazole (FLAGYL) 500 mg in 0.9% NaCl 100 mL IVPB premix  500 mg IntraVENous Once Saúl Rubio  mL/hr at 23 0847 500 mg at 23 0847    sodium chloride flush 0.9 % injection 5-40 mL  5-40 mL IntraVENous PRN Inder Jara MD   10 mL at 22 1644       Allergies: Allergies   Allergen Reactions    Amoxicillin Rash       Problem List:    Patient Active Problem List   Diagnosis Code    Rectal cancer ([facility] C20    Iron deficiency E61.1       Past Medical History:        Diagnosis Date    Cancer [facility])     rectal cancer-- dx 2022---- followed by dr Mi Olsen-- per spouse after scans/test  then will determine what the plan is    Rectal bleeding     onset 2022       Past Surgical History:        Procedure Laterality Date     SECTION      COLONOSCOPY      COLONOSCOPY N/A 2022    COLONOSCOPY ULTRASOUND/ BMI 19 performed by Lorenzo Wilkinson MD at [place] [address] IR PORT PLACEMENT EQUAL OR GREATER THAN 5 YEARS  2022    IR PORT PLACEMENT EQUAL OR GREATER THAN 5 YEARS 2022 [facility]    SIGMOIDOSCOPY N/A 2023    SIGMOIDOSCOPY DIAGNOSTIC FLEXIBLE performed by Saúl Rubio MD at [facility] ENDOSCOPY       Social History:    Social History     Tobacco Use    Smoking status: Never    Smokeless tobacco: Never   Substance Use Topics    Alcohol use: Yes     Comment: rare                                Counseling given: Not Answered      Vital Signs (Current): There were no vitals filed for this visit.                                            BP Readings from Last 3 Encounters:   23 124/73   23 (!) 154/76   23 115/81       NPO Status:                                                                                 BMI:   Wt Readings from Last 3 Encounters:   23 119 lb (54 kg)   23 120 lb (54.4 kg)   01/23/23 120 lb 12.8 oz (54.8 kg)     There is no height or weight on file to calculate BMI.    CBC:   Lab Results   Component Value Date/Time    WBC 3.9 01/23/2023 03:23 PM    RBC 3.91 01/23/2023 03:23 PM    HGB 12.6 01/23/2023 03:23 PM    HCT 37.4 01/23/2023 03:23 PM    MCV 95.7 01/23/2023 03:23 PM    RDW 12.2 01/23/2023 03:23 PM     01/23/2023 03:23 PM       CMP:   Lab Results   Component Value Date/Time     01/23/2023 03:23 PM    K 3.6 01/23/2023 03:23 PM     01/23/2023 03:23 PM    CO2 29 01/23/2023 03:23 PM    BUN 14 01/23/2023 03:23 PM    CREATININE 0.59 01/23/2023 03:23 PM    GFRAA >60 09/21/2022 11:23 AM    LABGLOM >60 01/23/2023 03:23 PM    GLUCOSE 134 01/23/2023 03:23 PM    PROT 7.5 12/19/2022 08:55 AM    CALCIUM 9.5 01/23/2023 03:23 PM    BILITOT 0.3 12/19/2022 08:55 AM    ALKPHOS 63 12/19/2022 08:55 AM    AST 10 12/19/2022 08:55 AM    ALT 18 12/19/2022 08:55 AM       POC Tests:   Recent Labs     02/06/23  0848   POCK 3.3*       Coags: No results found for: PROTIME, INR, APTT    HCG (If Applicable):   Lab Results   Component Value Date    PREGTESTUR Negative 02/06/2023        ABGs: No results found for: PHART, PO2ART, FXF6RQK, RAU4WTH, BEART, S6HLUEBR     Type & Screen (If Applicable):  No results found for: LABABO, LABRH    Drug/Infectious Status (If Applicable):  Lab Results   Component Value Date/Time    HEPCAB NONREACTIVE 09/21/2022 11:23 AM       COVID-19 Screening (If Applicable): No results found for: COVID19        Anesthesia Evaluation  Patient summary reviewed  Airway: Mallampati: II  TM distance: >3 FB   Neck ROM: full  Mouth opening: > = 3 FB   Dental: normal exam         Pulmonary:Negative Pulmonary ROS and normal exam  breath sounds clear to auscultation                             Cardiovascular:Negative CV ROS  Exercise tolerance: good (>4 METS),           Rhythm: regular  Rate: normal                    Neuro/Psych:   Negative Neuro/Psych ROS GI/Hepatic/Renal: Neg GI/Hepatic/Renal ROS            Endo/Other: Negative Endo/Other ROS   (+) malignancy/cancer (colon). Abdominal:             Vascular: negative vascular ROS. Other Findings:             Anesthesia Plan      general     ASA 1       Induction: intravenous. MIPS: Postoperative opioids intended and Prophylactic antiemetics administered. Anesthetic plan and risks discussed with patient.                         Esmer Snyder MD   2/6/2023

## 2023-02-07 LAB
ANION GAP SERPL CALC-SCNC: 6 MMOL/L (ref 2–11)
BASOPHILS # BLD: 0 K/UL (ref 0–0.2)
BASOPHILS NFR BLD: 0 % (ref 0–2)
BUN SERPL-MCNC: 13 MG/DL (ref 6–23)
CALCIUM SERPL-MCNC: 9 MG/DL (ref 8.3–10.4)
CHLORIDE SERPL-SCNC: 107 MMOL/L (ref 101–110)
CO2 SERPL-SCNC: 27 MMOL/L (ref 21–32)
CREAT SERPL-MCNC: 0.5 MG/DL (ref 0.6–1)
DIFFERENTIAL METHOD BLD: ABNORMAL
EOSINOPHIL # BLD: 0 K/UL (ref 0–0.8)
EOSINOPHIL NFR BLD: 0 % (ref 0.5–7.8)
ERYTHROCYTE [DISTWIDTH] IN BLOOD BY AUTOMATED COUNT: 12 % (ref 11.9–14.6)
GLUCOSE SERPL-MCNC: 104 MG/DL (ref 65–100)
HCT VFR BLD AUTO: 34.1 % (ref 35.8–46.3)
HGB BLD-MCNC: 11.6 G/DL (ref 11.7–15.4)
IMM GRANULOCYTES # BLD AUTO: 0 K/UL (ref 0–0.5)
IMM GRANULOCYTES NFR BLD AUTO: 0 % (ref 0–5)
LYMPHOCYTES # BLD: 0.7 K/UL (ref 0.5–4.6)
LYMPHOCYTES NFR BLD: 9 % (ref 13–44)
MAGNESIUM SERPL-MCNC: 2 MG/DL (ref 1.8–2.4)
MCH RBC QN AUTO: 32.5 PG (ref 26.1–32.9)
MCHC RBC AUTO-ENTMCNC: 34 G/DL (ref 31.4–35)
MCV RBC AUTO: 95.5 FL (ref 82–102)
MONOCYTES # BLD: 0.6 K/UL (ref 0.1–1.3)
MONOCYTES NFR BLD: 9 % (ref 4–12)
NEUTS SEG # BLD: 5.8 K/UL (ref 1.7–8.2)
NEUTS SEG NFR BLD: 82 % (ref 43–78)
NRBC # BLD: 0 K/UL (ref 0–0.2)
PHOSPHATE SERPL-MCNC: 4.3 MG/DL (ref 2.5–4.5)
PLATELET # BLD AUTO: 115 K/UL (ref 150–450)
PMV BLD AUTO: 11 FL (ref 9.4–12.3)
POTASSIUM SERPL-SCNC: 3.3 MMOL/L (ref 3.5–5.1)
RBC # BLD AUTO: 3.57 M/UL (ref 4.05–5.2)
SODIUM SERPL-SCNC: 140 MMOL/L (ref 133–143)
WBC # BLD AUTO: 7.1 K/UL (ref 4.3–11.1)

## 2023-02-07 PROCEDURE — 2500000003 HC RX 250 WO HCPCS: Performed by: PHYSICIAN ASSISTANT

## 2023-02-07 PROCEDURE — 83735 ASSAY OF MAGNESIUM: CPT

## 2023-02-07 PROCEDURE — 85025 COMPLETE CBC W/AUTO DIFF WBC: CPT

## 2023-02-07 PROCEDURE — 97162 PT EVAL MOD COMPLEX 30 MIN: CPT

## 2023-02-07 PROCEDURE — 84100 ASSAY OF PHOSPHORUS: CPT

## 2023-02-07 PROCEDURE — 36415 COLL VENOUS BLD VENIPUNCTURE: CPT

## 2023-02-07 PROCEDURE — 2580000003 HC RX 258: Performed by: PHYSICIAN ASSISTANT

## 2023-02-07 PROCEDURE — 1100000000 HC RM PRIVATE

## 2023-02-07 PROCEDURE — 6360000002 HC RX W HCPCS: Performed by: PHYSICIAN ASSISTANT

## 2023-02-07 PROCEDURE — 97530 THERAPEUTIC ACTIVITIES: CPT

## 2023-02-07 PROCEDURE — 6370000000 HC RX 637 (ALT 250 FOR IP): Performed by: PHYSICIAN ASSISTANT

## 2023-02-07 PROCEDURE — 80048 BASIC METABOLIC PNL TOTAL CA: CPT

## 2023-02-07 RX ADMIN — GABAPENTIN 300 MG: 300 CAPSULE ORAL at 08:45

## 2023-02-07 RX ADMIN — ONDANSETRON 4 MG: 2 INJECTION INTRAMUSCULAR; INTRAVENOUS at 08:50

## 2023-02-07 RX ADMIN — DOCUSATE SODIUM 100 MG: 100 CAPSULE, LIQUID FILLED ORAL at 08:45

## 2023-02-07 RX ADMIN — ENOXAPARIN SODIUM 40 MG: 100 INJECTION SUBCUTANEOUS at 08:45

## 2023-02-07 RX ADMIN — ACETAMINOPHEN 1000 MG: 500 TABLET, FILM COATED ORAL at 08:45

## 2023-02-07 RX ADMIN — METRONIDAZOLE 500 MG: 500 INJECTION, SOLUTION INTRAVENOUS at 01:33

## 2023-02-07 RX ADMIN — CEFAZOLIN SODIUM 2000 MG: 100 INJECTION, POWDER, LYOPHILIZED, FOR SOLUTION INTRAVENOUS at 01:33

## 2023-02-07 RX ADMIN — ONDANSETRON 4 MG: 2 INJECTION INTRAMUSCULAR; INTRAVENOUS at 21:16

## 2023-02-07 RX ADMIN — SODIUM CHLORIDE, PRESERVATIVE FREE 10 ML: 5 INJECTION INTRAVENOUS at 21:16

## 2023-02-07 RX ADMIN — DOCUSATE SODIUM 100 MG: 100 CAPSULE, LIQUID FILLED ORAL at 21:16

## 2023-02-07 RX ADMIN — GABAPENTIN 300 MG: 300 CAPSULE ORAL at 14:19

## 2023-02-07 RX ADMIN — NALOXEGOL OXALATE 25 MG: 25 TABLET, FILM COATED ORAL at 08:45

## 2023-02-07 RX ADMIN — GABAPENTIN 300 MG: 300 CAPSULE ORAL at 21:16

## 2023-02-07 RX ADMIN — PANTOPRAZOLE SODIUM 40 MG: 40 TABLET, DELAYED RELEASE ORAL at 08:45

## 2023-02-07 RX ADMIN — OXYCODONE 5 MG: 5 TABLET ORAL at 19:32

## 2023-02-07 ASSESSMENT — PAIN SCALES - GENERAL
PAINLEVEL_OUTOF10: 3
PAINLEVEL_OUTOF10: 3
PAINLEVEL_OUTOF10: 4
PAINLEVEL_OUTOF10: 3

## 2023-02-07 ASSESSMENT — PAIN DESCRIPTION - LOCATION
LOCATION: ABDOMEN

## 2023-02-07 ASSESSMENT — PAIN DESCRIPTION - PAIN TYPE
TYPE: SURGICAL PAIN
TYPE: SURGICAL PAIN

## 2023-02-07 ASSESSMENT — PAIN DESCRIPTION - ORIENTATION
ORIENTATION: LOWER
ORIENTATION: LOWER

## 2023-02-07 ASSESSMENT — PAIN DESCRIPTION - DESCRIPTORS
DESCRIPTORS: OTHER (COMMENT)
DESCRIPTORS: SORE

## 2023-02-07 ASSESSMENT — PAIN DESCRIPTION - FREQUENCY: FREQUENCY: CONTINUOUS

## 2023-02-07 ASSESSMENT — PAIN DESCRIPTION - ONSET: ONSET: ON-GOING

## 2023-02-07 NOTE — PROGRESS NOTES
ERAS End of Shift    1 Day Post-Op     Mason: No    Bowel Movement: No    Bowel Sounds: hypoactive    [unfilled]    Tolerating Diet?: Yes    Ensure Surgery Immunonutrion Shakes - 2 per day (Starting POD 1) ? No    Ambulated 3 times. Up to chair for 3 meals.     Lidocaine: No    PRN Pain Medications Used?: No    IS Used: Yes    [unfilled]     Signed By: Ghassan Hong RN     February 7, 2023

## 2023-02-07 NOTE — OP NOTE
[facility]  OPERATIVE REPORT    Name:  Julissa Rock  MR#:  [id number]  :  1975  ACCOUNT #:  [de-identified]  DATE OF SERVICE:  2023    PREOPERATIVE DIAGNOSIS:  Rectal cancer, status post chemoradiation. POSTOPERATIVE DIAGNOSIS:  Rectal cancer, status post chemoradiation. PROCEDURE PERFORMED:  1. Laparoscopic low anterior resection with primary low pelvic anastomosis. 2.  Total mesorectal excision. 3.  Laparoscopic mobilization of splenic flexure. 4.  Omental flap. SURGEON:  Monty Abdi MD    ASSISTANTCorebecca Ford    ANESTHESIA:  General.    COMPLICATIONS:  none. SPECIMENS REMOVED:  as above. IMPLANTS:  darnell x 1. ESTIMATED BLOOD LOSS:  50 mL. FINDING:  Her cancer is localized at the mid/proximal rectum, which is above peritoneal reflection. She does have a healthy colon, so I chose not to have diverting ileostomy as we planned. INDICATION:  This is a 48-year-old female who presented with rectal cancer status post total neoadjuvant chemoradiation. She has no evidence of metastatic disease. Surgery offered to her to complete her treatment. She understood risks and benefits and agreed to proceed. PROCEDURE:  After informed consent was obtained, the patient was brought into the operating room, left in supine position. General anesthesia was administered. The patient then placed on modified lithotomy position. Her abdomen and perineal area were prepped and draped in usual routine fashion. We first take infraumbilical incision, Brandon cannula inserted, pneumoperitoneum created. Two 5 mm trocar placed on each side of the mid abdomen and a 12 mm trocar placed in the right groin area. Peritoneal cavity was first inspected. No evidence of peritoneal disease. The patient was then placed in steep Trendelenburg position. The pelvis was adequately exposed.   She does have a large uterus and I used a Ronn Adie needle to put a suture at the fundus of the uterus and then this was tied to anterior abdominal wall as a retraction and then I started the pelvic dissection first.  The presacral fascia was opened and then I stayed right outside of the mesorectum and a very nice glistening presacral fascia was exposed and dissected. This carried posteriorly to the distal rectum and then laterally it was dissected off the pelvic wall and the lateral rectal stalk was taken, this was all done with a bipolar device. Anteriorly, I opened the peritoneal reflections, and I can see tattoo actually above the peritoneal reflections and the presumed cancer site should be proximal to the tattoo. I kept the dissection outside the mesorectum distally and then I finished that at the peritoneal reflection and this was done circumferentially. Then, Endo-BE stapler was taken and this is a 60 blue load and then the rectum was divided right at the peritoneal reflection. Then, two fires were used and then the rectum was flipped up and I continued dissection of the rectum off the iliac vessel and the retroperitoneum. I was able to identify the inferior mesentery artery and veins and also both ureter was identified clearly and then I used the vascular load of the Endo-BE stapler to divide inferior mesenteric trunk. This was further released sigmoid completely and then the sigmoid and descending colon was mobilized and the splenic flexure was taken down, so the left colon was fully medialized and the descending colon able to reach to the deep pelvis without any tension. Then I made a minilaparotomy incision by using her previous Pfannenstiel incision. Wound protector was used in the rectum. Sigmoid colon was able to be eviscerated from this small incision without any tension. The descending colon was divided with a pursestring device and the specimen was then removed and then the EEA anvil, this was 29 EEA anvil, was placed in the proximal colon. Pursestring suture was tied.   Then, the anvil was placed in the peritoneal cavity. The pneumoperitoneum was recreated. The surgical team then divided into two, my assistant Shirley and I scrubbed in between the legs. She did rectal exam and then placed a probe under my guidance, followed by HERACLIO stapler. The stapler was advanced under my direct guidance from above and then once it has reached to the rectal staple line is open, and I was then able to put the anvil to engage the stapler. The stapler was then nicely closed and fired and two nice donuts were obtained. The distal donut was sent for margin. Then, the staple line was tested under water and several puff of air inserted through the rectum by a proctoscope and the distal rectum was nicely distended without any evidence of air leak. I was able to mobilize the rectum and the tip of the rectum able to reach to the pelvis and then I placed a #19 Jonny drain in the pelvis just anterior to the rectum. Surgical field inspected and no evidence of bleeding or bowel injury. We were very satisfied with anastomosis and I felt diverting ileostomy could be avoided in this case. All the trocars were removed. The infraumbilical incision was closed on the fascia with 0 Vicryl stitch in figure-of-eight fashion. The mini-laparotomy incision was closed on the fascia with running 0 loop PDS and all skin incision closed with staples. The patient tolerated the procedure well and transferred to recovery room in stable condition. All the instrument and lap count correct.     Sunitha Rodriguez MD      [id number]  D:  02/06/2023 16:56  T:  02/07/2023 3:18  JOB #:  [id number]

## 2023-02-07 NOTE — CARE COORDINATION
POD 1, s/p ERA's laparoscopic low anterior colon resection with total mesorectal excision. Therapy recommending no needs at discharge. PCP referral made. CM following. ASSESSMENT NOTE    Attending Physician: Monty Abdi MD  Admit Problem: Rectal cancer [facility]) [C20]  Date/Time of Admission: 2/6/2023  8:15 AM  Problem List:  Patient Active Problem List   Diagnosis    Rectal cancer [facility])    Iron deficiency       Service Assessment  Patient Orientation Alert and Oriented   Cognition Alert   History Provided By Patient   Primary Caregiver Self   Accompanied By/Relationship mother and spouse   Support Systems Spouse/Significant Other, Parent   Patient's Healthcare Decision Maker is: Legal Next of Kin (spouse: connie navarro)   PCP Verified by CM No (uses [facility] on [address])   Last Visit to PCP     Prior Functional Level Independent in ADLs/IADLs   Current Functional Level Independent in ADLs/IADLs   Can patient return to prior living arrangement Yes   Ability to make needs known: Good   Family able to assist with home care needs: Yes   Would you like for me to discuss the discharge plan with any other family members/significant others, and if so, who?  Yes (family)   Financial Resources     Community Resources     CM/SW Referral No PCP     Social/Functional History  Lives With Spouse   Type of [address] One level   Home Access     Entrance Stairs - Number of Steps     Entrance Stairs - Rails     Bathroom Shower/Tub     Bathroom Toilet     Bathroom Equipment     Bathroom Accessibility     Home Equipment     Receives Help From Family   ADL Assistance Independent   Bath     Dressing     Grooming     Feeding     Toileting     [address]     Meal Prep     Laundry     Vacuuming     Cleaning     Gardening     Yard Work     Driving     Shopping          Other (Comment)     Homemaking Responsibilities     Meal Prep Responsibility     Laundry Responsibility     Cleaning Responsibility Bill Paying/Finance Responsibility     [facility]     Other (Comment)     Ambulation Assistance Independent   Transfer Assistance Independent   Active      Patient's  Info     Mode of Transportation     Education     Occupation Full time employment   Type of Occupation [employer]     Discharge [address] [facility],  Spouse/Significant Other, Family Members   Support Systems Spouse/Significant Other, Parent   Current Services Prior To Admission None   Potential Assistance Needed N/A   DME     DME     DME Ordered? No   Potential Assistance Purchasing Medications No   Meds-to-Beds: Does the patient want to have any new prescriptions delivered to bedside prior to discharge? Type of Home Care Services None   Patient expects to be discharged to: House   Follow Up Appointment: Best Day/Time     One/Two Story Residence:     # of Interior Steps     Height of Each Step (in)     [employer] Available     History of Falls? Services At/After Discharge  Transition of Care Consult (CM Consult): Discharge Planning   Internal Home Health     Internal Hospice     Reason Outside Agency [address]     Partner SNF     Reason Why Partner SNF Not Chosen     Internal Comfort Care     Reason Outside [address] Discharge None   [place] Resource Information Provided? No   Mode of Transport at Discharge [facility] Time of Discharge     Confirm Follow Up Transport Family     Condition of Participation: Discharge Planning  The plan for Transition of Care is related to the following treatment goals: return to baseline with family support   The Patient and/or Patient Representative was provided with a Choice of Provider? Patient   Name of the Patient Representative who was provided with the Choice of Provider and agrees with the Discharge Plan?      The Patient and/or Patient Representative Agree with the Discharge Plan? Yes   Freedom of Choice list was provided with basic dialogue that supports the individualized plan of care/goals, treatment preferences, and shares the quality data associated with the providers?  Yes     Documentation for Discharge Appeal  Discharge Appealed by     Date notified by QIO of appeal request:     Time notified by QIO of appeal request:     Detailed Notice of Discharge given to:     Date Notice of Discharge given:     Time Notice of Discharge given:     Date records sent to [address] [place] [address]     Time records sent to [address] [place] [address]     Date Notified of Outcome     Time Notified of Outcome     Outcome of appeal           Wenceslao Arellano RN 02/07/23 2:35 PM

## 2023-02-07 NOTE — PROGRESS NOTES
Admit Date: 2023    POD 1 Day Post-Op    Procedure:  Procedure(s):  ERA'S LAPAROSCOPIC LOW ANTERIOR COLON RESECTION WITH TOTAL MESORECTAL EXCISION    Subjective: The patient is laying comfortably in bed. She has not passed flatus. She is tolerating a clear liquid diet. Objective:       Vitals:    23 2348 23 0327 23 0357 23 0753   BP: 111/67 107/69  114/70   Pulse: 99 91  94   Resp: 18 18  18   Temp: 98.2 °F (36.8 °C) 98.1 °F (36.7 °C)  98.4 °F (36.9 °C)   TempSrc:    Oral   SpO2: 93% 94%  96%   Weight:   118 lb (53.5 kg)        Temp (24hrs), Av °F (36.7 °C), Min:97 °F (36.1 °C), Max:98.8 °F (37.1 °C)  . I&O reviewed as documented. Constitutional: Alert, oriented, cooperative patient in no acute distress; appears stated age    Eyes:Sclera are clear. EOMs intact  ENMT: no external lesions gross hearing normal; no obvious neck masses, no ear or lip lesions, nares normal  CV: RRR. Normal perfusion  Resp: No JVD. Breathing is  non-labored; no audible wheezing. GI: soft and non-distended, incisionally tender, incisions are clean, dry, and intact. Drain with SS drainage     Musculoskeletal: unremarkable with normal function. No embolic signs or cyanosis.    Neuro:  Oriented; moves all 4; no focal deficits  Psychiatric: normal affect and mood, no memory impairment    Labs:   Recent Results (from the past 24 hour(s))   TYPE AND SCREEN    Collection Time: 23  8:38 AM   Result Value Ref Range    Crossmatch expiration date 2023,2359     ABO/Rh A POSITIVE     Antibody Screen NEG    POC Sodium and Potassium    Collection Time: 23  8:48 AM   Result Value Ref Range    POC Potassium 3.3 (L) 3.5 - 5.1 MMOL/L   Basic Metabolic Panel    Collection Time: 23  6:20 AM   Result Value Ref Range    Sodium 140 133 - 143 mmol/L    Potassium 3.3 (L) 3.5 - 5.1 mmol/L    Chloride 107 101 - 110 mmol/L    CO2 27 21 - 32 mmol/L    Anion Gap 6 2 - 11 mmol/L    Glucose 104 (H) 65 - 100 mg/dL    BUN 13 6 - 23 MG/DL    Creatinine 0.50 (L) 0.6 - 1.0 MG/DL    Est, Glom Filt Rate >60 >60 ml/min/1.73m2    Calcium 9.0 8.3 - 10.4 MG/DL   Magnesium    Collection Time: 02/07/23  6:20 AM   Result Value Ref Range    Magnesium 2.0 1.8 - 2.4 mg/dL   Phosphorus    Collection Time: 02/07/23  6:20 AM   Result Value Ref Range    Phosphorus 4.3 2.5 - 4.5 MG/DL   CBC with Auto Differential    Collection Time: 02/07/23  6:20 AM   Result Value Ref Range    WBC 7.1 4.3 - 11.1 K/uL    RBC 3.57 (L) 4.05 - 5.2 M/uL    Hemoglobin 11.6 (L) 11.7 - 15.4 g/dL    Hematocrit 34.1 (L) 35.8 - 46.3 %    MCV 95.5 82 - 102 FL    MCH 32.5 26.1 - 32.9 PG    MCHC 34.0 31.4 - 35.0 g/dL    RDW 12.0 11.9 - 14.6 %    Platelets 383 (L) 400 - 450 K/uL    MPV 11.0 9.4 - 12.3 FL    nRBC 0.00 0.0 - 0.2 K/uL    Differential Type AUTOMATED      Seg Neutrophils 82 (H) 43 - 78 %    Lymphocytes 9 (L) 13 - 44 %    Monocytes 9 4.0 - 12.0 %    Eosinophils % 0 (L) 0.5 - 7.8 %    Basophils 0 0.0 - 2.0 %    Immature Granulocytes 0 0.0 - 5.0 %    Segs Absolute 5.8 1.7 - 8.2 K/UL    Absolute Lymph # 0.7 0.5 - 4.6 K/UL    Absolute Mono # 0.6 0.1 - 1.3 K/UL    Absolute Eos # 0.0 0.0 - 0.8 K/UL    Basophils Absolute 0.0 0.0 - 0.2 K/UL    Absolute Immature Granulocyte 0.0 0.0 - 0.5 K/UL           Assessment:     Principal Problem:    Rectal cancer (HCC)  Resolved Problems:    * No resolved hospital problems.  *        Plan/Recommendations/Medical Decision Making:     ERAS protocol  Continue CLD  Encouraged increased level of activity, PT consulted  IS   DVT prophylaxis, GI prophylaxis  Awaiting further return of bowel function    Yesenia Savannah, PA-C

## 2023-02-07 NOTE — PROGRESS NOTES
Unsuccessful attempt to contact the patient as no one answered the phone this time, rounded on nurse Noel Brito who stated that the staff used the interpreting mobile unit with the patient yesterday.     Thank you,         Denise Beltrán [facility]  [phone] (phone)

## 2023-02-07 NOTE — PROGRESS NOTES
Colorectal ERAS End of Shift Note    1 Day Post-Op    Mason: Yes    Bowel Movement: No    Bowel Sounds: hypoactive    Flatus: No    [unfilled]    Tolerating Diet?: No    Ensure Surgery Immunonutrion Shakes - 2 per day (POD 1-5) ? No    Ambulated 0 times. Up to chair for 0 meals. Lidocaine: Yes    PRN Pain Medications Used?: Yes    IS Used: No    Pt extremely nauseous and in pain throughout the night. Pt refused to take oral meds at this time. Rested at night after PRN medication given.      Ideal body weight: 50.1 kg (110 lb 7.2 oz)     Signed By: Yee Funes RN     February 7, 2023

## 2023-02-07 NOTE — PROGRESS NOTES
ACUTE PHYSICAL THERAPY GOALS:   (Developed with and agreed upon by patient and/or caregiver.)   Ms. Jill Huggins will perform all transfers independently in 4 days. Ms. Jill Huggins will perform gait 400 ft independently in 4 days. PHYSICAL THERAPY Initial Assessment, Daily Note, and AM  (Link to Caseload Tracking: PT Visit Days : 1  Acknowledge Orders  Time In/Out  PT Charge Capture  Rehab Caseload Tracker    Marina Oconnell is a 49 y.o. female   PRIMARY DIAGNOSIS: Rectal cancer ([address]  Rectal cancer ([address] [C20]  Procedure(s) (LRB):  ERA'S LAPAROSCOPIC LOW ANTERIOR COLON RESECTION WITH TOTAL MESORECTAL EXCISION (N/A)  1 Day Post-Op  Reason for Referral: Generalized Muscle Weakness (M62.81)  Difficulty in walking, Not elsewhere classified (R26.2)  Inpatient: Payor: [employer] / Plan: Serrano Gamma / Product Type: *No Product type* /     ASSESSMENT:     REHAB RECOMMENDATIONS:   Recommendation to date pending progress:  Setting:  No further skilled therapy after discharge from hospital    Equipment:    None     ASSESSMENT:  Ms. Jill Huggins presents after surgery with increased pain. She is very sweet and agreeable to ambulate. Her  is present. She is hurting mostly on her right lower quadrant so had her roll to the left and sit up which she did with cg. Sit to stand with cg. Gait 60 ft with cg. She had trunk flexion and guarding of her abdomen. When finished she was left up in the chair. Expect she will do well and have no needs at discharge.   Ms. Jill Huggins is functioning below baseline and is therefore appropriate for skilled PT to maximize her rehab potential..     [facility] - Inpatient   How much help is needed turning from your back to your side while in a flat bed without using bedrails?: A Little  How much help is needed moving from lying on your back to sitting on the side of a flat bed without using bedrails?: A Little  How much help is needed moving to and from a bed to a chair?: A Little  How much help is needed standing up from a chair using your arms?: A Little  How much help is needed walking in hospital room?: A Little  How much help is needed climbing 3-5 steps with a railing?: A Little  AM-PAC Inpatient Mobility Raw Score : 18  AM-PAC Inpatient T-Scale Score : 43.63  Mobility Inpatient CMS 0-100% Score: 46.58  Mobility Inpatient CMS G-Code Modifier : CK    SUBJECTIVE:   Ms. Brandan Mcbride states, \"lets go\"     Social/Functional Lives With: Spouse  Type of Home: House  Home Layout: One level  Ambulation Assistance: Independent  Transfer Assistance: Independent    OBJECTIVE:     PAIN: Aldridge Stare / O2: Aguilar Blowers / Jolit Leeks / Valdene Sleet:   Pre Treatment:          Post Treatment: sore Vitals        Oxygen      IV and GIGI Drain    RESTRICTIONS/PRECAUTIONS:                    GROSS EVALUATION: Intact Impaired (Comments):   AROM [x]     PROM []    Strength [x]     Balance [] Posture: Fair  Sitting - Static: Good  Sitting - Dynamic: Good  Standing - Static: Good  Standing - Dynamic: Fair   Posture [] N/A   Sensation []     Coordination []      Tone []     Edema []    Activity Tolerance []      []      COGNITION/  PERCEPTION: Intact Impaired (Comments):   Orientation [x]     Vision [x]     Hearing [x]     Cognition  [x]       MOBILITY: I Mod I S SBA CGA Min Mod Max Total  NT x2 Comments:   Bed Mobility    Rolling [] [] [] [] [x] [] [] [] [] [] []    Supine to Sit [] [] [] [] [x] [] [] [] [] [] []    Scooting [] [] [] [] [x] [] [] [] [] [] []    Sit to Supine [] [] [] [] [] [] [] [] [] [x] []    Transfers    Sit to Stand [] [] [] [] [x] [] [] [] [] [] []    Bed to Chair [] [] [] [] [x] [] [] [] [] [] []    Stand to Sit [] [] [] [] [x] [] [] [] [] [] []     [] [] [] [] [] [] [] [] [] [] []    I=Independent, Mod I=Modified Independent, S=Supervision, SBA=Standby Assistance, CGA=Contact Guard Assistance,   Min=Minimal Assistance, Mod=Moderate Assistance, Max=Maximal Assistance, Total=Total Assistance, NT=Not Tested    GAIT: I Mod I S SBA CGA Min Mod Max Total  NT x2 Comments:   Level of Assistance [] [] [] [] [x] [] [] [] [] [] []    Distance 60 feet    DME None    Gait Quality Decreased rosalba , Decreased step clearance, Decreased step length, and Trunk flexion    Weightbearing Status      Stairs      I=Independent, Mod I=Modified Independent, S=Supervision, SBA=Standby Assistance, CGA=Contact Guard Assistance,   Min=Minimal Assistance, Mod=Moderate Assistance, Max=Maximal Assistance, Total=Total Assistance, NT=Not Tested    PLAN:   FREQUENCY AND DURATION: 3 times/week for duration of hospital stay or until stated goals are met, whichever comes first.    THERAPY PROGNOSIS: Good    PROBLEM LIST:   (Skilled intervention is medically necessary to address:)  Decreased Activity Tolerance  Decreased AROM/PROM  Decreased Gait Ability  Decreased Strength  Decreased Transfer Abilities INTERVENTIONS PLANNED:   (Benefits and precautions of physical therapy have been discussed with the patient.)  Therapeutic Activity  Therapeutic Exercise/HEP  Neuromuscular Re-education  Gait Training  Education       TREATMENT:   EVALUATION: MODERATE COMPLEXITY: (Untimed Charge)    TREATMENT:   Therapeutic Activity (10 Minutes): Therapeutic activity included Supine to Sit, Transfer Training, Ambulation on level ground, Sitting balance , and Standing balance to improve functional Mobility. TREATMENT GRID:  N/A    AFTER TREATMENT PRECAUTIONS: Call light within reach, Chair, Needs within reach, RN notified, and Visitors at bedside    INTERDISCIPLINARY COLLABORATION:  RN/ PCT and PT/ PTA    EDUCATION: Education Given To: Patient; Family  Education Provided: Role of Therapy;Plan of Care  Education Method: Verbal  Barriers to Learning: None  Education Outcome: Verbalized understanding    TIME IN/OUT:  Time In: 1150  Time Out: [address]  Minutes: [address], PT

## 2023-02-07 NOTE — PROGRESS NOTES
Patient speaks [place] as their preferred language for their healthcare communication. If there are technical problems using the AMN mobil unit, please contact Language Services for interpretation at:    [facility] via phone # [phone]  General phone: [phone] ( [phone])  Email: [email or web address]    Please always document the use of interpreting services (name and/or 's ID number) in your clinical notes. Our interpreters are available for team members working with limited  English proficient (LEP) patients remotely, in person (if needed for special cases), as phone or video interpreters on the AMN Mobil units.       Adam Otto [address] [facility] "[employer] Services  [phone] (phone)

## 2023-02-08 LAB
ANION GAP SERPL CALC-SCNC: 9 MMOL/L (ref 2–11)
BASOPHILS # BLD: 0 K/UL (ref 0–0.2)
BASOPHILS NFR BLD: 0 % (ref 0–2)
BUN SERPL-MCNC: 14 MG/DL (ref 6–23)
CALCIUM SERPL-MCNC: 8.3 MG/DL (ref 8.3–10.4)
CHLORIDE SERPL-SCNC: 108 MMOL/L (ref 101–110)
CO2 SERPL-SCNC: 26 MMOL/L (ref 21–32)
CREAT SERPL-MCNC: 0.6 MG/DL (ref 0.6–1)
DIFFERENTIAL METHOD BLD: ABNORMAL
EOSINOPHIL # BLD: 0.1 K/UL (ref 0–0.8)
EOSINOPHIL NFR BLD: 1 % (ref 0.5–7.8)
ERYTHROCYTE [DISTWIDTH] IN BLOOD BY AUTOMATED COUNT: 12 % (ref 11.9–14.6)
GLUCOSE SERPL-MCNC: 104 MG/DL (ref 65–100)
HCT VFR BLD AUTO: 31.6 % (ref 35.8–46.3)
HGB BLD-MCNC: 10.6 G/DL (ref 11.7–15.4)
IMM GRANULOCYTES # BLD AUTO: 0 K/UL (ref 0–0.5)
IMM GRANULOCYTES NFR BLD AUTO: 0 % (ref 0–5)
LYMPHOCYTES # BLD: 0.5 K/UL (ref 0.5–4.6)
LYMPHOCYTES NFR BLD: 9 % (ref 13–44)
MAGNESIUM SERPL-MCNC: 2.2 MG/DL (ref 1.8–2.4)
MCH RBC QN AUTO: 32.2 PG (ref 26.1–32.9)
MCHC RBC AUTO-ENTMCNC: 33.5 G/DL (ref 31.4–35)
MCV RBC AUTO: 96 FL (ref 82–102)
MONOCYTES # BLD: 0.4 K/UL (ref 0.1–1.3)
MONOCYTES NFR BLD: 7 % (ref 4–12)
NEUTS SEG # BLD: 4.4 K/UL (ref 1.7–8.2)
NEUTS SEG NFR BLD: 83 % (ref 43–78)
NRBC # BLD: 0 K/UL (ref 0–0.2)
PHOSPHATE SERPL-MCNC: 2.6 MG/DL (ref 2.5–4.5)
PLATELET # BLD AUTO: 104 K/UL (ref 150–450)
PMV BLD AUTO: 10.7 FL (ref 9.4–12.3)
POTASSIUM SERPL-SCNC: 3.2 MMOL/L (ref 3.5–5.1)
RBC # BLD AUTO: 3.29 M/UL (ref 4.05–5.2)
SODIUM SERPL-SCNC: 143 MMOL/L (ref 133–143)
WBC # BLD AUTO: 5.3 K/UL (ref 4.3–11.1)

## 2023-02-08 PROCEDURE — 36415 COLL VENOUS BLD VENIPUNCTURE: CPT

## 2023-02-08 PROCEDURE — 1100000000 HC RM PRIVATE

## 2023-02-08 PROCEDURE — 6360000002 HC RX W HCPCS: Performed by: PHYSICIAN ASSISTANT

## 2023-02-08 PROCEDURE — 84100 ASSAY OF PHOSPHORUS: CPT

## 2023-02-08 PROCEDURE — 6370000000 HC RX 637 (ALT 250 FOR IP): Performed by: PHYSICIAN ASSISTANT

## 2023-02-08 PROCEDURE — 80048 BASIC METABOLIC PNL TOTAL CA: CPT

## 2023-02-08 PROCEDURE — 85025 COMPLETE CBC W/AUTO DIFF WBC: CPT

## 2023-02-08 PROCEDURE — 2580000003 HC RX 258: Performed by: PHYSICIAN ASSISTANT

## 2023-02-08 PROCEDURE — 83735 ASSAY OF MAGNESIUM: CPT

## 2023-02-08 RX ORDER — POTASSIUM CHLORIDE 20 MEQ/1
40 TABLET, EXTENDED RELEASE ORAL 2 TIMES DAILY WITH MEALS
Status: COMPLETED | OUTPATIENT
Start: 2023-02-08 | End: 2023-02-08

## 2023-02-08 RX ADMIN — ACETAMINOPHEN 1000 MG: 500 TABLET, FILM COATED ORAL at 16:52

## 2023-02-08 RX ADMIN — ACETAMINOPHEN 500 MG: 500 TABLET, FILM COATED ORAL at 07:56

## 2023-02-08 RX ADMIN — OXYCODONE 5 MG: 5 TABLET ORAL at 09:53

## 2023-02-08 RX ADMIN — NALOXEGOL OXALATE 25 MG: 25 TABLET, FILM COATED ORAL at 07:56

## 2023-02-08 RX ADMIN — POTASSIUM CHLORIDE 40 MEQ: 1500 TABLET, EXTENDED RELEASE ORAL at 16:52

## 2023-02-08 RX ADMIN — DOCUSATE SODIUM 100 MG: 100 CAPSULE, LIQUID FILLED ORAL at 07:56

## 2023-02-08 RX ADMIN — ENOXAPARIN SODIUM 40 MG: 100 INJECTION SUBCUTANEOUS at 07:57

## 2023-02-08 RX ADMIN — OXYCODONE 5 MG: 5 TABLET ORAL at 14:05

## 2023-02-08 RX ADMIN — PANTOPRAZOLE SODIUM 40 MG: 40 TABLET, DELAYED RELEASE ORAL at 07:57

## 2023-02-08 RX ADMIN — OXYCODONE 5 MG: 5 TABLET ORAL at 20:39

## 2023-02-08 RX ADMIN — DOCUSATE SODIUM 100 MG: 100 CAPSULE, LIQUID FILLED ORAL at 20:37

## 2023-02-08 RX ADMIN — POTASSIUM CHLORIDE 40 MEQ: 1500 TABLET, EXTENDED RELEASE ORAL at 09:45

## 2023-02-08 RX ADMIN — SODIUM CHLORIDE, PRESERVATIVE FREE 10 ML: 5 INJECTION INTRAVENOUS at 20:39

## 2023-02-08 ASSESSMENT — PAIN DESCRIPTION - PAIN TYPE: TYPE: SURGICAL PAIN

## 2023-02-08 ASSESSMENT — PAIN DESCRIPTION - LOCATION: LOCATION: ABDOMEN

## 2023-02-08 ASSESSMENT — PAIN DESCRIPTION - FREQUENCY: FREQUENCY: CONTINUOUS

## 2023-02-08 ASSESSMENT — PAIN DESCRIPTION - ONSET: ONSET: ON-GOING

## 2023-02-08 ASSESSMENT — PAIN DESCRIPTION - ORIENTATION: ORIENTATION: LOWER

## 2023-02-08 ASSESSMENT — PAIN SCALES - GENERAL: PAINLEVEL_OUTOF10: 3

## 2023-02-08 NOTE — PROGRESS NOTES
Went to see MsDeedee Dewey Kasandragreg but she had just recently been back from walking with her family. Will check back later as time allows.   [address] [facility], PT

## 2023-02-08 NOTE — PROGRESS NOTES
Admit Date: 2023    POD 2 Days Post-Op    Procedure:  Procedure(s):  ERA'S LAPAROSCOPIC LOW ANTERIOR COLON RESECTION WITH TOTAL MESORECTAL EXCISION    Subjective: The patient is laying comfortably in bed. She has passed flatus. She has not had a BM. She has had some nausea. She states she has been tolerating some clears. Objective:       Vitals:    23 2313 23 0316 23 0651 23 0733   BP: 107/63 124/63  106/70   Pulse: 90 86  85   Resp: 18 18  18   Temp: 98.2 °F (36.8 °C) 98.2 °F (36.8 °C)  98.1 °F (36.7 °C)   TempSrc:    Oral   SpO2: 96% 95%  95%   Weight:   113 lb (51.3 kg)        Temp (24hrs), Av.3 °F (36.8 °C), Min:98.1 °F (36.7 °C), Max:98.6 °F (37 °C)  . I&O reviewed as documented. Constitutional: Alert, oriented, cooperative patient in no acute distress; appears stated age    Eyes:Sclera are clear. EOMs intact  ENMT: no external lesions gross hearing normal; no obvious neck masses, no ear or lip lesions, nares normal  CV: RRR. Normal perfusion  Resp: No JVD. Breathing is  non-labored; no audible wheezing. GI: soft and non-distended, incisionally tender, incisions are clean, dry, and intact. Drain with SS drainage     Musculoskeletal: unremarkable with normal function. No embolic signs or cyanosis.    Neuro:  Oriented; moves all 4; no focal deficits  Psychiatric: normal affect and mood, no memory impairment    Labs:   Recent Results (from the past 24 hour(s))   Basic Metabolic Panel    Collection Time: 23  6:57 AM   Result Value Ref Range    Sodium 143 133 - 143 mmol/L    Potassium 3.2 (L) 3.5 - 5.1 mmol/L    Chloride 108 101 - 110 mmol/L    CO2 26 21 - 32 mmol/L    Anion Gap 9 2 - 11 mmol/L    Glucose 104 (H) 65 - 100 mg/dL    BUN 14 6 - 23 MG/DL    Creatinine 0.60 0.6 - 1.0 MG/DL    Est, Glom Filt Rate >60 >60 ml/min/1.73m2    Calcium 8.3 8.3 - 10.4 MG/DL   Magnesium    Collection Time: 23  6:57 AM   Result Value Ref Range    Magnesium 2.2 1.8 - 2.4 mg/dL   Phosphorus    Collection Time: 02/08/23  6:57 AM   Result Value Ref Range    Phosphorus 2.6 2.5 - 4.5 MG/DL   CBC with Auto Differential    Collection Time: 02/08/23  6:57 AM   Result Value Ref Range    WBC 5.3 4.3 - 11.1 K/uL    RBC 3.29 (L) 4.05 - 5.2 M/uL    Hemoglobin 10.6 (L) 11.7 - 15.4 g/dL    Hematocrit 31.6 (L) 35.8 - 46.3 %    MCV 96.0 82 - 102 FL    MCH 32.2 26.1 - 32.9 PG    MCHC 33.5 31.4 - 35.0 g/dL    RDW 12.0 11.9 - 14.6 %    Platelets 882 (L) 048 - 450 K/uL    MPV 10.7 9.4 - 12.3 FL    nRBC 0.00 0.0 - 0.2 K/uL    Differential Type AUTOMATED      Seg Neutrophils 83 (H) 43 - 78 %    Lymphocytes 9 (L) 13 - 44 %    Monocytes 7 4.0 - 12.0 %    Eosinophils % 1 0.5 - 7.8 %    Basophils 0 0.0 - 2.0 %    Immature Granulocytes 0 0.0 - 5.0 %    Segs Absolute 4.4 1.7 - 8.2 K/UL    Absolute Lymph # 0.5 0.5 - 4.6 K/UL    Absolute Mono # 0.4 0.1 - 1.3 K/UL    Absolute Eos # 0.1 0.0 - 0.8 K/UL    Basophils Absolute 0.0 0.0 - 0.2 K/UL    Absolute Immature Granulocyte 0.0 0.0 - 0.5 K/UL           Assessment:     Principal Problem:    Rectal cancer (HCC)  Resolved Problems:    * No resolved hospital problems.  *        Plan/Recommendations/Medical Decision Making:     ERAS protocol  Advance to FLD  Encouraged increased level of activity, PT following  IS   DVT prophylaxis, GI prophylaxis  Awaiting further return of bowel function    Kiera Verdugo PA-C

## 2023-02-08 NOTE — CARE COORDINATION
[facility] 2d    [facility] Patient POD 2, s/p ERA's laparoscopic low anterior colon resection with total mesorectal excision. Tolerating diet. Awaiting return of bowel. CM following.

## 2023-02-08 NOTE — PROGRESS NOTES
Colorectal ERAS End of Shift Note    2 Days Post-Op    Mason: No    Bowel Movement: Yes    Bowel Sounds: normal    Flatus: Yes    [unfilled]    Tolerating Diet?: Yes    Ensure Surgery Immunonutrion Shakes - 2 per day (POD 1-5) ? No (1)    Ambulated 2x times. Up to chair for 2 meals.     Lidocaine: No    PRN Pain Medications Used?: Yes    IS Used: Yes    Ideal body weight: 50.1 kg (110 lb 7.2 oz)     Signed By: Yesi Blackburn RN     February 8, 2023

## 2023-02-08 NOTE — PROGRESS NOTES
Colorectal ERAS End of Shift Note    2 Days Post-Op    Mason: No    Bowel Movement: No    Bowel Sounds: hypoactive    Flatus: No    [unfilled]    Tolerating Diet?: Yes    Ensure Surgery Immunonutrion Shakes - 2 per day (POD 1-5) ? No    Ambulated 1 times. Up to chair for 0 meals.     Lidocaine: No    PRN Pain Medications Used?: Yes    IS Used: Yes    Ideal body weight: 50.1 kg (110 lb 7.2 oz)     Signed By: Alex Cordoba RN     February 8, 2023

## 2023-02-09 VITALS
OXYGEN SATURATION: 98 % | HEART RATE: 80 BPM | WEIGHT: 117.2 LBS | BODY MASS INDEX: 21.44 KG/M2 | RESPIRATION RATE: 18 BRPM | DIASTOLIC BLOOD PRESSURE: 75 MMHG | TEMPERATURE: 98.1 F | SYSTOLIC BLOOD PRESSURE: 112 MMHG

## 2023-02-09 LAB
ANION GAP SERPL CALC-SCNC: 8 MMOL/L (ref 2–11)
BASOPHILS # BLD: 0 K/UL (ref 0–0.2)
BASOPHILS NFR BLD: 0 % (ref 0–2)
BUN SERPL-MCNC: 12 MG/DL (ref 6–23)
CALCIUM SERPL-MCNC: 8.7 MG/DL (ref 8.3–10.4)
CHLORIDE SERPL-SCNC: 106 MMOL/L (ref 101–110)
CO2 SERPL-SCNC: 29 MMOL/L (ref 21–32)
CREAT SERPL-MCNC: 0.5 MG/DL (ref 0.6–1)
DIFFERENTIAL METHOD BLD: ABNORMAL
EOSINOPHIL # BLD: 0.2 K/UL (ref 0–0.8)
EOSINOPHIL NFR BLD: 5 % (ref 0.5–7.8)
ERYTHROCYTE [DISTWIDTH] IN BLOOD BY AUTOMATED COUNT: 11.9 % (ref 11.9–14.6)
GLUCOSE SERPL-MCNC: 127 MG/DL (ref 65–100)
HCT VFR BLD AUTO: 33 % (ref 35.8–46.3)
HGB BLD-MCNC: 11.2 G/DL (ref 11.7–15.4)
IMM GRANULOCYTES # BLD AUTO: 0 K/UL (ref 0–0.5)
IMM GRANULOCYTES NFR BLD AUTO: 0 % (ref 0–5)
LYMPHOCYTES # BLD: 0.6 K/UL (ref 0.5–4.6)
LYMPHOCYTES NFR BLD: 13 % (ref 13–44)
MAGNESIUM SERPL-MCNC: 2.4 MG/DL (ref 1.8–2.4)
MCH RBC QN AUTO: 32.6 PG (ref 26.1–32.9)
MCHC RBC AUTO-ENTMCNC: 33.9 G/DL (ref 31.4–35)
MCV RBC AUTO: 95.9 FL (ref 82–102)
MONOCYTES # BLD: 0.4 K/UL (ref 0.1–1.3)
MONOCYTES NFR BLD: 8 % (ref 4–12)
NEUTS SEG # BLD: 3.2 K/UL (ref 1.7–8.2)
NEUTS SEG NFR BLD: 74 % (ref 43–78)
NRBC # BLD: 0 K/UL (ref 0–0.2)
PHOSPHATE SERPL-MCNC: 3.5 MG/DL (ref 2.5–4.5)
PLATELET # BLD AUTO: 116 K/UL (ref 150–450)
PMV BLD AUTO: 11 FL (ref 9.4–12.3)
POTASSIUM SERPL-SCNC: 3.8 MMOL/L (ref 3.5–5.1)
RBC # BLD AUTO: 3.44 M/UL (ref 4.05–5.2)
SODIUM SERPL-SCNC: 143 MMOL/L (ref 133–143)
WBC # BLD AUTO: 4.4 K/UL (ref 4.3–11.1)

## 2023-02-09 PROCEDURE — 85025 COMPLETE CBC W/AUTO DIFF WBC: CPT

## 2023-02-09 PROCEDURE — 6370000000 HC RX 637 (ALT 250 FOR IP): Performed by: PHYSICIAN ASSISTANT

## 2023-02-09 PROCEDURE — 80048 BASIC METABOLIC PNL TOTAL CA: CPT

## 2023-02-09 PROCEDURE — 84100 ASSAY OF PHOSPHORUS: CPT

## 2023-02-09 PROCEDURE — 83735 ASSAY OF MAGNESIUM: CPT

## 2023-02-09 PROCEDURE — 36415 COLL VENOUS BLD VENIPUNCTURE: CPT

## 2023-02-09 RX ORDER — POTASSIUM CHLORIDE 20 MEQ/1
40 TABLET, EXTENDED RELEASE ORAL DAILY
Qty: 6 TABLET | Refills: 0 | Status: SHIPPED | OUTPATIENT
Start: 2023-02-09

## 2023-02-09 RX ORDER — GABAPENTIN 300 MG/1
300 CAPSULE ORAL 3 TIMES DAILY
Qty: 45 CAPSULE | Refills: 0 | Status: SHIPPED | OUTPATIENT
Start: 2023-02-09 | End: 2023-02-24

## 2023-02-09 RX ORDER — PSEUDOEPHEDRINE HCL 30 MG
100 TABLET ORAL 2 TIMES DAILY PRN
COMMUNITY
Start: 2023-02-09

## 2023-02-09 RX ORDER — OXYCODONE HYDROCHLORIDE 5 MG/1
5 TABLET ORAL EVERY 4 HOURS PRN
Qty: 12 TABLET | Refills: 0 | Status: SHIPPED | OUTPATIENT
Start: 2023-02-09 | End: 2023-02-12

## 2023-02-09 RX ADMIN — PANTOPRAZOLE SODIUM 40 MG: 40 TABLET, DELAYED RELEASE ORAL at 09:53

## 2023-02-09 RX ADMIN — NALOXEGOL OXALATE 25 MG: 25 TABLET, FILM COATED ORAL at 09:53

## 2023-02-09 RX ADMIN — DOCUSATE SODIUM 100 MG: 100 CAPSULE, LIQUID FILLED ORAL at 09:53

## 2023-02-09 RX ADMIN — OXYCODONE 5 MG: 5 TABLET ORAL at 09:53

## 2023-02-09 RX ADMIN — ACETAMINOPHEN 1000 MG: 500 TABLET, FILM COATED ORAL at 09:53

## 2023-02-09 NOTE — PROGRESS NOTES
D/C instructions reviewed with pt, , and family at bedside, all questions answered. PIV and drain removed per order. Incisional care reviewed and given supplies for wound care at home. Transported to car by tech.

## 2023-02-09 NOTE — DISCHARGE INSTRUCTIONS
Remain on soft diet  Oxycodone and gabapentin for pain  Continue potassium for 3 days  No lifting >20 lbs  May shower  Follow up in our office in 7-10 days         Learning About Enhanced Recovery After Surgery (ERAS): After Your Surgery  What is it? Enhanced recovery after surgery (ERAS) is a set of guidelines. Hospitals and surgery centers use it help you prepare for surgery, control pain, shorten your stay, and recover sooner. Before surgery, you'll eat healthy food and drink enough fluids. Afterward, you'll eat and get out of bed as soon as possible. What can you expect after surgery? You will drink water and eat healthy foods as soon as you can after surgery. Your care team will urge you to sit up in a chair while you eat. The doctor or nurse will encourage you to get up and walk as soon as you can. The more you can move, or at least sit up in a chair, the better. Your doctor may give you a shot of medicine. This is to block the pain from the affected area of your body. You may also be given medicine like acetaminophen or ibuprofen. If opioids are used, your doctor will give you the lowest dose for the shortest possible time. Opioids can make your recovery harder. And they can be less safe than other medicines. Current as of: June 6, 2022               Content Version: 13.5  © 2006-2022 Healthwise, Inc[employer]. Care instructions adapted under license by [facility] ([facility]). If you have questions about a medical condition or this instruction, always ask your healthcare professional. [place] [address] any warranty or liability for your use of this information. Wound Check: Care Instructions  Your Care Instructions  People have wounds that need care for many reasons. You may have a cut that needs care after surgery. You may have a cut or puncture wound from an accident. Or you may have a wound because of a condition like diabetes.   Whatever the cause of your wound, there are things you can do to care for it at home. Your doctor may also want you to come back for a wound check. The wound check lets the doctor know how your wound is healing and if you need more treatment. Follow-up care is a key part of your treatment and safety. Be sure to make and go to all appointments, and call your doctor if you are having problems. It's also a good idea to know your test results and keep a list of the medicines you take. How can you care for yourself at home? If your doctor told you how to care for your wound, follow your doctor's instructions. Keep the wound dry for the first 24 to 48 hours. After this, you can shower if your doctor okays it. Pat the wound dry. Be safe with medicines. Read and follow all instructions on the label. If the doctor gave you a prescription medicine for pain, take it as prescribed. If you are not taking a prescription pain medicine, ask your doctor if you can take an over-the-counter medicine. If your doctor prescribed antibiotics, take them as directed. Do not stop taking them just because you feel better. You need to take the full course of antibiotics. If you have stitches, do not remove them on your own. Your doctor will tell you when to come back to have them removed. If you have Steri-Strips, leave them on until they fall off. If possible, prop up the injured area on a pillow anytime you sit or lie down during the next 3 days. Try to keep it above the level of your heart. This will help reduce swelling. When should you call for help? Call your doctor now or seek immediate medical care if:    You have new pain, or the pain gets worse. The skin near the wound is cold or pale or changes color. You have tingling, weakness, or numbness near the wound. The wound starts to bleed, and blood soaks through the bandage. Oozing small amounts of blood is normal.     You have symptoms of infection, such as: Increased pain, swelling, warmth, or redness.   Red streaks leading from the wound. Pus draining from the wound. A fever. Watch closely for changes in your health, and be sure to contact your doctor if:    You do not get better as expected. Where can you learn more? Go to http://www.woods.com/ and enter P342 to learn more about \"Wound Check: Care Instructions. \"  Current as of: March 9, 2022               Content Version: 13.5  © 2006-2022 [employer]. Care instructions adapted under license by [facility] ([facility]). If you have questions about a medical condition or this instruction, always ask your healthcare professional. [address] any warranty or liability for your use of this information.

## 2023-02-09 NOTE — CARE COORDINATION
Patient with discharge orders today. No supportive needs identified to CM. Patient's family at bedside, to provide transportation home. Patient has met all treatment goals and milestones. CM following until discharged. ASSESSMENT NOTE    Attending Physician: Shavonne Burleson MD  Admit Problem: Rectal cancer [facility]) [C20]  Date/Time of Admission: 2/6/2023  8:15 AM  Problem List:  Patient Active Problem List   Diagnosis    Rectal cancer [facility])    Iron deficiency       Service Assessment  Patient Orientation Alert and Oriented   Cognition Alert   History Provided By Patient   Primary Caregiver Self   Accompanied By/Relationship mother and spouse   Support Systems Spouse/Significant Other, Parent   Patient's Healthcare Decision Maker is: Legal Next of Kin (spouse: connie navarro)   PCP Verified by CM No (uses [facility] on [address])   Last Visit to PCP     Prior Functional Level Independent in ADLs/IADLs   Current Functional Level Independent in ADLs/IADLs   Can patient return to prior living arrangement Yes   Ability to make needs known: Good   Family able to assist with home care needs: Yes   Would you like for me to discuss the discharge plan with any other family members/significant others, and if so, who?  Yes (family)   Financial Resources     Community Resources     CM/SW Referral No PCP     Social/Functional History  Lives With Spouse   Type of [address] One level   Home Access     Entrance Stairs - Number of Steps     Entrance Stairs - Rails     Bathroom Shower/Tub     Bathroom Toilet     Bathroom Equipment     Bathroom Accessibility     Home Equipment     Receives Help From Family   ADL Assistance Independent   Bath     Dressing     Grooming     Feeding     Toileting     [address]     Meal Prep     Laundry     Vacuuming     Cleaning     Gardening     Yard Work     Driving     Shopping          Other (Comment)     Homemaking Responsibilities     Meal Prep Responsibility [address] Paying/Finance Responsibility     Shopping Responsibility     Dependent Care Responsibility     Health Care Management     Other (Comment)     Ambulation Assistance Independent   Transfer Assistance Independent   Active      Patient's  Info     Mode of Transportation     Education     Occupation Full time employment   Type of Occupation [employer]     Discharge [address] Spouse/Significant Other, Family Members   Support Systems Spouse/Significant Other, Parent   Current Services Prior To Admission None   Potential Assistance Needed N/A   DME     DME     DME Ordered? No   Potential Assistance Purchasing Medications No   Meds-to-Beds: Does the patient want to have any new prescriptions delivered to bedside prior to discharge? Type of Home Care Services None   Patient expects to be discharged to: House   Follow Up Appointment: Best Day/Time     One/Two Story Residence:     # of Interior Steps     Height of Each Step (in)     [employer] Available     History of Falls? Services At/After Discharge  Transition of Care Consult (CM Consult): Discharge Planning   Internal Home Health     Internal Hospice     Reason Outside Agency [address]     Partner SNF     Reason Why Partner SNF Not Chosen     Internal Comfort Care     Reason Outside [address] Discharge None    Resource Information Provided? No   Mode of Transport at Discharge [facility] Time of Discharge     Confirm Follow Up Transport Family     Condition of Participation: Discharge Planning  The plan for Transition of Care is related to the following treatment goals: return to baseline with family support   The Patient and/or Patient Representative was provided with a Choice of Provider?  Patient   Name of the Patient Representative who was provided with the Choice of Provider and agrees with the Discharge Plan? The Patient and/or Patient Representative Agree with the Discharge Plan? Yes   Freedom of Choice list was provided with basic dialogue that supports the individualized plan of care/goals, treatment preferences, and shares the quality data associated with the providers?  Yes     Documentation for Discharge Appeal  Discharge Appealed by     Date notified by QIO of appeal request:     Time notified by QIO of appeal request:     Detailed Notice of Discharge given to:     Date Notice of Discharge given:     Time Notice of Discharge given:     Date records sent to [address] [place] [address]     Time records sent to [address] [place] [address]     Date Notified of Outcome     Time Notified of Outcome     Outcome of appeal           Tilford Gitelman, RN 02/09/23 8:40 AM

## 2023-02-09 NOTE — PROGRESS NOTES
Patient speaks [place] as their preferred language for their healthcare communication. If there are technical problems using the AMN mobil unit, please contact Language Services for interpretation at:    [facility] via phone # [phone]  General phone: [phone] ( [phone])  Email: [email or web address]    Please always document the use of interpreting services (name and/or 's ID number) in your clinical notes. Our interpreters are available for team members working with limited  English proficient (LEP) patients remotely, in person (if needed for special cases), as phone or video interpreters on the AMN Mobil units.       Geraldine Singh [facility] [employer]  [phone] (phone)

## 2023-02-09 NOTE — PROGRESS NOTES
Colorectal ERAS End of Shift Note    3 Days Post-Op    Mason: No    Bowel Movement: No    Bowel Sounds: hypoactive    Flatus: Yes    [unfilled]    Tolerating Diet?: Yes    Ensure Surgery Immunonutrion Shakes - 2 per day (POD 1-5) ? Yes    Ambulated 0 times. Up to chair for 0 meals.     Lidocaine: No    PRN Pain Medications Used?: Yes    IS Used: Yes    Ideal body weight: 50.1 kg (110 lb 7.2 oz)     Signed By: Alex Cordoba RN     February 9, 2023

## 2023-02-09 NOTE — DISCHARGE SUMMARY
Elian Dugan  MRN: [id number]     : 1975     Age: 49 y.o. Admit date: 2023     Discharge date: 2023   Attending Physician: Dr. Rema Nuno MD  Primary Discharge Diagnosis:   Principal Problem:    Rectal cancer [facility])  Resolved Problems:    * No resolved hospital problems. *    Primary Operations or Procedures Performed :  Procedure(s):  ERA'S LAPAROSCOPIC LOW ANTERIOR COLON RESECTION WITH TOTAL MESORECTAL EXCISION     Brief History and Reason for Admission: Elian Dugan was admitted with the following history of present illness. This patient was originally seen in July for a newly diagnosed rectal cancer on colonoscopy. She was found to have a 5 cm 1/2 circumferential friable mass in the rectum with biopsy demonstrating at least carcinoma in situ. She then underwent an EUS on 2022 which revealed a mass at about 8-13cm from the anal verge occupying 60% of the circumference, staged [id number] on EUS. The patient was arranged to start neoadjuvant treatment that was started in August. She completed 6 cycles of iv chemotherapy, then proceeded with chemoradiation. She completed her radiation on 2022. The patient states that at this time her bowel movements have been improving. She states that she will have some minor pain after Bms. She will have some occasional suprapubic pain as well. She is tolerating a diet. She does have some decreased appetite. She has no significant past medical history. She does not smoke or drink. She has a past surgical history of a . Hospital Course: The patient progressed satisfactorily meeting milestones necessary for successful discharge including tolerating a diet, adequate mobility, adequate pain control, and active bowel function. Patient was deemed a good candidate for discharge at the time of morning rounding. They are to follow up as indicated in their provided discharge paperwork.  The patient helped develop and voices understanding with the plan of care. They are amenable without reservations at this time to moving forward with discharge. Condition at Discharge: good    Discharge Medications:   Current Discharge Medication List        START taking these medications    Details   oxyCODONE (ROXICODONE) 5 MG immediate release tablet Take 1 tablet by mouth every 4 hours as needed for Pain for up to 3 days. Max Daily Amount: 30 mg  Qty: 12 tablet, Refills: 0    Comments: Reduce doses taken as pain becomes manageable  Associated Diagnoses: Rectal cancer (HCC)      gabapentin (NEURONTIN) 300 MG capsule Take 1 capsule by mouth 3 times daily for 15 days.   Qty: 45 capsule, Refills: 0      docusate sodium (COLACE, DULCOLAX) 100 MG CAPS Take 100 mg by mouth 2 times daily as needed for Constipation      potassium chloride (KLOR-CON M) 20 MEQ extended release tablet Take 2 tablets by mouth daily  Qty: 6 tablet, Refills: 0           STOP taking these medications       Hydrocort-Pramoxine, Perianal, (ANALPRAM-HC) 2.5-1 % rectal cream Comments:   Reason for Stopping:         lidocaine-prilocaine (EMLA) 2.5-2.5 % cream Comments:   Reason for Stopping:                 Disposition/Discharge Instructions/Follow-up Care:      Remain on soft diet  Oxycodone and gabapentin for pain  Continue potassium for 3 days  No lifting >20 lbs  May shower  Follow up in our office in 7-10 days    Signed:  Latasha Rivera PA-C  2/9/2023  8:16 AM

## 2023-02-10 ENCOUNTER — TELEPHONE (OUTPATIENT)
Dept: INTERNAL MEDICINE CLINIC | Facility: CLINIC | Age: 48
End: 2023-02-10

## 2023-02-10 NOTE — TELEPHONE ENCOUNTER
Called patient to schedule TCV appt following discharge from [facility] 02/09/2023. Called through Language services ID #[id number]    Patient has appt 02/13/2023 with Hematology Oncology    Per spouse they would like to est with PCP but would like Information sent on providers to them so they can pick who they may want to establish care with. .    Information emailed to patient with list of [facility] providers.

## 2023-02-13 ENCOUNTER — HOSPITAL ENCOUNTER (OUTPATIENT)
Dept: LAB | Age: 48
Discharge: HOME | End: 2023-02-16
Payer: COMMERCIAL

## 2023-02-13 ENCOUNTER — OFFICE VISIT (OUTPATIENT)
Dept: ONCOLOGY | Age: 48
End: 2023-02-13
Payer: COMMERCIAL

## 2023-02-13 VITALS
OXYGEN SATURATION: 97 % | SYSTOLIC BLOOD PRESSURE: 123 MMHG | HEART RATE: 90 BPM | DIASTOLIC BLOOD PRESSURE: 74 MMHG | TEMPERATURE: 97.3 F | HEIGHT: 61 IN | RESPIRATION RATE: 18 BRPM | BODY MASS INDEX: 21.81 KG/M2 | WEIGHT: 115.5 LBS

## 2023-02-13 DIAGNOSIS — C20 RECTAL CANCER (HCC): ICD-10-CM

## 2023-02-13 DIAGNOSIS — C20 RECTAL CANCER (HCC): Primary | ICD-10-CM

## 2023-02-13 LAB
ALBUMIN SERPL-MCNC: 3.6 G/DL (ref 3.5–5)
ALBUMIN/GLOB SERPL: 1.1 (ref 0.4–1.6)
ALP SERPL-CCNC: 56 U/L (ref 50–136)
ALT SERPL-CCNC: 30 U/L (ref 12–65)
ANION GAP SERPL CALC-SCNC: 7 MMOL/L (ref 2–11)
AST SERPL-CCNC: 10 U/L (ref 15–37)
BASOPHILS # BLD: 0 K/UL (ref 0–0.2)
BASOPHILS NFR BLD: 0 % (ref 0–2)
BILIRUB SERPL-MCNC: 0.6 MG/DL (ref 0.2–1.1)
BUN SERPL-MCNC: 14 MG/DL (ref 6–23)
CALCIUM SERPL-MCNC: 9.4 MG/DL (ref 8.3–10.4)
CEA SERPL-MCNC: 0.9 NG/ML (ref 0–3)
CHLORIDE SERPL-SCNC: 105 MMOL/L (ref 101–110)
CO2 SERPL-SCNC: 29 MMOL/L (ref 21–32)
CREAT SERPL-MCNC: 0.6 MG/DL (ref 0.6–1)
DIFFERENTIAL METHOD BLD: ABNORMAL
EOSINOPHIL # BLD: 0.2 K/UL (ref 0–0.8)
EOSINOPHIL NFR BLD: 5 % (ref 0.5–7.8)
ERYTHROCYTE [DISTWIDTH] IN BLOOD BY AUTOMATED COUNT: 12.2 % (ref 11.9–14.6)
FERRITIN SERPL-MCNC: 415 NG/ML (ref 8–388)
GLOBULIN SER CALC-MCNC: 3.4 G/DL (ref 2.8–4.5)
GLUCOSE SERPL-MCNC: 116 MG/DL (ref 65–100)
HCT VFR BLD AUTO: 38.2 % (ref 35.8–46.3)
HGB BLD-MCNC: 12.8 G/DL (ref 11.7–15.4)
IMM GRANULOCYTES # BLD AUTO: 0.1 K/UL (ref 0–0.5)
IMM GRANULOCYTES NFR BLD AUTO: 1 % (ref 0–5)
IRON SATN MFR SERPL: 27 %
IRON SERPL-MCNC: 74 UG/DL (ref 35–150)
LYMPHOCYTES # BLD: 0.6 K/UL (ref 0.5–4.6)
LYMPHOCYTES NFR BLD: 12 % (ref 13–44)
MAGNESIUM SERPL-MCNC: 2.2 MG/DL (ref 1.8–2.4)
MCH RBC QN AUTO: 31.9 PG (ref 26.1–32.9)
MCHC RBC AUTO-ENTMCNC: 33.5 G/DL (ref 31.4–35)
MCV RBC AUTO: 95.3 FL (ref 82–102)
MONOCYTES # BLD: 0.4 K/UL (ref 0.1–1.3)
MONOCYTES NFR BLD: 8 % (ref 4–12)
NEUTS SEG # BLD: 3.7 K/UL (ref 1.7–8.2)
NEUTS SEG NFR BLD: 74 % (ref 43–78)
NRBC # BLD: 0 K/UL (ref 0–0.2)
PLATELET # BLD AUTO: 192 K/UL (ref 150–450)
PMV BLD AUTO: 10 FL (ref 9.4–12.3)
POTASSIUM SERPL-SCNC: 3.8 MMOL/L (ref 3.5–5.1)
PROT SERPL-MCNC: 7 G/DL (ref 6.3–8.2)
RBC # BLD AUTO: 4.01 M/UL (ref 4.05–5.2)
SODIUM SERPL-SCNC: 141 MMOL/L (ref 133–143)
TIBC SERPL-MCNC: 270 UG/DL (ref 250–450)
WBC # BLD AUTO: 5 K/UL (ref 4.3–11.1)

## 2023-02-13 PROCEDURE — 82378 CARCINOEMBRYONIC ANTIGEN: CPT

## 2023-02-13 PROCEDURE — 80053 COMPREHEN METABOLIC PANEL: CPT

## 2023-02-13 PROCEDURE — 82728 ASSAY OF FERRITIN: CPT

## 2023-02-13 PROCEDURE — 83540 ASSAY OF IRON: CPT

## 2023-02-13 PROCEDURE — 85025 COMPLETE CBC W/AUTO DIFF WBC: CPT

## 2023-02-13 PROCEDURE — 99214 OFFICE O/P EST MOD 30 MIN: CPT | Performed by: INTERNAL MEDICINE

## 2023-02-13 PROCEDURE — 83735 ASSAY OF MAGNESIUM: CPT

## 2023-02-13 PROCEDURE — 36415 COLL VENOUS BLD VENIPUNCTURE: CPT

## 2023-02-13 ASSESSMENT — PATIENT HEALTH QUESTIONNAIRE - PHQ9
SUM OF ALL RESPONSES TO PHQ9 QUESTIONS 1 & 2: 0
SUM OF ALL RESPONSES TO PHQ QUESTIONS 1-9: 0
2. FEELING DOWN, DEPRESSED OR HOPELESS: 0
SUM OF ALL RESPONSES TO PHQ QUESTIONS 1-9: 0
1. LITTLE INTEREST OR PLEASURE IN DOING THINGS: 0

## 2023-02-13 NOTE — PATIENT INSTRUCTIONS
Patient Instructions from Today's Visit    Reason for Visit:  Post surgery follow up    Diagnosis Information:  https://www.[employer]/. net/about-us/asco-answers-patient-education-materials/zwwy-srvcvls-qyvz-sheets     Plan:  -Continue to heal after surgery   -We will plan to see you in one month to discuss further therapy    Follow Up:  As scheduled    Recent Lab Results:  No visits with results within 3 Day(s) from this visit. Latest known visit with results is:   Admission on 02/06/2023, Discharged on 02/09/2023   Component Date Value Ref Range Status    Crossmatch expiration date 02/06/2023 02/09/2023,2359   Final    ABO/Rh 02/06/2023 A POSITIVE   Final    Antibody Screen 02/06/2023 NEG   Final    Preg Test, Ur 02/06/2023 Negative  NEG   Final    POC Potassium 02/06/2023 3.3 (A)  3.5 - 5.1 MMOL/L Final    Sodium 02/07/2023 140  133 - 143 mmol/L Final    Potassium 02/07/2023 3.3 (A)  3.5 - 5.1 mmol/L Final    Chloride 02/07/2023 107  101 - 110 mmol/L Final    CO2 02/07/2023 27  21 - 32 mmol/L Final    Anion Gap 02/07/2023 6  2 - 11 mmol/L Final    Glucose 02/07/2023 104 (A)  65 - 100 mg/dL Final    BUN 02/07/2023 13  6 - 23 MG/DL Final    Creatinine 02/07/2023 0.50 (A)  0.6 - 1.0 MG/DL Final    Est, Glom Filt Rate 02/07/2023 >60  >60 ml/min/1.73m2 Final    Comment:      Pediatric calculator link: [employer].at. org/professionals/kdoqi/gfr_calculatorped       These results are not intended for use in patients <25years of age. eGFR results are calculated without a race factor using  the 2021 CKD-EPI equation. Careful clinical correlation is recommended, particularly when comparing to results calculated using previous equations. The CKD-EPI equation is less accurate in patients with extremes of muscle mass, extra-renal metabolism of creatinine, excessive creatine ingestion, or following therapy that affects renal tubular secretion.       Calcium 02/07/2023 9.0  8.3 - 10.4 MG/DL Final    Magnesium 02/07/2023 2.0  1.8 - 2.4 mg/dL Final    Phosphorus 02/07/2023 4.3  2.5 - 4.5 MG/DL Final    WBC 02/07/2023 7.1  4.3 - 11.1 K/uL Final    RBC 02/07/2023 3.57 (A)  4.05 - 5.2 M/uL Final    Hemoglobin 02/07/2023 11.6 (A)  11.7 - 15.4 g/dL Final    Hematocrit 02/07/2023 34.1 (A)  35.8 - 46.3 % Final    MCV 02/07/2023 95.5  82 - 102 FL Final    MCH 02/07/2023 32.5  26.1 - 32.9 PG Final    MCHC 02/07/2023 34.0  31.4 - 35.0 g/dL Final    RDW 02/07/2023 12.0  11.9 - 14.6 % Final    Platelets 31/75/0951 115 (A)  150 - 450 K/uL Final    MPV 02/07/2023 11.0  9.4 - 12.3 FL Final    nRBC 02/07/2023 0.00  0.0 - 0.2 K/uL Final    **Note: Absolute NRBC parameter is now reported with Hemogram**    Differential Type 02/07/2023 AUTOMATED    Final    Seg Neutrophils 02/07/2023 82 (A)  43 - 78 % Final    Lymphocytes 02/07/2023 9 (A)  13 - 44 % Final    Monocytes 02/07/2023 9  4.0 - 12.0 % Final    Eosinophils % 02/07/2023 0 (A)  0.5 - 7.8 % Final    Basophils 02/07/2023 0  0.0 - 2.0 % Final    Immature Granulocytes 02/07/2023 0  0.0 - 5.0 % Final    Segs Absolute 02/07/2023 5.8  1.7 - 8.2 K/UL Final    Absolute Lymph # 02/07/2023 0.7  0.5 - 4.6 K/UL Final    Absolute Mono # 02/07/2023 0.6  0.1 - 1.3 K/UL Final    Absolute Eos # 02/07/2023 0.0  0.0 - 0.8 K/UL Final    Basophils Absolute 02/07/2023 0.0  0.0 - 0.2 K/UL Final    Absolute Immature Granulocyte 02/07/2023 0.0  0.0 - 0.5 K/UL Final    Sodium 02/08/2023 143  133 - 143 mmol/L Final    Potassium 02/08/2023 3.2 (A)  3.5 - 5.1 mmol/L Final    Chloride 02/08/2023 108  101 - 110 mmol/L Final    CO2 02/08/2023 26  21 - 32 mmol/L Final    Anion Gap 02/08/2023 9  2 - 11 mmol/L Final    Glucose 02/08/2023 104 (A)  65 - 100 mg/dL Final    BUN 02/08/2023 14  6 - 23 MG/DL Final    Creatinine 02/08/2023 0.60  0.6 - 1.0 MG/DL Final    Est, Glom Filt Rate 02/08/2023 >60  >60 ml/min/1.73m2 Final    Comment:      Pediatric calculator link: Tres.at. org/professionals/kdoqi/gfr_calculatorped       These results are not intended for use in patients <25years of age. eGFR results are calculated without a race factor using  the 2021 CKD-EPI equation. Careful clinical correlation is recommended, particularly when comparing to results calculated using previous equations. The CKD-EPI equation is less accurate in patients with extremes of muscle mass, extra-renal metabolism of creatinine, excessive creatine ingestion, or following therapy that affects renal tubular secretion.       Calcium 02/08/2023 8.3  8.3 - 10.4 MG/DL Final    Magnesium 02/08/2023 2.2  1.8 - 2.4 mg/dL Final    Phosphorus 02/08/2023 2.6  2.5 - 4.5 MG/DL Final    WBC 02/08/2023 5.3  4.3 - 11.1 K/uL Final    RBC 02/08/2023 3.29 (A)  4.05 - 5.2 M/uL Final    Hemoglobin 02/08/2023 10.6 (A)  11.7 - 15.4 g/dL Final    Hematocrit 02/08/2023 31.6 (A)  35.8 - 46.3 % Final    MCV 02/08/2023 96.0  82 - 102 FL Final    MCH 02/08/2023 32.2  26.1 - 32.9 PG Final    MCHC 02/08/2023 33.5  31.4 - 35.0 g/dL Final    RDW 02/08/2023 12.0  11.9 - 14.6 % Final    Platelets 74/59/3368 104 (A)  150 - 450 K/uL Final    MPV 02/08/2023 10.7  9.4 - 12.3 FL Final    nRBC 02/08/2023 0.00  0.0 - 0.2 K/uL Final    **Note: Absolute NRBC parameter is now reported with Hemogram**    Differential Type 02/08/2023 AUTOMATED    Final    Seg Neutrophils 02/08/2023 83 (A)  43 - 78 % Final    Lymphocytes 02/08/2023 9 (A)  13 - 44 % Final    Monocytes 02/08/2023 7  4.0 - 12.0 % Final    Eosinophils % 02/08/2023 1  0.5 - 7.8 % Final    Basophils 02/08/2023 0  0.0 - 2.0 % Final    Immature Granulocytes 02/08/2023 0  0.0 - 5.0 % Final    Segs Absolute 02/08/2023 4.4  1.7 - 8.2 K/UL Final    Absolute Lymph # 02/08/2023 0.5  0.5 - 4.6 K/UL Final    Absolute Mono # 02/08/2023 0.4  0.1 - 1.3 K/UL Final    Absolute Eos # 02/08/2023 0.1  0.0 - 0.8 K/UL Final    Basophils Absolute 02/08/2023 0.0  0.0 - 0.2 K/UL Final Absolute Immature Granulocyte 02/08/2023 0.0  0.0 - 0.5 K/UL Final    Sodium 02/09/2023 143  133 - 143 mmol/L Final    Potassium 02/09/2023 3.8  3.5 - 5.1 mmol/L Final    Chloride 02/09/2023 106  101 - 110 mmol/L Final    CO2 02/09/2023 29  21 - 32 mmol/L Final    Anion Gap 02/09/2023 8  2 - 11 mmol/L Final    Glucose 02/09/2023 127 (A)  65 - 100 mg/dL Final    BUN 02/09/2023 12  6 - 23 MG/DL Final    Creatinine 02/09/2023 0.50 (A)  0.6 - 1.0 MG/DL Final    Est, Glom Filt Rate 02/09/2023 >60  >60 ml/min/1.73m2 Final    Comment:      Pediatric calculator link: Tres.at. org/professionals/kdoqi/gfr_calculatorped       These results are not intended for use in patients <25years of age. eGFR results are calculated without a race factor using  the 2021 CKD-EPI equation. Careful clinical correlation is recommended, particularly when comparing to results calculated using previous equations. The CKD-EPI equation is less accurate in patients with extremes of muscle mass, extra-renal metabolism of creatinine, excessive creatine ingestion, or following therapy that affects renal tubular secretion.       Calcium 02/09/2023 8.7  8.3 - 10.4 MG/DL Final    Magnesium 02/09/2023 2.4  1.8 - 2.4 mg/dL Final    Phosphorus 02/09/2023 3.5  2.5 - 4.5 MG/DL Final    WBC 02/09/2023 4.4  4.3 - 11.1 K/uL Final    RBC 02/09/2023 3.44 (A)  4.05 - 5.2 M/uL Final    Hemoglobin 02/09/2023 11.2 (A)  11.7 - 15.4 g/dL Final    Hematocrit 02/09/2023 33.0 (A)  35.8 - 46.3 % Final    MCV 02/09/2023 95.9  82 - 102 FL Final    MCH 02/09/2023 32.6  26.1 - 32.9 PG Final    MCHC 02/09/2023 33.9  31.4 - 35.0 g/dL Final    RDW 02/09/2023 11.9  11.9 - 14.6 % Final    Platelets 95/21/5914 116 (A)  150 - 450 K/uL Final    MPV 02/09/2023 11.0  9.4 - 12.3 FL Final    nRBC 02/09/2023 0.00  0.0 - 0.2 K/uL Final    **Note: Absolute NRBC parameter is now reported with Hemogram**    Differential Type 02/09/2023 AUTOMATED    Final    Seg Neutrophils 02/09/2023 74  43 - 78 % Final    Lymphocytes 02/09/2023 13  13 - 44 % Final    Monocytes 02/09/2023 8  4.0 - 12.0 % Final    Eosinophils % 02/09/2023 5  0.5 - 7.8 % Final    Basophils 02/09/2023 0  0.0 - 2.0 % Final    Immature Granulocytes 02/09/2023 0  0.0 - 5.0 % Final    Segs Absolute 02/09/2023 3.2  1.7 - 8.2 K/UL Final    Absolute Lymph # 02/09/2023 0.6  0.5 - 4.6 K/UL Final    Absolute Mono # 02/09/2023 0.4  0.1 - 1.3 K/UL Final    Absolute Eos # 02/09/2023 0.2  0.0 - 0.8 K/UL Final    Basophils Absolute 02/09/2023 0.0  0.0 - 0.2 K/UL Final    Absolute Immature Granulocyte 02/09/2023 0.0  0.0 - 0.5 K/UL Final        Treatment Summary has been discussed and given to patient: n/a        -------------------------------------------------------------------------------------------------------------------  Please call our office at [phone] if you have any  of the following symptoms:   Fever of 100.5 or greater  Chills  Shortness of breath  Swelling or pain in one leg    After office hours an answering service is available and will contact a provider for emergencies or if you are experiencing any of the above symptoms. Patient does express an interest in My Chart. My Chart log in information explained on the after visit summary printout at the . [address] desk.     Parris Cuevas, RN  Nurse Navigator  [address] [facility] [address] [phone]

## 2023-02-13 NOTE — PROGRESS NOTES
[facility] Hematology & Oncology: Office Visit Progress Note    Chief Complaint:    Rectal cancer    History of Present Illness:  Reason for Referral: Rectal cancer     Referring Provider:  Prudence Rashid MD     Primary Care Provider: Herman Mcmullen DO     Family History of Cancer/Hematologic Disorders: None noted     Presenting Symptoms: LLQ abdominal pain     Ms. Kerry uQeen is a 49 y.o.  female who reports to have never used tobacco products. She denies use of alcohol or drug substances. Her medical history reports as rectal bleeding. Her surgical history reports as  and colonoscopy. On 7/15/22, Ms. Kerry Queen saw Dr Vivi An for her newly diagnosed rectal cancer. She reported to have had rectal bleeding for 2 months which prompted her first colonoscopy. A 5 cm half circumferential friable mass was identified at the most proximal valve of Singh, biopsy revealed at least carcinoma in situ. 2022 CT scan of c/a/p demonstrated no distal metastases. She reported intermittent left lower quadrant abdominal pain for years. She has EUS scheduled for August however Dr Vivi An reached out to Dr Olivia Vaughan to move up her planned EUS. A referral was placed to hematology for consideration of neoadjuvant chemoradiation. Currently there is no referral to radiation oncology. Her 2022 CBC and CMP reported WNL except for a low creatinine of 0.47.     22 Colonoscopy         2022 Surgical Pathology  DIAGNOSIS  A:  \"RECTAL MASS BIOPSIES\":        FRAGMENTS OF AT LEAST IN SITU ADENOCARCINOMA. SEE COMMENT. COMMENT: BECAUSE BIOPSY FRAGMENTS ARE LIMITED PRIMARIALY TO MUCOSA,   SUBMUCOSAL INFILTRATION CANNOT BE RULED OUT   B:  \"RANDOM COLON BIOPSIES\":        FRAGMENTS OF HISTOLOGICALLY UNREMARKABLE LARGE INTESTINE   C:  \"ASCENDING COLON POLYP\":        FRAGMENT OF MIXED TUBULOVILLOUS ADENOMA     22 CT Chest Abdomen and Pelvis  FINDINGS:  Lungs: No pulmonary nodules. No airspace disease.   Airways: Trachea and proximal bronchi grossly patent. Pleura: No effusion or thickening or calcifications. Lymph Nodes: No enlarged axillary, hilar or mediastinal lymph nodes. Heart: Normal size. Coronaries: No definite calcifications. Aorta: Normal caliber. Esophagus: Thickening of the distal esophagus  Skeletal/Chest Wall: No gross bony lesions. Liver: A 4 mm cyst anterior segment right lobe, axial image 47, otherwise  unremarkable. Gallbladder: No gallstones identified  Bile Ducts: Not dilated. Pancreas: Unremarkable. Spleen: Uniform and normal size. Stomach: Unremarkable. Bowel: There is mural thickening of the rectum, probably a cyst 6 cm in length. Margins are indistinct. Small nodular densities probably represent mesorectal  node. Adrenals: Are normal size. Kidneys/Ureters: Enhance symmetrically. No hydronephrosis. Lymph Nodes: No grossly enlarged retroperitoneal, mesenteric, or pelvic  adenopathy. Vasculature: Aorta is normal caliber. Bones: No gross bony lesions. Other: No ascites. Impression  Mural thickening of the rectum suspicious for neoplasm. Margins are  indistinct. Probable small mesorectal lymph nodes. There is one small cyst in  liver. No pulmonary nodules. PET [id number]:  1. Hypermetabolic rectal mass consistent with known rectal malignancy. No   evidence of metastatic disease in the neck, chest, abdomen or pelvis. Small   perirectal lymph nodes are present but may be too small to accurately   characterize by PET imaging. No FDG activity noted in these nodes. Review of Systems:  Constitutional Fatigue. Denies fever or chills. Denies weight loss or appetite changes. Denies anorexia. HEENT Denies trauma, bluring vision, hearing loss, ear pain, nosebleeds, sore throat, neck pain and ear discharge. Skin Denies lesions or rashes. Lungs Denies shortness of breath, cough, sputum production or hemoptysis. Cardiovascular Denies chest pain, palpitations, orthopnea, claudication and leg swelling. Gastrointestinal LLQ pain. Blood in stool resolved. Nausea controlled. Denies vomiting.  Denies dysuria, frequency or hesitancy of urination   Neuro Peripheral neuropathy. Denies headaches, visual changes or ataxia. Denies dizziness, tingling, tremors,  speech change, focal weakness and headaches. Hematology Denies nasal/gum bleeding, denies easy bruise   Endo Denies heat/cold intolerance, denies diabetes. MSK Denies back pain, swollen legs, myalgias and falls. Psychiatric/Behavioral Denies depression and substance abuse. The patient is not nervous/anxious.        Allergies   Allergen Reactions    Amoxicillin Rash     Past Medical History:   Diagnosis Date    Cancer ([facility] [address]     rectal cancer-- dx 2022---- followed by dr Nhan Barker-- per spouse after scans/test  then will determine what the plan is    Rectal bleeding     onset 2022     Past Surgical History:   Procedure Laterality Date     SECTION      COLECTOMY N/A 2023    ERA'S LAPAROSCOPIC LOW ANTERIOR COLON RESECTION WITH TOTAL MESORECTAL EXCISION performed by Dandre Urban MD at [facility] MAIN OR    COLONOSCOPY      COLONOSCOPY N/A 2022    COLONOSCOPY ULTRASOUND/ BMI 19 performed by Shonda London MD at [facility] ENDOSCOPY    IR PORT PLACEMENT EQUAL OR GREATER THAN 5 YEARS  2022    IR PORT PLACEMENT EQUAL OR GREATER THAN 5 YEARS 2022 [facility] SPECIALS    SIGMOIDOSCOPY N/A 2023    SIGMOIDOSCOPY DIAGNOSTIC FLEXIBLE performed by Dandre Urban MD at [facility] ENDOSCOPY     Family History   Problem Relation Age of Onset    High Blood Pressure Mother      Social History     Socioeconomic History    Marital status:      Spouse name: Not on file    Number of children: Not on file    Years of education: Not on file    Highest education level: Not on file   Occupational History    Not on file   Tobacco Use    Smoking status: Never    Smokeless tobacco: Never   Vaping Use    Vaping Use: Never used   Substance and Sexual Activity    Alcohol use: Yes     Comment: rare    Drug use: Never    Sexual activity: Not on file   Other Topics Concern    Not on file   Social History Narrative    Not on file     Social Determinants of Health     Financial Resource Strain: Not on file   Food Insecurity: Not on file   Transportation Needs: Not on file   Physical Activity: Not on file   Stress: Not on file   Social Connections: Not on file   Intimate Partner Violence: Not on file   Housing Stability: Not on file     Current Outpatient Medications   Medication Sig Dispense Refill    gabapentin (NEURONTIN) 300 MG capsule Take 1 capsule by mouth 3 times daily for 15 days. 45 capsule 0    docusate sodium (COLACE, DULCOLAX) 100 MG CAPS Take 100 mg by mouth 2 times daily as needed for Constipation      potassium chloride (KLOR-CON M) 20 MEQ extended release tablet Take 2 tablets by mouth daily 6 tablet 0     No current facility-administered medications for this visit. Facility-Administered Medications Ordered in Other Visits   Medication Dose Route Frequency Provider Last Rate Last Admin    sodium chloride flush 0.9 % injection 5-40 mL  5-40 mL IntraVENous PRN Nick Reed MD   10 mL at 09/24/22 1644       OBJECTIVE:  /74   Pulse 90   Temp 97.3 °F (36.3 °C) (Oral)   Resp 18   Ht 5' 1\" (1.549 m)   Wt 115 lb 8 oz (52.4 kg)   SpO2 97%   BMI 21.82 kg/m²     Physical Exam:  Constitutional: Oriented to person, place, and time. Well-developed and well-nourished. HEENT: Normocephalic and atraumatic. Oropharynx is clear and moist.   Conjunctivae and EOM are normal. Pupils are equal, round, and reactive to light. No scleral icterus. Neck supple. No JVD present. No tracheal deviation present. No thyromegaly present. Lymph node No palpable submandibular, cervical, supraclavicular, axillary and inguinal lymph nodes. Skin Warm and dry. No bruising and no rash noted. No erythema. No pallor.     Respiratory Effort normal and breath sounds normal.  No respiratory distress. No wheezes. No rales. No tenderness. CVS Normal rate, regular rhythm and normal heart sounds. Exam reveals no gallop, no friction and no rub. No murmur heard. Abdomen Soft. Bowel sounds are normal. Exhibits no distension. There is no tenderness. There is no rebound and no guarding. Neuro Normal reflexes. No cranial nerve deficit. Exhibits normal muscle tone, 5 of 5 strength of all extremities. MSK Normal range of motion in general.  No edema and no tenderness.    Psych Normal mood, affect, behavior, judgment and thought content      Labs:  Recent Results (from the past 24 hour(s))   CBC with Auto Differential    Collection Time: 02/13/23  9:20 AM   Result Value Ref Range    WBC 5.0 4.3 - 11.1 K/uL    RBC 4.01 (L) 4.05 - 5.2 M/uL    Hemoglobin 12.8 11.7 - 15.4 g/dL    Hematocrit 38.2 35.8 - 46.3 %    MCV 95.3 82.0 - 102.0 FL    MCH 31.9 26.1 - 32.9 PG    MCHC 33.5 31.4 - 35.0 g/dL    RDW 12.2 11.9 - 14.6 %    Platelets 706 752 - 641 K/uL    MPV 10.0 9.4 - 12.3 FL    nRBC 0.00 0.0 - 0.2 K/uL    Seg Neutrophils 74 43 - 78 %    Lymphocytes 12 (L) 13 - 44 %    Monocytes 8 4.0 - 12.0 %    Eosinophils % 5 0.5 - 7.8 %    Basophils 0 0.0 - 2.0 %    Immature Granulocytes 1 0.0 - 5.0 %    Segs Absolute 3.7 1.7 - 8.2 K/UL    Absolute Lymph # 0.6 0.5 - 4.6 K/UL    Absolute Mono # 0.4 0.1 - 1.3 K/UL    Absolute Eos # 0.2 0.0 - 0.8 K/UL    Basophils Absolute 0.0 0.0 - 0.2 K/UL    Absolute Immature Granulocyte 0.1 0.0 - 0.5 K/UL    Differential Type AUTOMATED     Comprehensive Metabolic Panel    Collection Time: 02/13/23  9:20 AM   Result Value Ref Range    Sodium 141 133 - 143 mmol/L    Potassium 3.8 3.5 - 5.1 mmol/L    Chloride 105 101 - 110 mmol/L    CO2 29 21 - 32 mmol/L    Anion Gap 7 2 - 11 mmol/L    Glucose 116 (H) 65 - 100 mg/dL    BUN 14 6 - 23 MG/DL    Creatinine 0.60 0.6 - 1.0 MG/DL    Est, Glom Filt Rate >60 >60 ml/min/1.73m2    Calcium 9.4 8.3 - 10.4 MG/DL    Total Bilirubin 0.6 0.2 - 1.1 MG/DL    ALT 30 12 - 65 U/L    AST 10 (L) 15 - 37 U/L    Alk Phosphatase 56 50 - 136 U/L    Total Protein 7.0 6.3 - 8.2 g/dL    Albumin 3.6 3.5 - 5.0 g/dL    Globulin 3.4 2.8 - 4.5 g/dL    Albumin/Globulin Ratio 1.1 0.4 - 1.6     Magnesium    Collection Time: 02/13/23  9:20 AM   Result Value Ref Range    Magnesium 2.2 1.8 - 2.4 mg/dL   CEA    Collection Time: 02/13/23  9:20 AM   Result Value Ref Range    CEA 0.9 0.0 - 3.0 ng/mL   Transferrin Saturation    Collection Time: 02/13/23  9:20 AM   Result Value Ref Range    Iron 74 35 - 150 ug/dL    TIBC 270 250 - 450 ug/dL    TRANSFERRIN SATURATION 27 >20 %   Ferritin    Collection Time: 02/13/23  9:20 AM   Result Value Ref Range    Ferritin 415 (H) 8 - 388 NG/ML       Imaging:  No results found for this or any previous visit. ASSESSMENT/PLAN:   Diagnosis Orders   1. Rectal cancer [facility])                          49 y.o. F urgently consulted for rectal carcinoma.   She had good general baseline health, developed frequency of bowel movement and blood in stool for 2 months, colonoscopy showed low rectal adenocarcinoma and urgently consulted to oncology, discussed carcinogenesis and staging, urgent arrangement of EUS reported T3N0 disease but quite large avid disease on PET with small lymph nodes, discussed at tumor board and concern of level of local invasion, arrange rectal MRI, discussed neoadjuvant treatment options with patient and wife and desired to treat aggressively for maximal chance of CR, arrange JENNIFER in the manner of Prodige 23 (Neoadjuvant rectal cancer treatment with JENNIFER using three months of modified FOLFIRINOX (oxaliplatin 85 mg/m2, leucovorin 400 mg/m2, irinotecan 180 mg/m2 day 1, and FU 2400 mg/m2 over 46 hours every 14 days) followed by long-course CRT (50 Gy in 25 fractions plus concurrent capecitabine), TME, and three additional months of FOLFOX or capecitabine), we discussed toxicities and management, arrange port placement, antiemetics, Emla, IV iron, rectal MRI confirmed chief receive M0 disease, proceed to cycle 1 neoadjuvant FOLFIRINOX 8/11/2022, generally tolerated well, 4 bowel movements a day but not watery, there was left lower quadrant pain, nausea, discussed management accordingly, labs reviewed and CEA down, signs of clinical response but complaining of increased fatigue, increased neuropathy, thrombocytopenic, discussed the risk of chronic neuropathy and patient desired to continue and reduce oxaliplatin to 60 mg/m2 from cycle 5, return on 10/19/2022 and reporting numbness in feet persistent, received cycle 6 without oxaliplatin, plan to start chemoradiation next month and coordinate with radiation oncology, discussed she has generally tolerated well with wonderful clinical response and CEA normalized, we discussed that adjuvant chemotherapy is part of the plan per protocol but will address her desire and concern after surgery, return on 11/14/2022 labs reviewed, proceed to Xeloda radiation, feet numbness mostly recovered and continue to monitor, again discussed left lower quadrant pain developed seemed chronically fluctuating before cancer was diagnosed, which may be related with pelvic inflammation or pelvic endometriosis and I doubt related to cancer symptom, she will discuss with Dr. Ulises Jain to explore in surgery, return on 12/12/2022, platelet 89 and ANC down to 1.1, worsening pain at defecation, Proctofoam not enough for pain control, add tramadol, proceed to week 5 Xeloda radiation, reduce Xeloda to 1000 mg twice daily for neutropenia and ANC 1.8, proceed to last week chemoradiation, refill Proctofoam and Norco, hydration for hypotension, see Dr. Ulises Jain to arrange surgery done on 2/6/2023, remaining moderately differentiated adenocarcinoma, 2.4 cm YPT3N0, postoperative recovery as expected, discussed the result and indication to complete adjuvant chemotherapy, she felt apprehended and reports still having numbness in the feet, discussed proper options with capecitabine for 3 months, for now she will continue postop recovery and follow Dr. Sameera Akhtar, return in a month with decision whether to pursue adjuvant capecitabine or not. Review/visit/discussion/documentation over 30 minutes. All questions are answered to their satisfaction. They will call for further questions and concerns. ECOG PERFORMANCE STATUS - 0-Fully active, able to carry on all pre-disease performance without restriction. Pain - 0 - No pain/10. None/Minimal pain - not affecting QOL     Fatigue - No flowsheet data found. Distress - No flowsheet data found. Elements of this note have been dictated via voice recognition software. Text and phrases may be limited by the accuracy and autoconversion of the software. The chart has been reviewed, but errors may still be present. Antoni Melendez M.D.   [address]  Office : [phone]  Fax : [phone]

## 2023-02-17 ENCOUNTER — OFFICE VISIT (OUTPATIENT)
Dept: SURGERY | Age: 48
End: 2023-02-17

## 2023-02-17 DIAGNOSIS — Z48.89 AFTERCARE FOLLOWING SURGERY: Primary | ICD-10-CM

## 2023-02-17 PROCEDURE — 99024 POSTOP FOLLOW-UP VISIT: CPT | Performed by: SURGERY

## 2023-02-17 NOTE — PROGRESS NOTES
[facility]  [facility]. [address] [place] [address], [address] [place] [address]  Baltazar Quezada, [address] [facility]  [phone]      SUBJECTIVE: Jackson Tenorio is a 49 y.o. female is seen for a routine postop check. She had laparoscopic proctectomy with total mesorectal excision for a rectal cancer after total neoadjuvant chemotherapy and radiation. Surgery went well and she had uneventful recovery. Today, she reports no problems with the wound. Activity, diet are normal. Two to three soft BM a day. Minimal incisional pain. OBJECTIVE: Appears well. Wound is well healed without complications or infection. PATH:\" RECTOSIGMOID\":        MODERATELY DIFFERENTIATED ADENOCARCINOMA, 2.4 CM IN GREATEST   DIMENSION. CARCINOMA INVADES THROUGH MUSCULARIS PROPRIA INTO PERICOLORECTAL ADIPOSE. MARGINS ARE NEGATIVE FOR CARCINOMA. TWELVE LYMPH NODES NEGATIVE FOR CARCINOMA (0/12). ASSESSMENT: normal postoperative course, doing well. Her rectal cancer is completely removed, final stage ypT3N0. PLAN: Staples removed. Advance to soft diet. Recheck in 4 weeks. Follow with oncology.     Bert Currie MD

## 2023-03-13 ENCOUNTER — HOSPITAL ENCOUNTER (OUTPATIENT)
Dept: LAB | Age: 48
Discharge: HOME | End: 2023-03-16
Payer: COMMERCIAL

## 2023-03-13 ENCOUNTER — OFFICE VISIT (OUTPATIENT)
Dept: ONCOLOGY | Age: 48
End: 2023-03-13
Payer: COMMERCIAL

## 2023-03-13 VITALS
OXYGEN SATURATION: 97 % | SYSTOLIC BLOOD PRESSURE: 101 MMHG | HEART RATE: 86 BPM | HEIGHT: 61 IN | RESPIRATION RATE: 14 BRPM | DIASTOLIC BLOOD PRESSURE: 69 MMHG | BODY MASS INDEX: 21.94 KG/M2 | TEMPERATURE: 98 F | WEIGHT: 116.2 LBS

## 2023-03-13 DIAGNOSIS — C20 RECTAL CANCER (HCC): Primary | ICD-10-CM

## 2023-03-13 DIAGNOSIS — T45.1X5A CINV (CHEMOTHERAPY-INDUCED NAUSEA AND VOMITING): ICD-10-CM

## 2023-03-13 DIAGNOSIS — C20 RECTAL CANCER (HCC): ICD-10-CM

## 2023-03-13 DIAGNOSIS — Z79.899 HIGH RISK MEDICATION USE: ICD-10-CM

## 2023-03-13 DIAGNOSIS — R11.2 CINV (CHEMOTHERAPY-INDUCED NAUSEA AND VOMITING): ICD-10-CM

## 2023-03-13 LAB
ALBUMIN SERPL-MCNC: 3.7 G/DL (ref 3.5–5)
ALBUMIN/GLOB SERPL: 1.2 (ref 0.4–1.6)
ALP SERPL-CCNC: 50 U/L (ref 50–136)
ALT SERPL-CCNC: 14 U/L (ref 12–65)
ANION GAP SERPL CALC-SCNC: 4 MMOL/L (ref 2–11)
AST SERPL-CCNC: 9 U/L (ref 15–37)
BASOPHILS # BLD: 0 K/UL (ref 0–0.2)
BASOPHILS NFR BLD: 1 % (ref 0–2)
BILIRUB SERPL-MCNC: 0.5 MG/DL (ref 0.2–1.1)
BUN SERPL-MCNC: 18 MG/DL (ref 6–23)
CALCIUM SERPL-MCNC: 9.1 MG/DL (ref 8.3–10.4)
CEA SERPL-MCNC: 0.8 NG/ML (ref 0–3)
CHLORIDE SERPL-SCNC: 108 MMOL/L (ref 101–110)
CO2 SERPL-SCNC: 28 MMOL/L (ref 21–32)
CREAT SERPL-MCNC: 0.6 MG/DL (ref 0.6–1)
DIFFERENTIAL METHOD BLD: ABNORMAL
EOSINOPHIL # BLD: 0.2 K/UL (ref 0–0.8)
EOSINOPHIL NFR BLD: 4 % (ref 0.5–7.8)
ERYTHROCYTE [DISTWIDTH] IN BLOOD BY AUTOMATED COUNT: 11.9 % (ref 11.9–14.6)
GLOBULIN SER CALC-MCNC: 3 G/DL (ref 2.8–4.5)
GLUCOSE SERPL-MCNC: 114 MG/DL (ref 65–100)
HCT VFR BLD AUTO: 40.5 % (ref 35.8–46.3)
HGB BLD-MCNC: 13 G/DL (ref 11.7–15.4)
IMM GRANULOCYTES # BLD AUTO: 0 K/UL (ref 0–0.5)
IMM GRANULOCYTES NFR BLD AUTO: 0 % (ref 0–5)
LYMPHOCYTES # BLD: 0.8 K/UL (ref 0.5–4.6)
LYMPHOCYTES NFR BLD: 19 % (ref 13–44)
MAGNESIUM SERPL-MCNC: 2.2 MG/DL (ref 1.8–2.4)
MCH RBC QN AUTO: 30.8 PG (ref 26.1–32.9)
MCHC RBC AUTO-ENTMCNC: 32.1 G/DL (ref 31.4–35)
MCV RBC AUTO: 96 FL (ref 82–102)
MONOCYTES # BLD: 0.4 K/UL (ref 0.1–1.3)
MONOCYTES NFR BLD: 10 % (ref 4–12)
NEUTS SEG # BLD: 2.6 K/UL (ref 1.7–8.2)
NEUTS SEG NFR BLD: 66 % (ref 43–78)
NRBC # BLD: 0 K/UL (ref 0–0.2)
PLATELET # BLD AUTO: 138 K/UL (ref 150–450)
PMV BLD AUTO: 11.1 FL (ref 9.4–12.3)
POTASSIUM SERPL-SCNC: 3.7 MMOL/L (ref 3.5–5.1)
PROT SERPL-MCNC: 6.7 G/DL (ref 6.3–8.2)
RBC # BLD AUTO: 4.22 M/UL (ref 4.05–5.2)
SODIUM SERPL-SCNC: 140 MMOL/L (ref 133–143)
WBC # BLD AUTO: 3.9 K/UL (ref 4.3–11.1)

## 2023-03-13 PROCEDURE — 99215 OFFICE O/P EST HI 40 MIN: CPT | Performed by: INTERNAL MEDICINE

## 2023-03-13 PROCEDURE — 85025 COMPLETE CBC W/AUTO DIFF WBC: CPT

## 2023-03-13 PROCEDURE — 82378 CARCINOEMBRYONIC ANTIGEN: CPT

## 2023-03-13 PROCEDURE — 80053 COMPREHEN METABOLIC PANEL: CPT

## 2023-03-13 PROCEDURE — 36415 COLL VENOUS BLD VENIPUNCTURE: CPT

## 2023-03-13 PROCEDURE — 83735 ASSAY OF MAGNESIUM: CPT

## 2023-03-13 RX ORDER — CAPECITABINE 500 MG/1
1000 TABLET, FILM COATED ORAL 2 TIMES DAILY
Qty: 88 TABLET | Refills: 2 | Status: SHIPPED | OUTPATIENT
Start: 2023-03-13

## 2023-03-13 ASSESSMENT — PATIENT HEALTH QUESTIONNAIRE - PHQ9
SUM OF ALL RESPONSES TO PHQ QUESTIONS 1-9: 0
2. FEELING DOWN, DEPRESSED OR HOPELESS: 0

## 2023-03-13 NOTE — PATIENT INSTRUCTIONS
Patient Instructions from Today's Visit    Reason for Visit:  Prechemo visit    Diagnosis Information:  [email or web address]-[email or web address]-[email or web address]-[email or web address]      Plan:  -Plan is to restart chemotherapy pills (Xeloda) 1000mg twice a day Monday -Friday for 3 months    Follow Up:  As scheduled    Recent Lab Results:  Hospital Outpatient Visit on 03/13/2023   Component Date Value Ref Range Status    WBC 03/13/2023 3.9 (A)  4.3 - 11.1 K/uL Final    RBC 03/13/2023 4.22  4.05 - 5.2 M/uL Final    Hemoglobin 03/13/2023 13.0  11.7 - 15.4 g/dL Final    Hematocrit 03/13/2023 40.5  35.8 - 46.3 % Final    MCV 03/13/2023 96.0  82.0 - 102.0 FL Final    MCH 03/13/2023 30.8  26.1 - 32.9 PG Final    MCHC 03/13/2023 32.1  31.4 - 35.0 g/dL Final    RDW 03/13/2023 11.9  11.9 - 14.6 % Final    Platelets 45/42/9535 138 (A)  150 - 450 K/uL Final    MPV 03/13/2023 11.1  9.4 - 12.3 FL Final    nRBC 03/13/2023 0.00  0.0 - 0.2 K/uL Final    **Note: Absolute NRBC parameter is now reported with Hemogram**    Seg Neutrophils 03/13/2023 66  43 - 78 % Final    Lymphocytes 03/13/2023 19  13 - 44 % Final    Monocytes 03/13/2023 10  4.0 - 12.0 % Final    Eosinophils % 03/13/2023 4  0.5 - 7.8 % Final    Basophils 03/13/2023 1  0.0 - 2.0 % Final    Immature Granulocytes 03/13/2023 0  0.0 - 5.0 % Final    Segs Absolute 03/13/2023 2.6  1.7 - 8.2 K/UL Final    Absolute Lymph # 03/13/2023 0.8  0.5 - 4.6 K/UL Final    Absolute Mono # 03/13/2023 0.4  0.1 - 1.3 K/UL Final    Absolute Eos # 03/13/2023 0.2  0.0 - 0.8 K/UL Final    Basophils Absolute 03/13/2023 0.0  0.0 - 0.2 K/UL Final    Absolute Immature Granulocyte 03/13/2023 0.0  0.0 - 0.5 K/UL Final    Differential Type 03/13/2023 AUTOMATED    Final    Sodium 03/13/2023 140  133 - 143 mmol/L Final    Potassium 03/13/2023 3.7  3.5 - 5.1 mmol/L Final    Chloride 03/13/2023 108  101 - 110 mmol/L Final    CO2 03/13/2023 28  21 - 32 mmol/L Final    Anion Gap 03/13/2023 4  2 - 11 mmol/L Final    Glucose 03/13/2023 114 (A)  65 - 100 mg/dL Final    BUN 03/13/2023 18  6 - 23 MG/DL Final    Creatinine 03/13/2023 0.60  0.6 - 1.0 MG/DL Final    Est, Glokevin Filt Rate 03/13/2023 >60  >60 ml/min/1.73m2 Final    Comment:      Pediatric calculator link: [employer]at. org/professionals/kdoqi/gfr_calculatorped       These results are not intended for use in patients <25years of age. eGFR results are calculated without a race factor using  the 2021 CKD-EPI equation. Careful clinical correlation is recommended, particularly when comparing to results calculated using previous equations. The CKD-EPI equation is less accurate in patients with extremes of muscle mass, extra-renal metabolism of creatinine, excessive creatine ingestion, or following therapy that affects renal tubular secretion. Calcium 03/13/2023 9.1  8.3 - 10.4 MG/DL Final    Total Bilirubin 03/13/2023 0.5  0.2 - 1.1 MG/DL Final    ALT 03/13/2023 14  12 - 65 U/L Final    AST 03/13/2023 9 (A)  15 - 37 U/L Final    Alk Phosphatase 03/13/2023 50  50 - 136 U/L Final    Total Protein 03/13/2023 6.7  6.3 - 8.2 g/dL Final    Albumin 03/13/2023 3.7  3.5 - 5.0 g/dL Final    Globulin 03/13/2023 3.0  2.8 - 4.5 g/dL Final    Albumin/Globulin Ratio 03/13/2023 1.2  0.4 - 1.6   Final    Magnesium 03/13/2023 2.2  1.8 - 2.4 mg/dL Final    CEA 03/13/2023 0.8  0.0 - 3.0 ng/mL Final    Comment: Nonsmoker:  <3.0 ng/mL  Smoker:     <5.0 ng/mL  [employer]. Patient's results of tumor marker testing may not be comparable to labs using different manufacturers/methods.           Treatment Summary has been discussed and given to patient: n/a        -------------------------------------------------------------------------------------------------------------------  Please call our office at [phone] if you have any  of the following symptoms:   Fever of 100.5 or greater  Chills  Shortness of breath  Swelling or pain in one leg    After office hours an answering service is available and will contact a provider for emergencies or if you are experiencing any of the above symptoms. Patient does express an interest in My Chart. My Chart log in information explained on the after visit summary printout at the [facility] [address] desk.     Jessica Solis RN  Nurse Navigator  [address] [facility] [id number] [phone]

## 2023-03-14 ENCOUNTER — CLINICAL DOCUMENTATION (OUTPATIENT)
Dept: CASE MANAGEMENT | Age: 48
End: 2023-03-14

## 2023-03-14 DIAGNOSIS — C20 RECTAL CANCER (HCC): ICD-10-CM

## 2023-03-14 DIAGNOSIS — Z79.899 HIGH RISK MEDICATION USE: Primary | ICD-10-CM

## 2023-03-14 NOTE — PROGRESS NOTES
I saw patient on 3-10-23 with Dr Casi Conklin. Pt and Dr Casi Conklin spoke in [facility] but pt will start Xeloda therapy 1000mg BID. Pt is aware to let me know when she is starting Xeloda and will return 4-17-23. Prescription sent.     Navigation is following

## 2023-03-17 ENCOUNTER — OFFICE VISIT (OUTPATIENT)
Dept: SURGERY | Age: 48
End: 2023-03-17

## 2023-03-17 VITALS — BODY MASS INDEX: 22.28 KG/M2 | WEIGHT: 118 LBS | HEIGHT: 61 IN

## 2023-03-17 DIAGNOSIS — Z48.89 AFTERCARE FOLLOWING SURGERY: Primary | ICD-10-CM

## 2023-03-17 PROCEDURE — 99024 POSTOP FOLLOW-UP VISIT: CPT | Performed by: SURGERY

## 2023-03-18 NOTE — PROGRESS NOTES
Routine recheck,     Doing well, no c/o from surgery. Will do adjuvant chemo for 3 months. She has a subcutaneous mass at right lower back for many years, it appears a sebaceous cyst, about 2 cm in size. She wants it to be removed. Call after chemo to schedule.

## 2023-03-28 ENCOUNTER — TELEPHONE (OUTPATIENT)
Dept: ONCOLOGY | Age: 48
End: 2023-03-28

## 2023-03-28 NOTE — TELEPHONE ENCOUNTER
Received call from [address] [facility] with [employer] @ [phone] PA was approved for Xeloda auth# [id number] valid through  03/28/2024.

## 2023-03-28 NOTE — PROGRESS NOTES
Per [employer] need PA for Xeloda 500MG tablets. Completed PA through  [facility], waiting on determination.

## 2023-03-31 NOTE — PROGRESS NOTES
Per [employer]:    \" Hi,      Awesome! We resubmitted the claim and ran into another bump in the road. Per the patient's plan, in order for us to dispense the brand name Xeloda 500mg a new prescription is needed with either WINSOME 1 or WINSOME 2 code. We have received the patient's consent to process the order for the brand Xeloda as well. We also ran a trial claim which confirms the patient's copay would be $0 with the WINSOME 1 code on the rx however, with WINSOME 2 the patient's copay would be $343.01. Can a new rx be sent over for WINSOME-1 instead of WINSOME-0? Please let me know if you have any questions/ concerns and we will be happy to assist ?? .     Thank you,  Ludy\"    Notified clinical team.    Update:   \"The patient has set up shipment for 03/31 with a co-pay of $0. \"

## 2023-04-04 ENCOUNTER — CLINICAL DOCUMENTATION (OUTPATIENT)
Dept: CASE MANAGEMENT | Age: 48
End: 2023-04-04

## 2023-04-17 ENCOUNTER — OFFICE VISIT (OUTPATIENT)
Dept: ONCOLOGY | Age: 48
End: 2023-04-17
Payer: COMMERCIAL

## 2023-04-17 ENCOUNTER — HOSPITAL ENCOUNTER (OUTPATIENT)
Dept: LAB | Age: 48
Discharge: HOME | End: 2023-04-20
Payer: COMMERCIAL

## 2023-04-17 VITALS
TEMPERATURE: 98.1 F | HEART RATE: 80 BPM | DIASTOLIC BLOOD PRESSURE: 69 MMHG | SYSTOLIC BLOOD PRESSURE: 133 MMHG | OXYGEN SATURATION: 95 % | RESPIRATION RATE: 17 BRPM | WEIGHT: 116 LBS | BODY MASS INDEX: 21.9 KG/M2 | HEIGHT: 61 IN

## 2023-04-17 DIAGNOSIS — D69.59 CHEMOTHERAPY-INDUCED THROMBOCYTOPENIA: ICD-10-CM

## 2023-04-17 DIAGNOSIS — C20 RECTAL CANCER (HCC): Primary | ICD-10-CM

## 2023-04-17 DIAGNOSIS — C20 RECTAL CANCER (HCC): ICD-10-CM

## 2023-04-17 DIAGNOSIS — Z79.899 HIGH RISK MEDICATION USE: ICD-10-CM

## 2023-04-17 DIAGNOSIS — T45.1X5A CHEMOTHERAPY-INDUCED THROMBOCYTOPENIA: ICD-10-CM

## 2023-04-17 LAB
ALBUMIN SERPL-MCNC: 4 G/DL (ref 3.5–5)
ALBUMIN/GLOB SERPL: 1.3 (ref 0.4–1.6)
ALP SERPL-CCNC: 58 U/L (ref 50–136)
ALT SERPL-CCNC: 15 U/L (ref 12–65)
ANION GAP SERPL CALC-SCNC: 6 MMOL/L (ref 2–11)
AST SERPL-CCNC: 13 U/L (ref 15–37)
BASOPHILS # BLD: 0 K/UL (ref 0–0.2)
BASOPHILS NFR BLD: 1 % (ref 0–2)
BILIRUB SERPL-MCNC: 0.5 MG/DL (ref 0.2–1.1)
BUN SERPL-MCNC: 15 MG/DL (ref 6–23)
CALCIUM SERPL-MCNC: 9.3 MG/DL (ref 8.3–10.4)
CEA SERPL-MCNC: 1 NG/ML (ref 0–3)
CHLORIDE SERPL-SCNC: 110 MMOL/L (ref 101–110)
CO2 SERPL-SCNC: 26 MMOL/L (ref 21–32)
CREAT SERPL-MCNC: 0.6 MG/DL (ref 0.6–1)
DIFFERENTIAL METHOD BLD: ABNORMAL
EOSINOPHIL # BLD: 0.1 K/UL (ref 0–0.8)
EOSINOPHIL NFR BLD: 3 % (ref 0.5–7.8)
ERYTHROCYTE [DISTWIDTH] IN BLOOD BY AUTOMATED COUNT: 12.9 % (ref 11.9–14.6)
GLOBULIN SER CALC-MCNC: 3.1 G/DL (ref 2.8–4.5)
GLUCOSE SERPL-MCNC: 116 MG/DL (ref 65–100)
HCT VFR BLD AUTO: 39.5 % (ref 35.8–46.3)
HGB BLD-MCNC: 13.1 G/DL (ref 11.7–15.4)
IMM GRANULOCYTES # BLD AUTO: 0 K/UL (ref 0–0.5)
IMM GRANULOCYTES NFR BLD AUTO: 0 % (ref 0–5)
LYMPHOCYTES # BLD: 0.9 K/UL (ref 0.5–4.6)
LYMPHOCYTES NFR BLD: 22 % (ref 13–44)
MAGNESIUM SERPL-MCNC: 2.2 MG/DL (ref 1.8–2.4)
MCH RBC QN AUTO: 31 PG (ref 26.1–32.9)
MCHC RBC AUTO-ENTMCNC: 33.2 G/DL (ref 31.4–35)
MCV RBC AUTO: 93.6 FL (ref 82–102)
MONOCYTES # BLD: 0.3 K/UL (ref 0.1–1.3)
MONOCYTES NFR BLD: 8 % (ref 4–12)
NEUTS SEG # BLD: 2.7 K/UL (ref 1.7–8.2)
NEUTS SEG NFR BLD: 67 % (ref 43–78)
NRBC # BLD: 0 K/UL (ref 0–0.2)
PLATELET # BLD AUTO: 139 K/UL (ref 150–450)
PMV BLD AUTO: 10.7 FL (ref 9.4–12.3)
POTASSIUM SERPL-SCNC: 3.9 MMOL/L (ref 3.5–5.1)
PROT SERPL-MCNC: 7.1 G/DL (ref 6.3–8.2)
RBC # BLD AUTO: 4.22 M/UL (ref 4.05–5.2)
SODIUM SERPL-SCNC: 142 MMOL/L (ref 133–143)
WBC # BLD AUTO: 4 K/UL (ref 4.3–11.1)

## 2023-04-17 PROCEDURE — 36415 COLL VENOUS BLD VENIPUNCTURE: CPT

## 2023-04-17 PROCEDURE — 99215 OFFICE O/P EST HI 40 MIN: CPT | Performed by: INTERNAL MEDICINE

## 2023-04-17 PROCEDURE — 83735 ASSAY OF MAGNESIUM: CPT

## 2023-04-17 PROCEDURE — 82378 CARCINOEMBRYONIC ANTIGEN: CPT

## 2023-04-17 PROCEDURE — 85025 COMPLETE CBC W/AUTO DIFF WBC: CPT

## 2023-04-17 PROCEDURE — 80053 COMPREHEN METABOLIC PANEL: CPT

## 2023-04-17 ASSESSMENT — PATIENT HEALTH QUESTIONNAIRE - PHQ9
1. LITTLE INTEREST OR PLEASURE IN DOING THINGS: 0
SUM OF ALL RESPONSES TO PHQ QUESTIONS 1-9: 0
SUM OF ALL RESPONSES TO PHQ9 QUESTIONS 1 & 2: 0
SUM OF ALL RESPONSES TO PHQ QUESTIONS 1-9: 0
2. FEELING DOWN, DEPRESSED OR HOPELESS: 0

## 2023-04-17 NOTE — PROGRESS NOTES
I saw patient today with Dr Александр Steele. She started Xeloda 2 weeks ago. She is doing well. Dr Александр Steele communicated w patient and her  in [facility]. Pt will return in 1 month per Dr Александр Steele.  Nurse navigation will sign off unless needed

## 2023-04-17 NOTE — PROGRESS NOTES
[facility] Hematology & Oncology: Office Visit Progress Note    Chief Complaint:    Rectal cancer    History of Present Illness:  Reason for Referral: Rectal cancer     Referring Provider:  Kory Wick MD     Primary Care Provider: Teagan Moy DO     Family History of Cancer/Hematologic Disorders: None noted     Presenting Symptoms: LLQ abdominal pain     Ms. Ten Samuel is a 49 y.o.  female who reports to have never used tobacco products. She denies use of alcohol or drug substances. Her medical history reports as rectal bleeding. Her surgical history reports as  and colonoscopy. On 7/15/22, Ms. Ten Samuel saw Dr Breana Metzger for her newly diagnosed rectal cancer. She reported to have had rectal bleeding for 2 months which prompted her first colonoscopy. A 5 cm half circumferential friable mass was identified at the most proximal valve of Singh, biopsy revealed at least carcinoma in situ. 2022 CT scan of c/a/p demonstrated no distal metastases. She reported intermittent left lower quadrant abdominal pain for years. She has EUS scheduled for August however Dr Breana Metzger reached out to Dr Aubrey Palma to move up her planned EUS. A referral was placed to hematology for consideration of neoadjuvant chemoradiation. Currently there is no referral to radiation oncology. Her 2022 CBC and CMP reported WNL except for a low creatinine of 0.47.     22 Colonoscopy         2022 Surgical Pathology  DIAGNOSIS  A:  \"RECTAL MASS BIOPSIES\":        FRAGMENTS OF AT LEAST IN SITU ADENOCARCINOMA. SEE COMMENT. COMMENT: BECAUSE BIOPSY FRAGMENTS ARE LIMITED PRIMARIALY TO MUCOSA,   SUBMUCOSAL INFILTRATION CANNOT BE RULED OUT   B:  \"RANDOM COLON BIOPSIES\":        FRAGMENTS OF HISTOLOGICALLY UNREMARKABLE LARGE INTESTINE   C:  \"ASCENDING COLON POLYP\":        FRAGMENT OF MIXED TUBULOVILLOUS ADENOMA     22 CT Chest Abdomen and Pelvis  FINDINGS:  Lungs: No pulmonary nodules. No airspace disease.   Airways: Trachea and proximal bronchi grossly

## 2023-04-17 NOTE — PATIENT INSTRUCTIONS
Patient Instructions from Today's Visit    Reason for Visit:  Prechemo visit-Xeloda    Diagnosis Information:  [email or web address] [email or web address]-[email or web address]-patient-education-materials/[email or web address]-xxkr-sheets    Plan:  -Continue Xeloda  -We will schedule to have port removed  -Continue to use lotions to hands and feet    Follow Up:  As scheduled    Recent Lab Results:  Hospital Outpatient Visit on 04/17/2023   Component Date Value Ref Range Status    WBC 04/17/2023 4.0 (L)  4.3 - 11.1 K/uL Final    RBC 04/17/2023 4.22  4.05 - 5.2 M/uL Final    Hemoglobin 04/17/2023 13.1  11.7 - 15.4 g/dL Final    Hematocrit 04/17/2023 39.5  35.8 - 46.3 % Final    MCV 04/17/2023 93.6  82.0 - 102.0 FL Final    MCH 04/17/2023 31.0  26.1 - 32.9 PG Final    MCHC 04/17/2023 33.2  31.4 - 35.0 g/dL Final    RDW 04/17/2023 12.9  11.9 - 14.6 % Final    Platelets 07/52/1125 139 (L)  150 - 450 K/uL Final    MPV 04/17/2023 10.7  9.4 - 12.3 FL Final    nRBC 04/17/2023 0.00  0.0 - 0.2 K/uL Final    **Note: Absolute NRBC parameter is now reported with Hemogram**    Differential Type 04/17/2023 AUTOMATED    Final    Seg Neutrophils 04/17/2023 67  43 - 78 % Final    Lymphocytes 04/17/2023 22  13 - 44 % Final    Monocytes 04/17/2023 8  4.0 - 12.0 % Final    Eosinophils % 04/17/2023 3  0.5 - 7.8 % Final    Basophils 04/17/2023 1  0.0 - 2.0 % Final    Immature Granulocytes 04/17/2023 0  0.0 - 5.0 % Final    Segs Absolute 04/17/2023 2.7  1.7 - 8.2 K/UL Final    Absolute Lymph # 04/17/2023 0.9  0.5 - 4.6 K/UL Final    Absolute Mono # 04/17/2023 0.3  0.1 - 1.3 K/UL Final    Absolute Eos # 04/17/2023 0.1  0.0 - 0.8 K/UL Final    Basophils Absolute 04/17/2023 0.0  0.0 - 0.2 K/UL Final    Absolute Immature Granulocyte 04/17/2023 0.0  0.0 - 0.5 K/UL Final        Treatment Summary has been discussed and given to patient: n/a        -------------------------------------------------------------------------------------------------------------------  Please call

## 2023-05-01 ENCOUNTER — PATIENT MESSAGE (OUTPATIENT)
Dept: ONCOLOGY | Age: 48
End: 2023-05-01

## 2023-05-01 DIAGNOSIS — C20 RECTAL CANCER (HCC): Primary | ICD-10-CM

## 2023-05-04 DIAGNOSIS — C20 RECTAL CANCER (HCC): Primary | ICD-10-CM

## 2023-05-15 ENCOUNTER — TELEPHONE (OUTPATIENT)
Dept: ONCOLOGY | Age: 48
End: 2023-05-15

## 2023-05-17 ENCOUNTER — HOSPITAL ENCOUNTER (OUTPATIENT)
Dept: LAB | Age: 48
Discharge: HOME | End: 2023-05-20
Payer: COMMERCIAL

## 2023-05-17 ENCOUNTER — OFFICE VISIT (OUTPATIENT)
Dept: ONCOLOGY | Age: 48
End: 2023-05-17
Payer: COMMERCIAL

## 2023-05-17 VITALS
RESPIRATION RATE: 15 BRPM | SYSTOLIC BLOOD PRESSURE: 116 MMHG | WEIGHT: 115.8 LBS | TEMPERATURE: 98.1 F | HEART RATE: 75 BPM | HEIGHT: 61 IN | BODY MASS INDEX: 21.86 KG/M2 | OXYGEN SATURATION: 94 % | DIASTOLIC BLOOD PRESSURE: 74 MMHG

## 2023-05-17 DIAGNOSIS — C20 RECTAL CANCER (HCC): Primary | ICD-10-CM

## 2023-05-17 DIAGNOSIS — C20 RECTAL CANCER (HCC): ICD-10-CM

## 2023-05-17 DIAGNOSIS — Z79.899 HIGH RISK MEDICATION USE: ICD-10-CM

## 2023-05-17 DIAGNOSIS — T45.1X5A CHEMOTHERAPY-INDUCED THROMBOCYTOPENIA: ICD-10-CM

## 2023-05-17 DIAGNOSIS — D69.59 CHEMOTHERAPY-INDUCED THROMBOCYTOPENIA: ICD-10-CM

## 2023-05-17 LAB
ALBUMIN SERPL-MCNC: 3.9 G/DL (ref 3.5–5)
ALBUMIN/GLOB SERPL: 1.1 (ref 0.4–1.6)
ALP SERPL-CCNC: 60 U/L (ref 50–136)
ALT SERPL-CCNC: 15 U/L (ref 12–65)
ANION GAP SERPL CALC-SCNC: 8 MMOL/L (ref 2–11)
AST SERPL-CCNC: 8 U/L (ref 15–37)
BASOPHILS # BLD: 0 K/UL (ref 0–0.2)
BASOPHILS NFR BLD: 0 % (ref 0–2)
BILIRUB SERPL-MCNC: 0.6 MG/DL (ref 0.2–1.1)
BUN SERPL-MCNC: 14 MG/DL (ref 6–23)
CALCIUM SERPL-MCNC: 9 MG/DL (ref 8.3–10.4)
CEA SERPL-MCNC: 1.1 NG/ML (ref 0–3)
CHLORIDE SERPL-SCNC: 109 MMOL/L (ref 101–110)
CO2 SERPL-SCNC: 26 MMOL/L (ref 21–32)
CREAT SERPL-MCNC: 0.5 MG/DL (ref 0.6–1)
DIFFERENTIAL METHOD BLD: ABNORMAL
EOSINOPHIL # BLD: 0.1 K/UL (ref 0–0.8)
EOSINOPHIL NFR BLD: 2 % (ref 0.5–7.8)
ERYTHROCYTE [DISTWIDTH] IN BLOOD BY AUTOMATED COUNT: 15.1 % (ref 11.9–14.6)
GLOBULIN SER CALC-MCNC: 3.4 G/DL (ref 2.8–4.5)
GLUCOSE SERPL-MCNC: 102 MG/DL (ref 65–100)
HCT VFR BLD AUTO: 39.6 % (ref 35.8–46.3)
HGB BLD-MCNC: 13.1 G/DL (ref 11.7–15.4)
IMM GRANULOCYTES # BLD AUTO: 0 K/UL (ref 0–0.5)
IMM GRANULOCYTES NFR BLD AUTO: 1 % (ref 0–5)
LYMPHOCYTES # BLD: 0.7 K/UL (ref 0.5–4.6)
LYMPHOCYTES NFR BLD: 23 % (ref 13–44)
MAGNESIUM SERPL-MCNC: 2.2 MG/DL (ref 1.8–2.4)
MCH RBC QN AUTO: 31.7 PG (ref 26.1–32.9)
MCHC RBC AUTO-ENTMCNC: 33.1 G/DL (ref 31.4–35)
MCV RBC AUTO: 95.9 FL (ref 82–102)
MONOCYTES # BLD: 0.3 K/UL (ref 0.1–1.3)
MONOCYTES NFR BLD: 8 % (ref 4–12)
NEUTS SEG # BLD: 2.1 K/UL (ref 1.7–8.2)
NEUTS SEG NFR BLD: 66 % (ref 43–78)
NRBC # BLD: 0 K/UL (ref 0–0.2)
PLATELET # BLD AUTO: 140 K/UL (ref 150–450)
PMV BLD AUTO: 11.2 FL (ref 9.4–12.3)
POTASSIUM SERPL-SCNC: 3.8 MMOL/L (ref 3.5–5.1)
PROT SERPL-MCNC: 7.3 G/DL (ref 6.3–8.2)
RBC # BLD AUTO: 4.13 M/UL (ref 4.05–5.2)
SODIUM SERPL-SCNC: 143 MMOL/L (ref 133–143)
WBC # BLD AUTO: 3.2 K/UL (ref 4.3–11.1)

## 2023-05-17 PROCEDURE — 36415 COLL VENOUS BLD VENIPUNCTURE: CPT

## 2023-05-17 PROCEDURE — 83735 ASSAY OF MAGNESIUM: CPT

## 2023-05-17 PROCEDURE — 85025 COMPLETE CBC W/AUTO DIFF WBC: CPT

## 2023-05-17 PROCEDURE — 99215 OFFICE O/P EST HI 40 MIN: CPT | Performed by: INTERNAL MEDICINE

## 2023-05-17 PROCEDURE — 82378 CARCINOEMBRYONIC ANTIGEN: CPT

## 2023-05-17 PROCEDURE — 80053 COMPREHEN METABOLIC PANEL: CPT

## 2023-05-17 ASSESSMENT — PATIENT HEALTH QUESTIONNAIRE - PHQ9
SUM OF ALL RESPONSES TO PHQ QUESTIONS 1-9: 0
SUM OF ALL RESPONSES TO PHQ9 QUESTIONS 1 & 2: 0
SUM OF ALL RESPONSES TO PHQ QUESTIONS 1-9: 0
SUM OF ALL RESPONSES TO PHQ QUESTIONS 1-9: 0
1. LITTLE INTEREST OR PLEASURE IN DOING THINGS: 0
2. FEELING DOWN, DEPRESSED OR HOPELESS: 0
SUM OF ALL RESPONSES TO PHQ QUESTIONS 1-9: 0

## 2023-05-17 NOTE — PROGRESS NOTES
daily Monday through Friday for 3 months, returned on 5/17/2023, still with leukopenia and thrombocytopenia but okay to proceed to last month of adjuvant capecitabine, with stable nausea controlled, discussed surveillance and risk reduction lifestyle, arrange to repeat annual CT and colonoscopy and return to follow-up afterward, call as needed. All questions are answered to their satisfaction. They will call for further questions and concerns. ECOG PERFORMANCE STATUS - 0-Fully active, able to carry on all pre-disease performance without restriction. Pain - 0 - No pain/10. None/Minimal pain - not affecting QOL     Fatigue - No flowsheet data found. Distress - No flowsheet data found. Elements of this note have been dictated via voice recognition software. Text and phrases may be limited by the accuracy and autoconversion of the software. The chart has been reviewed, but errors may still be present. Donna Jimenez M.D.   [facility] [address]  Office : [phone]  Fax : [phone]

## 2023-05-17 NOTE — PATIENT INSTRUCTIONS
Patient Instructions from Today's Visit    Reason for Visit:  Follow up visit    Diagnosis Information:  [email or web address] [email or web address]-us/[email or web address]-answers-patient-education-materials/tvhj-gsebrhn-kmdt-sheets      Plan:  -Receive colonoscopy one year after the surgery (2-2024) and yearly  -Eat more tree nuts  -Control weight  -Eat low fat, low carb, high protein diet  -Exercise 30min day 5 days a week  -CT prior to visit next year  -Take an 81mg aspirin daily and Vitamin D    Follow Up:  As scheduled    Recent Lab Results:  Hospital Outpatient Visit on 05/17/2023   Component Date Value Ref Range Status    WBC 05/17/2023 3.2 (L)  4.3 - 11.1 K/uL Final    RBC 05/17/2023 4.13  4.05 - 5.2 M/uL Final    Hemoglobin 05/17/2023 13.1  11.7 - 15.4 g/dL Final    Hematocrit 05/17/2023 39.6  35.8 - 46.3 % Final    MCV 05/17/2023 95.9  82.0 - 102.0 FL Final    MCH 05/17/2023 31.7  26.1 - 32.9 PG Final    MCHC 05/17/2023 33.1  31.4 - 35.0 g/dL Final    RDW 05/17/2023 15.1 (H)  11.9 - 14.6 % Final    Platelets 74/94/9992 140 (L)  150 - 450 K/uL Final    MPV 05/17/2023 11.2  9.4 - 12.3 FL Final    nRBC 05/17/2023 0.00  0.0 - 0.2 K/uL Final    **Note: Absolute NRBC parameter is now reported with Hemogram**    Differential Type 05/17/2023 AUTOMATED    Final    Neutrophils % 05/17/2023 66  43 - 78 % Final    Lymphocytes % 05/17/2023 23  13 - 44 % Final    Monocytes % 05/17/2023 8  4.0 - 12.0 % Final    Eosinophils % 05/17/2023 2  0.5 - 7.8 % Final    Basophils % 05/17/2023 0  0.0 - 2.0 % Final    Immature Granulocytes 05/17/2023 1  0.0 - 5.0 % Final    Neutrophils Absolute 05/17/2023 2.1  1.7 - 8.2 K/UL Final    Lymphocytes Absolute 05/17/2023 0.7  0.5 - 4.6 K/UL Final    Monocytes Absolute 05/17/2023 0.3  0.1 - 1.3 K/UL Final    Eosinophils Absolute 05/17/2023 0.1  0.0 - 0.8 K/UL Final    Basophils Absolute 05/17/2023 0.0  0.0 - 0.2 K/UL Final    Absolute Immature Granulocyte 05/17/2023 0.0  0.0 - 0.5 K/UL Final        Treatment Summary

## 2023-05-18 ENCOUNTER — HOSPITAL ENCOUNTER (OUTPATIENT)
Dept: INTERVENTIONAL RADIOLOGY/VASCULAR | Age: 48
Discharge: HOME | End: 2023-05-18
Payer: COMMERCIAL

## 2023-05-18 VITALS
RESPIRATION RATE: 16 BRPM | OXYGEN SATURATION: 97 % | HEART RATE: 80 BPM | TEMPERATURE: 98.7 F | SYSTOLIC BLOOD PRESSURE: 98 MMHG | DIASTOLIC BLOOD PRESSURE: 52 MMHG

## 2023-05-18 DIAGNOSIS — C20 RECTAL CANCER (HCC): ICD-10-CM

## 2023-05-18 PROCEDURE — 36590 REMOVAL TUNNELED CV CATH: CPT

## 2023-05-18 PROCEDURE — 2500000003 HC RX 250 WO HCPCS: Performed by: PHYSICIAN ASSISTANT

## 2023-05-18 RX ORDER — LIDOCAINE HYDROCHLORIDE AND EPINEPHRINE BITARTRATE 20; .01 MG/ML; MG/ML
INJECTION, SOLUTION SUBCUTANEOUS PRN
Status: COMPLETED | OUTPATIENT
Start: 2023-05-18 | End: 2023-05-18

## 2023-05-18 RX ADMIN — LIDOCAINE HYDROCHLORIDE,EPINEPHRINE BITARTRATE 7 ML: 20; .01 INJECTION, SOLUTION INFILTRATION; PERINEURAL at 14:39

## 2023-05-18 NOTE — DISCHARGE INSTRUCTIONS
[facility]     Department of Interventional Radiology     [facility] Radiology     [phone] Office     [phone] Fax         DRAIN/PORT/CATHETER REMOVAL DISCHARGE INSTRUCTIONS           General Information:        Your doctor has ordered for us to remove your drain, port, or catheter. This could be that you do not need it anymore, it is not doing its job, your physician has decided on another plan for your treatment and/or it may need replacing. Home Care Instructions: You can resume your regular diet and medication regimen. Do not drink alcohol, drive, or make any important legal decisions in the next 24 hours. Do not lift anything heavier than a gallon of milk, or do anything strenuous for the next 24 hours. You will notice a dressing over the site of the removal. This dressing should stay in place until the site is healed. The dressing should be changed at least every 48 hours. You should change the dressing sooner if it becomes soiled or wet. The nurse who discharges you to home should review with you any wound care instructions. Resume your normal level of activity slowly. You may shower after 24 hours, but do not take a bath or swim or immerse yourself in water until the site has healed, and keep the dressing dry with plastic wrap while showering. The site may ooze for a couple of days. This drainage should lessen with each passing day. Call If:        You should call your Physician and/or the Radiology Nurse if you have any bleeding other than a small spot on your bandage. Call if you have any signs of infection, fever, or increased pain at the site of the tube. Call if the oozing increases, if it changes color, or begins to have an odor. Follow-Up Instructions: Please see your ordering doctor as he/she has requested. To Reach Us:        If you have any questions about your procedure, please call the

## 2023-05-18 NOTE — OR NURSING
Office Visit    Patient Name: Masoud Osorio    : 1956  MRN: [id number]    Subjective:  Masoud is a 61 y.o. male who presents today for:    No chief complaint on file.    Masoud Osorio presents today for annual wellness exam.  He also follows with Dr. Bailey cardiology due to history of NSTEMI in 2016.  Long-term medical management includes Lipitor 80, aspirin 81 mg daily and Plavix 75 mg daily      They have been feeling overall well but has had some increased anxiety/irritability worse since he quit smoking and his recent heart attack. At our most recent visit in 2017 we increased his citalopram to 40 mg daily and he has been feeling about the same.      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 Weight is overall stable in last 3 months, but he has at time been about 10 lbs lighter in the last year.      Immunizations: TDaP up to date within ten 10 years and flu shot through work, shingles vaccine 2017     Screening Tests: colonoscopy 17- repeat 3-5 years      Eye/Dental: up to date          Past Medical History  Past Medical History:   Diagnosis Date    Anxiety and depression 2016    trial of ctialopram and follow up in 8 weeks    Coronary artery disease involving native coronary artery of native heart without angina pectoris 2016 NSTEMI at [facility]   PCI of RCA with 4.0 x 15, 4.0 x 38, and 3.5 x 38 mm Resolute JACOB   PCI of PDA 2.75 x 15 mm JACOB dilated to 3.0 POBA of proximal PLB 2.0 x 12 mm balloon   Normal EF with LVEDP 15 mmHg DAPT score of 2               Gout     History of coronary artery stent placement 2016    History of heart attack 2016    NSTEMI    Mixed hyperlipidemia 2017    Overweight (BMI 25.0-29.9) 2016    Tobacco  Recovery period without difficulty. Pt alert and oriented and denies pain. Dressing is clean, dry, and intact. Reviewed discharge instructions with patient and spouse, both verbalized understanding. Pt escorted to [facility] discharge area r. Vital signs and Víctor score completed. Redness to right chest cleared prior to patient discharging. dependence in remission 7/25/2016       Past Surgical History  Past Surgical History:   Procedure Laterality Date    COLONOSCOPY N/A 8/16/2017    Procedure: COLONOSCOPY Split Prep;  Surgeon: Maykel Carcamo Jr., MD;  Location: [facility];  Service: Endoscopy;  Laterality: N/A;    CORONARY ANGIOPLASTY WITH STENT PLACEMENT      ROTATOR CUFF REPAIR         Family History  Family History   Problem Relation Age of Onset    Heart disease Mother     Arthritis Mother     Parkinsonism Mother     Congenital heart disease Father     No Known Problems Sister     No Known Problems Brother     No Known Problems Daughter     No Known Problems Sister     No Known Problems Sister        Social History  Social History     Social History    Marital status:      Spouse name: N/A    Number of children: N/A    Years of education: N/A     Occupational History    Not on file.     Social History Main Topics    Smoking status: Former Smoker     Packs/day: 0.50     Types: Cigarettes     Quit date: 7/25/2016    Smokeless tobacco: Never Used    Alcohol use Yes      Comment: rarely    Drug use: No    Sexual activity: Not on file     Other Topics Concern    Not on file     Social History Narrative    No narrative on file       Current Medications  Medications reviewed and updated.     Allergies   Review of patient's allergies indicates:   Allergen Reactions    Penicillins Hives       Review of Systems (Pertinent positives)  Review of Systems   Constitutional: Negative for unexpected weight change.   HENT: Positive for congestion (mild).    Eyes: Negative for visual disturbance.   Respiratory: Negative for chest tightness and shortness of breath.    Cardiovascular: Negative for chest pain and leg swelling.   Gastrointestinal: Negative for constipation and diarrhea.   Genitourinary: Negative for difficulty urinating and dysuria.   Musculoskeletal: Positive for arthralgias (left knee). Negative for back pain.  "  Allergic/Immunologic: Positive for environmental allergies (mild).   Neurological: Negative for dizziness.   Psychiatric/Behavioral: Positive for sleep disturbance. The patient is nervous/anxious.        /78   Pulse 62   Ht 5' 8" (1.727 m)   Wt 90.2 kg (198 lb 13.7 oz)   SpO2 96%   BMI 30.24 kg/m²     Physical Exam   Constitutional: He is oriented to person, place, and time. He appears well-developed and well-nourished. No distress.   HENT:   Head: Normocephalic and atraumatic.   Right Ear: External ear normal.   Left Ear: External ear normal.   Nose: Nose normal.   Mouth/Throat: Oropharynx is clear and moist. No oropharyngeal exudate.   Eyes: Conjunctivae are normal. No scleral icterus.   Neck: No tracheal deviation present. No thyromegaly present.   Cardiovascular: Normal rate, regular rhythm, normal heart sounds and intact distal pulses.    Pulmonary/Chest: Effort normal and breath sounds normal.   Abdominal: Soft. Bowel sounds are normal. He exhibits no distension and no mass. There is no tenderness. No hernia.   Musculoskeletal: Normal range of motion.   Lymphadenopathy:     He has no cervical adenopathy.   Neurological: He is alert and oriented to person, place, and time. He has normal reflexes.   Skin: Skin is warm and dry. No rash noted.   Psychiatric: He has a normal mood and affect.   Vitals reviewed.        Assessment/Plan:  Masoud Osorio is a 61 y.o. male who presents today for :    Diagnoses and all orders for this visit:    Routine general medical examination at a health care facility    Overweight (BMI 25.0-29.9)    Coronary artery disease involving native coronary artery of native heart without angina pectoris    Tobacco dependence in remission  Comments:  Patient remains tobacco free since 7/25/2016.     Mixed hyperlipidemia    History of heart attack    History of coronary artery stent placement    Long-term use of aspirin therapy    Visit for monitoring Plavix therapy    History of " colon polyps    Medication management    Anxiety and depression  -     sertraline (ZOLOFT) 100 MG tablet; Take 1 tablet (100 mg total) by mouth once daily.    Pain and swelling of left knee            ICD-10-CM ICD-9-CM    1. Routine general medical examination at a health care facility Z00.00 V70.0    2. Overweight (BMI 25.0-29.9) E66.3 278.02    3. Coronary artery disease involving native coronary artery of native heart without angina pectoris I25.10 414.01    4. Tobacco dependence in remission F17.201 V15.82     Patient remains tobacco free since 7/25/2016.    5. Mixed hyperlipidemia E78.2 272.2    6. History of heart attack I25.2 412    7. History of coronary artery stent placement Z95.5 V45.82    8. Long-term use of aspirin therapy Z79.82 V58.66    9. Visit for monitoring Plavix therapy Z51.81 V58.83     Z79.02 V58.63    10. History of colon polyps Z86.010 V12.72    11. Medication management Z79.899 V58.69    12. Anxiety and depression F41.8 300.00 sertraline (ZOLOFT) 100 MG tablet     311    13. Pain and swelling of left knee M25.562 719.46     M25.462 719.06        Patient Instructions   direct switch from Citalopram to Zoloft 200 mg daily.  Check records for date of tetanus (ie TDaP) booster.         Return in about 1 year (around 10/11/2018) for return as needed for new concerns.

## 2023-05-18 NOTE — OR NURSING
TRANSFER - OUT REPORT:           Verbal report given to [facility] on Sola Sellers  being transferred to [facility]  for  routine progression of patient care      Report consisted of patients Situation, Background, Assessment and Recommendations(SBAR). Information from the following report(s) Procedure Summary was reviewed with the receiving nurse. Opportunity for questions and clarification was provided. Conscious Sedation:    0 Mcg of Fentanyl administered   0 Mg of Versed administered   0 Mg of Benadryl administered        Pt tolerated procedure well.           Chest  Other: dermabond clean, dry, intact, and nontender    VITALS:  BP (!) 123/59   Pulse 76   Temp 98.7 °F (37.1 °C) (Oral)   Resp 16   SpO2 98%

## 2024-03-12 ENCOUNTER — TELEPHONE (OUTPATIENT)
Dept: ONCOLOGY | Age: 49
End: 2024-03-12

## 2024-03-12 DIAGNOSIS — C20 RECTAL CANCER (HCC): Primary | ICD-10-CM

## 2024-03-12 NOTE — TELEPHONE ENCOUNTER
Called patient via  to notify she ahs appt for CT on 3/14 at 10:30 and follow up appt with Dr. Geiger on 3/20 at 10:30am labs and to see the doctor after. LVM with information.

## 2024-03-14 ENCOUNTER — HOSPITAL ENCOUNTER (OUTPATIENT)
Dept: CT IMAGING | Age: 49
Discharge: HOME | End: 2024-03-14
Attending: INTERNAL MEDICINE
Payer: COMMERCIAL

## 2024-03-14 DIAGNOSIS — C20 RECTAL CANCER (HCC): ICD-10-CM

## 2024-03-14 PROCEDURE — 71260 CT THORAX DX C+: CPT

## 2024-03-14 PROCEDURE — 6360000004 HC RX CONTRAST MEDICATION: Performed by: INTERNAL MEDICINE

## 2024-03-14 RX ADMIN — IOPAMIDOL 100 ML: 755 INJECTION, SOLUTION INTRAVENOUS at 10:37

## 2024-03-14 RX ADMIN — DIATRIZOATE MEGLUMINE AND DIATRIZOATE SODIUM 15 ML: 660; 100 LIQUID ORAL; RECTAL at 10:38

## 2024-03-20 ENCOUNTER — OFFICE VISIT (OUTPATIENT)
Dept: ONCOLOGY | Age: 49
End: 2024-03-20
Payer: COMMERCIAL

## 2024-03-20 ENCOUNTER — HOSPITAL ENCOUNTER (OUTPATIENT)
Dept: LAB | Age: 49
Discharge: HOME | End: 2024-03-23
Payer: COMMERCIAL

## 2024-03-20 VITALS
TEMPERATURE: 97.3 F | SYSTOLIC BLOOD PRESSURE: 123 MMHG | WEIGHT: 110.7 LBS | DIASTOLIC BLOOD PRESSURE: 83 MMHG | HEART RATE: 78 BPM | OXYGEN SATURATION: 97 % | HEIGHT: 61 IN | RESPIRATION RATE: 18 BRPM | BODY MASS INDEX: 20.9 KG/M2

## 2024-03-20 DIAGNOSIS — Z85.048 HISTORY OF RECTAL CANCER: Primary | ICD-10-CM

## 2024-03-20 DIAGNOSIS — T45.1X5A ANEMIA DUE TO ANTINEOPLASTIC CHEMOTHERAPY: ICD-10-CM

## 2024-03-20 DIAGNOSIS — D64.81 ANEMIA DUE TO ANTINEOPLASTIC CHEMOTHERAPY: ICD-10-CM

## 2024-03-20 DIAGNOSIS — C20 RECTAL CANCER (HCC): ICD-10-CM

## 2024-03-20 DIAGNOSIS — D69.59 CHEMOTHERAPY-INDUCED THROMBOCYTOPENIA: ICD-10-CM

## 2024-03-20 DIAGNOSIS — T45.1X5A CHEMOTHERAPY-INDUCED THROMBOCYTOPENIA: ICD-10-CM

## 2024-03-20 LAB
ALBUMIN SERPL-MCNC: 4 G/DL (ref 3.5–5)
ALBUMIN/GLOB SERPL: 1.2 (ref 0.4–1.6)
ALP SERPL-CCNC: 60 U/L (ref 50–136)
ALT SERPL-CCNC: 14 U/L (ref 12–65)
ANION GAP SERPL CALC-SCNC: 4 MMOL/L (ref 2–11)
AST SERPL-CCNC: 10 U/L (ref 15–37)
BASOPHILS # BLD: 0 K/UL (ref 0–0.2)
BASOPHILS NFR BLD: 1 % (ref 0–2)
BILIRUB SERPL-MCNC: 0.6 MG/DL (ref 0.2–1.1)
BUN SERPL-MCNC: 14 MG/DL (ref 6–23)
CALCIUM SERPL-MCNC: 9 MG/DL (ref 8.3–10.4)
CEA SERPL-MCNC: 1.7 NG/ML (ref 0–3)
CHLORIDE SERPL-SCNC: 107 MMOL/L (ref 103–113)
CO2 SERPL-SCNC: 29 MMOL/L (ref 21–32)
CREAT SERPL-MCNC: 0.6 MG/DL (ref 0.6–1)
DIFFERENTIAL METHOD BLD: ABNORMAL
EOSINOPHIL # BLD: 0 K/UL (ref 0–0.8)
EOSINOPHIL NFR BLD: 1 % (ref 0.5–7.8)
ERYTHROCYTE [DISTWIDTH] IN BLOOD BY AUTOMATED COUNT: 12.8 % (ref 11.9–14.6)
GLOBULIN SER CALC-MCNC: 3.3 G/DL (ref 2.8–4.5)
GLUCOSE SERPL-MCNC: 111 MG/DL (ref 65–100)
HCT VFR BLD AUTO: 40.1 % (ref 35.8–46.3)
HGB BLD-MCNC: 13.2 G/DL (ref 11.7–15.4)
IMM GRANULOCYTES # BLD AUTO: 0 K/UL (ref 0–0.5)
IMM GRANULOCYTES NFR BLD AUTO: 0 % (ref 0–5)
LYMPHOCYTES # BLD: 0.8 K/UL (ref 0.5–4.6)
LYMPHOCYTES NFR BLD: 24 % (ref 13–44)
MCH RBC QN AUTO: 30.6 PG (ref 26.1–32.9)
MCHC RBC AUTO-ENTMCNC: 32.9 G/DL (ref 31.4–35)
MCV RBC AUTO: 93 FL (ref 82–102)
MONOCYTES # BLD: 0.2 K/UL (ref 0.1–1.3)
MONOCYTES NFR BLD: 6 % (ref 4–12)
NEUTS SEG # BLD: 2.4 K/UL (ref 1.7–8.2)
NEUTS SEG NFR BLD: 68 % (ref 43–78)
NRBC # BLD: 0 K/UL (ref 0–0.2)
PLATELET # BLD AUTO: 110 K/UL (ref 150–450)
PMV BLD AUTO: 11.4 FL (ref 9.4–12.3)
POTASSIUM SERPL-SCNC: 4.1 MMOL/L (ref 3.5–5.1)
PROT SERPL-MCNC: 7.3 G/DL (ref 6.3–8.2)
RBC # BLD AUTO: 4.31 M/UL (ref 4.05–5.2)
SODIUM SERPL-SCNC: 140 MMOL/L (ref 136–146)
WBC # BLD AUTO: 3.5 K/UL (ref 4.3–11.1)

## 2024-03-20 PROCEDURE — 85025 COMPLETE CBC W/AUTO DIFF WBC: CPT

## 2024-03-20 PROCEDURE — 36415 COLL VENOUS BLD VENIPUNCTURE: CPT

## 2024-03-20 PROCEDURE — 80053 COMPREHEN METABOLIC PANEL: CPT

## 2024-03-20 PROCEDURE — 82378 CARCINOEMBRYONIC ANTIGEN: CPT

## 2024-03-20 PROCEDURE — 99214 OFFICE O/P EST MOD 30 MIN: CPT | Performed by: INTERNAL MEDICINE

## 2024-03-20 ASSESSMENT — PATIENT HEALTH QUESTIONNAIRE - PHQ9
SUM OF ALL RESPONSES TO PHQ9 QUESTIONS 1 & 2: 0
SUM OF ALL RESPONSES TO PHQ QUESTIONS 1-9: 0
2. FEELING DOWN, DEPRESSED OR HOPELESS: NOT AT ALL
1. LITTLE INTEREST OR PLEASURE IN DOING THINGS: NOT AT ALL
SUM OF ALL RESPONSES TO PHQ QUESTIONS 1-9: 0

## 2024-03-20 NOTE — PROGRESS NOTES
last month of adjuvant capecitabine, with stable nausea controlled, discussed surveillance and risk reduction lifestyle, arrange to repeat annual CT and colonoscopy and return on 3/20/2024, discussed CT results showed JOEL CR and arrange surveillance colonoscopy again, monitor leukopenia and thrombocytopenia which are okay for procedure, arrange repeat CT in a year and return to follow but call as needed.  Review/visit/discussion/documentation of 30 minutes.      All questions are answered to their satisfaction. They will call for further questions and concerns.        ECOG PERFORMANCE STATUS - 0-Fully active, able to carry on all pre-disease performance without restriction.    Pain - 0 - No pain/10. None/Minimal pain - not affecting QOL     Fatigue - Failed to redirect to the Timeline version of the [employer] SmartLink.  Distress -        No data to display                Elements of this note have been dictated via voice recognition software.  Text and phrases may be limited by the accuracy and autoconversion of the software.  The chart has been reviewed, but errors may still be present.          Edd[place] [address]  Office : [phone]  Fax : [phone]

## 2024-03-20 NOTE — PATIENT INSTRUCTIONS
Patient Information from Today's Visit        Treatment Summary has been discussed and given to patient:N/A  Follow Up: 6 months    Labs reviewed.   CT reviewed. Will repeat CT in 6 months. You can call to schedule this at [phone].  [facility].     Hospital Outpatient Visit on 03/20/2024   Component Date Value Ref Range Status    Sodium 03/20/2024 140  136 - 146 mmol/L Final    Potassium 03/20/2024 4.1  3.5 - 5.1 mmol/L Final    Chloride 03/20/2024 107  103 - 113 mmol/L Final    CO2 03/20/2024 29  21 - 32 mmol/L Final    Anion Gap 03/20/2024 4  2 - 11 mmol/L Final    Glucose 03/20/2024 111 (H)  65 - 100 mg/dL Final    BUN 03/20/2024 14  6 - 23 MG/DL Final    Creatinine 03/20/2024 0.60  0.6 - 1.0 MG/DL Final    Est, Glom Filt Rate 03/20/2024 >60  >60 ml/min/1.73m2 Final    Comment:    Pediatric calculator link: https://www.kidney.org/professionals/kdoqi/gfr_calculatorped     These results are not intended for use in patients <18 years of age.     eGFR results are calculated without a race factor using  the 2021 CKD-EPI equation. Careful clinical correlation is recommended, particularly when comparing to results calculated using previous equations.  The CKD-EPI equation is less accurate in patients with extremes of muscle mass, extra-renal metabolism of creatinine, excessive creatine ingestion, or following therapy that affects renal tubular secretion.      Calcium 03/20/2024 9.0  8.3 - 10.4 MG/DL Final    Total Bilirubin 03/20/2024 0.6  0.2 - 1.1 MG/DL Final    ALT 03/20/2024 14  12 - 65 U/L Final    AST 03/20/2024 10 (L)  15 - 37 U/L Final    Alk Phosphatase 03/20/2024 60  50 - 136 U/L Final    Total Protein 03/20/2024 7.3  6.3 - 8.2 g/dL Final    Albumin 03/20/2024 4.0  3.5 - 5.0 g/dL Final    Globulin 03/20/2024 3.3  2.8 - 4.5 g/dL Final    Albumin/Globulin Ratio 03/20/2024 1.2  0.4 - 1.6   Final    CEA 03/20/2024 1.7  0.0 - 3.0 ng/mL Final    Comment: Nonsmoker:  <3.0

## 2025-03-15 NOTE — PROGRESS NOTES
[facility]: Office Visit Progress Note    Chief Complaint:    Rectal cancer    History of Present Illness:  Reason for Referral: Rectal cancer     Referring Provider:  Clementina Ramirez MD     Primary Care Provider: Trang Maldonado DO     Family History of Cancer/Hematologic Disorders: None noted     Presenting Symptoms: LLQ abdominal pain     Ms. Verona Parnell is a 49 y.o.  female who reports to have never used tobacco products. She denies use of alcohol or drug substances. Her medical history reports as rectal bleeding. Her surgical history reports as  and colonoscopy. On 7/15/22, Ms. Verona Parnell saw Dr Fabiola Wang for her newly diagnosed rectal cancer. She reported to have had rectal bleeding for 2 months which prompted her first colonoscopy. A 5 cm half circumferential friable mass was identified at the most proximal valve of Singh, biopsy revealed at least carcinoma in situ. 2022 CT scan of c/a/p demonstrated no distal metastases. She reported intermittent left lower quadrant abdominal pain for years. She has EUS scheduled for August however Dr Fabiola Wang reached out to Dr Festus Georges to move up her planned EUS. A referral was placed to hematology for consideration of neoadjuvant chemoradiation. Currently there is no referral to radiation oncology. Her 2022 CBC and CMP reported WNL except for a low creatinine of 0.47.     22 Colonoscopy         2022 Surgical Pathology  DIAGNOSIS  A:  \"RECTAL MASS BIOPSIES\":        FRAGMENTS OF AT LEAST IN SITU ADENOCARCINOMA. SEE COMMENT. COMMENT: BECAUSE BIOPSY FRAGMENTS ARE LIMITED PRIMARIALY TO MUCOSA,   SUBMUCOSAL INFILTRATION CANNOT BE RULED OUT   B:  \"RANDOM COLON BIOPSIES\":        FRAGMENTS OF HISTOLOGICALLY UNREMARKABLE LARGE INTESTINE   C:  \"ASCENDING COLON POLYP\":        FRAGMENT OF MIXED TUBULOVILLOUS ADENOMA     22 CT Chest Abdomen and Pelvis  FINDINGS:  Lungs: No pulmonary nodules. No airspace disease.   Airways: Trachea and proximal bronchi grossly patent. Pleura: No effusion or thickening or calcifications. Lymph Nodes: No enlarged axillary, hilar or mediastinal lymph nodes. Heart: Normal size. Coronaries: No definite calcifications. Aorta: Normal caliber. Esophagus: Thickening of the distal esophagus  Skeletal/Chest Wall: No gross bony lesions. Liver: A 4 mm cyst anterior segment right lobe, axial image 47, otherwise  unremarkable. Gallbladder: No gallstones identified  Bile Ducts: Not dilated. Pancreas: Unremarkable. Spleen: Uniform and normal size. Stomach: Unremarkable. Bowel: There is mural thickening of the rectum, probably a cyst 6 cm in length. Margins are indistinct. Small nodular densities probably represent mesorectal  node. Adrenals: Are normal size. Kidneys/Ureters: Enhance symmetrically. No hydronephrosis. Lymph Nodes: No grossly enlarged retroperitoneal, mesenteric, or pelvic  adenopathy. Vasculature: Aorta is normal caliber. Bones: No gross bony lesions. Other: No ascites. Impression  Mural thickening of the rectum suspicious for neoplasm. Margins are  indistinct. Probable small mesorectal lymph nodes. There is one small cyst in  liver. No pulmonary nodules. PET 15940:  1. Hypermetabolic rectal mass consistent with known rectal malignancy. No   evidence of metastatic disease in the neck, chest, abdomen or pelvis. Small   perirectal lymph nodes are present but may be too small to accurately   characterize by PET imaging. No FDG activity noted in these nodes. Review of Systems:  Constitutional Fatigue. Denies fever or chills. Denies weight loss or appetite changes. Denies anorexia. HEENT Denies trauma, bluring vision, hearing loss, ear pain, nosebleeds, sore throat, neck pain and ear discharge. Skin Denies lesions or rashes. Lungs Denies shortness of breath, cough, sputum production or hemoptysis. Cardiovascular Denies chest pain, palpitations, orthopnea, claudication and leg swelling. Gastrointestinal LLQ pain. Blood in stool resolved. Nausea controlled. Denies vomiting.  Denies dysuria, frequency or hesitancy of urination   Neuro Peripheral neuropathy. Denies headaches, visual changes or ataxia. Denies dizziness, tingling, tremors,  speech change, focal weakness and headaches. Hematology Denies nasal/gum bleeding, denies easy bruise   Endo Denies heat/cold intolerance, denies diabetes. MSK Denies back pain, swollen legs, myalgias and falls. Psychiatric/Behavioral Denies depression and substance abuse. The patient is not nervous/anxious.        Allergies   Allergen Reactions    Amoxicillin Rash     Past Medical History:   Diagnosis Date    Cancer ([facility] [address]     rectal cancer-- dx 2022---- followed by dr Cornelia Osorio-- per spouse after scans/test  then will determine what the plan is    Rectal bleeding     onset 2022     Past Surgical History:   Procedure Laterality Date     SECTION      COLECTOMY N/A 2023    ERA'S LAPAROSCOPIC LOW ANTERIOR COLON RESECTION WITH TOTAL MESORECTAL EXCISION performed by Hossein Robbins MD at [facility] MAIN OR    COLONOSCOPY      COLONOSCOPY N/A 2022    COLONOSCOPY ULTRASOUND/ BMI 19 performed by Ivanna Chaney MD at [facility] ENDOSCOPY    IR PORT PLACEMENT EQUAL OR GREATER THAN 5 YEARS  2022    IR PORT PLACEMENT EQUAL OR GREATER THAN 5 YEARS 2022 [facility]    SIGMOIDOSCOPY N/A 2023    SIGMOIDOSCOPY DIAGNOSTIC FLEXIBLE performed by Hossein Robbins MD at [facility] ENDOSCOPY     Family History   Problem Relation Age of Onset    High Blood Pressure Mother      Social History     Socioeconomic History    Marital status:      Spouse name: Not on file    Number of children: Not on file    Years of education: Not on file    Highest education level: Not on file   Occupational History    Not on file   Tobacco Use    Smoking status: Never    Smokeless tobacco: Never   Vaping Use    Vaping Use: Never used   Substance and Sexual Activity    Alcohol use: Yes     Comment: rare    Drug use: Never    Sexual activity: Not on file   Other Topics Concern    Not on file   Social History Narrative    Not on file     Social Determinants of Health     Financial Resource Strain: Not on file   Food Insecurity: Not on file   Transportation Needs: Not on file   Physical Activity: Not on file   Stress: Not on file   Social Connections: Not on file   Intimate Partner Violence: Not on file   Housing Stability: Not on file     Current Outpatient Medications   Medication Sig Dispense Refill    capecitabine (XELODA) 500 MG chemo tablet Take 2 tablets (1,000 mg total) by mouth 2 times daily Mon-Friday only 88 tablet 2    docusate sodium (COLACE, DULCOLAX) 100 MG CAPS Take 100 mg by mouth 2 times daily as needed for Constipation      gabapentin (NEURONTIN) 300 MG capsule Take 1 capsule by mouth 3 times daily for 15 days. 45 capsule 0    potassium chloride (KLOR-CON M) 20 MEQ extended release tablet Take 2 tablets by mouth daily (Patient not taking: Reported on 3/13/2023) 6 tablet 0     No current facility-administered medications for this visit. Facility-Administered Medications Ordered in Other Visits   Medication Dose Route Frequency Provider Last Rate Last Admin    sodium chloride flush 0.9 % injection 5-40 mL  5-40 mL IntraVENous PRN Mamie Barteltt MD   10 mL at 09/24/22 1644       OBJECTIVE:  /69 (Site: Right Upper Arm, Position: Standing)   Pulse 86   Temp 98 °F (36.7 °C)   Resp 14   Ht 5' 1\" (1.549 m)   Wt 116 lb 3.2 oz (52.7 kg)   SpO2 97%   BMI 21.96 kg/m²     Physical Exam:  Constitutional: Oriented to person, place, and time. Well-developed and well-nourished. HEENT: Normocephalic and atraumatic. Oropharynx is clear and moist.   Conjunctivae and EOM are normal. Pupils are equal, round, and reactive to light. No scleral icterus. Neck supple. No JVD present. No tracheal deviation present. No thyromegaly present.     Lymph node No palpable submandibular, cervical, supraclavicular, axillary and inguinal lymph nodes. Skin Warm and dry. No bruising and no rash noted. No erythema. No pallor. Respiratory Effort normal and breath sounds normal.  No respiratory distress. No wheezes. No rales. No tenderness. CVS Normal rate, regular rhythm and normal heart sounds. Exam reveals no gallop, no friction and no rub. No murmur heard. Abdomen Soft. Bowel sounds are normal. Exhibits no distension. There is no tenderness. There is no rebound and no guarding. Neuro Normal reflexes. No cranial nerve deficit. Exhibits normal muscle tone, 5 of 5 strength of all extremities. MSK Normal range of motion in general.  No edema and no tenderness.    Psych Normal mood, affect, behavior, judgment and thought content      Labs:  Recent Results (from the past 24 hour(s))   CBC with Auto Differential    Collection Time: 03/13/23  9:37 AM   Result Value Ref Range    WBC 3.9 (L) 4.3 - 11.1 K/uL    RBC 4.22 4.05 - 5.2 M/uL    Hemoglobin 13.0 11.7 - 15.4 g/dL    Hematocrit 40.5 35.8 - 46.3 %    MCV 96.0 82.0 - 102.0 FL    MCH 30.8 26.1 - 32.9 PG    MCHC 32.1 31.4 - 35.0 g/dL    RDW 11.9 11.9 - 14.6 %    Platelets 042 (L) 262 - 450 K/uL    MPV 11.1 9.4 - 12.3 FL    nRBC 0.00 0.0 - 0.2 K/uL    Seg Neutrophils 66 43 - 78 %    Lymphocytes 19 13 - 44 %    Monocytes 10 4.0 - 12.0 %    Eosinophils % 4 0.5 - 7.8 %    Basophils 1 0.0 - 2.0 %    Immature Granulocytes 0 0.0 - 5.0 %    Segs Absolute 2.6 1.7 - 8.2 K/UL    Absolute Lymph # 0.8 0.5 - 4.6 K/UL    Absolute Mono # 0.4 0.1 - 1.3 K/UL    Absolute Eos # 0.2 0.0 - 0.8 K/UL    Basophils Absolute 0.0 0.0 - 0.2 K/UL    Absolute Immature Granulocyte 0.0 0.0 - 0.5 K/UL    Differential Type AUTOMATED     Comprehensive Metabolic Panel    Collection Time: 03/13/23  9:37 AM   Result Value Ref Range    Sodium 140 133 - 143 mmol/L    Potassium 3.7 3.5 - 5.1 mmol/L    Chloride 108 101 - 110 mmol/L    CO2 28 21 - 32 mmol/L    Anion Gap 4 2 - 11 mmol/L    Glucose 114 (H) 65 - 100 mg/dL    BUN 18 6 - 23 MG/DL    Creatinine 0.60 0.6 - 1.0 MG/DL    Est, Glom Filt Rate >60 >60 ml/min/1.73m2    Calcium 9.1 8.3 - 10.4 MG/DL    Total Bilirubin 0.5 0.2 - 1.1 MG/DL    ALT 14 12 - 65 U/L    AST 9 (L) 15 - 37 U/L    Alk Phosphatase 50 50 - 136 U/L    Total Protein 6.7 6.3 - 8.2 g/dL    Albumin 3.7 3.5 - 5.0 g/dL    Globulin 3.0 2.8 - 4.5 g/dL    Albumin/Globulin Ratio 1.2 0.4 - 1.6     Magnesium    Collection Time: 03/13/23  9:37 AM   Result Value Ref Range    Magnesium 2.2 1.8 - 2.4 mg/dL   CEA    Collection Time: 03/13/23  9:37 AM   Result Value Ref Range    CEA 0.8 0.0 - 3.0 ng/mL       Imaging:  No results found for this or any previous visit. ASSESSMENT/PLAN:   Diagnosis Orders   1. Rectal cancer (HCC)  capecitabine (XELODA) 500 MG chemo tablet      2. High risk medication use        3. CINV (chemotherapy-induced nausea and vomiting)            49 y.o. F urgently consulted for rectal carcinoma.   She had good general baseline health, developed frequency of bowel movement and blood in stool for 2 months, colonoscopy showed low rectal adenocarcinoma and urgently consulted to oncology, discussed carcinogenesis and staging, urgent arrangement of EUS reported T3N0 disease but quite large avid disease on PET with small lymph nodes, discussed at tumor board and concern of level of local invasion, arrange rectal MRI, discussed neoadjuvant treatment options with patient and wife and desired to treat aggressively for maximal chance of CR, arrange JENNIFER in the manner of Prodige 23 (Neoadjuvant rectal cancer treatment with JENNIFER using three months of modified FOLFIRINOX (oxaliplatin 85 mg/m2, leucovorin 400 mg/m2, irinotecan 180 mg/m2 day 1, and FU 2400 mg/m2 over 46 hours every 14 days) followed by long-course CRT (50 Gy in 25 fractions plus concurrent capecitabine), TME, and three additional months of FOLFOX or capecitabine), we discussed toxicities and management, arrange port placement, antiemetics, Emla, IV iron, rectal MRI confirmed chief receive M0 disease, proceed to cycle 1 neoadjuvant FOLFIRINOX 8/11/2022, generally tolerated well, 4 bowel movements a day but not watery, there was left lower quadrant pain, nausea, discussed management accordingly, labs reviewed and CEA down, signs of clinical response but complaining of increased fatigue, increased neuropathy, thrombocytopenic, discussed the risk of chronic neuropathy and patient desired to continue and reduce oxaliplatin to 60 mg/m2 from cycle 5, return on 10/19/2022 and reporting numbness in feet persistent, received cycle 6 without oxaliplatin, plan to start chemoradiation next month and coordinate with radiation oncology, discussed she has generally tolerated well with wonderful clinical response and CEA normalized, we discussed that adjuvant chemotherapy is part of the plan per protocol but will address her desire and concern after surgery, return on 11/14/2022 labs reviewed, proceed to Xeloda radiation, feet numbness mostly recovered and continue to monitor, again discussed left lower quadrant pain developed seemed chronically fluctuating before cancer was diagnosed, which may be related with pelvic inflammation or pelvic endometriosis and I doubt related to cancer symptom, she will discuss with Dr. Diaz  to explore in surgery, return on 12/12/2022, platelet 89 and ANC down to 1.1, worsening pain at defecation, Proctofoam not enough for pain control, add tramadol, proceed to week 5 Xeloda radiation, reduce Xeloda to 1000 mg twice daily for neutropenia and ANC 1.8, proceed to last week chemoradiation, refill Proctofoam and Norco, hydration for hypotension, see Dr. Diaz  to arrange surgery done on 2/6/2023, remaining moderately differentiated adenocarcinoma, 2.4 cm [id number], postoperative recovery as expected, discussed the result and indication to complete adjuvant chemotherapy, she felt apprehended and reports still having numbness in the feet, discussed proper options with capecitabine for 3 months, return on 3/13/2023, mostly recovered and still having regular bowel movements and use bowel regimen as needed, again discussed further adjuvant therapy and decided to try capecitabine 1000 mg twice daily Monday through Friday for 3 months, refill Zofran, return in a month to follow but call as needed.    All questions are answered to their satisfaction. They will call for further questions and concerns.        ECOG PERFORMANCE STATUS - 0-Fully active, able to carry on all pre-disease performance without restriction.    Pain - 0 - No pain/10. None/Minimal pain - not affecting QOL     Fatigue - No flowsheet data found.  Distress - No flowsheet data found.    Elements of this note have been dictated via voice recognition software.  Text and phrases may be limited by the accuracy and autoconversion of the software.  The chart has been reviewed, but errors may still be present.          Eddie [place] [address]  Office : [phone]  Fax : [phone]     No (0)

## 2025-03-18 ENCOUNTER — HOSPITAL ENCOUNTER (OUTPATIENT)
Dept: CT IMAGING | Age: 50
Discharge: HOME | End: 2025-03-21
Attending: INTERNAL MEDICINE
Payer: COMMERCIAL

## 2025-03-18 DIAGNOSIS — Z85.048 HISTORY OF RECTAL CANCER: ICD-10-CM

## 2025-03-18 PROCEDURE — 6360000004 HC RX CONTRAST MEDICATION: Performed by: INTERNAL MEDICINE

## 2025-03-18 PROCEDURE — 74177 CT ABD & PELVIS W/CONTRAST: CPT

## 2025-03-18 RX ORDER — IOPAMIDOL 755 MG/ML
100 INJECTION, SOLUTION INTRAVASCULAR
Status: COMPLETED | OUTPATIENT
Start: 2025-03-18 | End: 2025-03-18

## 2025-03-18 RX ORDER — DIATRIZOATE MEGLUMINE AND DIATRIZOATE SODIUM 660; 100 MG/ML; MG/ML
15 SOLUTION ORAL; RECTAL
Status: DISCONTINUED | OUTPATIENT
Start: 2025-03-18 | End: 2025-03-22 | Stop reason: HOSPADM

## 2025-03-18 RX ADMIN — IOPAMIDOL 100 ML: 755 INJECTION, SOLUTION INTRAVENOUS at 10:30

## 2025-03-18 RX ADMIN — DIATRIZOATE MEGLUMINE AND DIATRIZOATE SODIUM 15 ML: 660; 100 LIQUID ORAL; RECTAL at 10:30

## 2025-03-24 ENCOUNTER — OFFICE VISIT (OUTPATIENT)
Dept: ONCOLOGY | Age: 50
End: 2025-03-24
Payer: COMMERCIAL

## 2025-03-24 ENCOUNTER — HOSPITAL ENCOUNTER (OUTPATIENT)
Dept: LAB | Age: 50
Discharge: HOME | End: 2025-03-24
Payer: COMMERCIAL

## 2025-03-24 VITALS
HEIGHT: 61 IN | SYSTOLIC BLOOD PRESSURE: 108 MMHG | RESPIRATION RATE: 16 BRPM | HEART RATE: 78 BPM | OXYGEN SATURATION: 97 % | DIASTOLIC BLOOD PRESSURE: 65 MMHG | BODY MASS INDEX: 20.79 KG/M2 | TEMPERATURE: 98.1 F | WEIGHT: 110.1 LBS

## 2025-03-24 DIAGNOSIS — A04.72 C. DIFFICILE DIARRHEA: ICD-10-CM

## 2025-03-24 DIAGNOSIS — Z85.048 HISTORY OF RECTAL CANCER: ICD-10-CM

## 2025-03-24 DIAGNOSIS — Z85.048 HISTORY OF RECTAL CANCER: Primary | ICD-10-CM

## 2025-03-24 LAB
ALBUMIN SERPL-MCNC: 4.2 G/DL (ref 3.5–5)
ALBUMIN/GLOB SERPL: 1.6 (ref 1–1.9)
ALP SERPL-CCNC: 68 U/L (ref 35–104)
ALT SERPL-CCNC: 10 U/L (ref 8–45)
ANION GAP SERPL CALC-SCNC: 9 MMOL/L (ref 7–16)
AST SERPL-CCNC: 15 U/L (ref 15–37)
BASOPHILS # BLD: 0.02 K/UL (ref 0–0.2)
BASOPHILS NFR BLD: 0.5 % (ref 0–2)
BILIRUB SERPL-MCNC: 0.6 MG/DL (ref 0–1.2)
BUN SERPL-MCNC: 13 MG/DL (ref 6–23)
CALCIUM SERPL-MCNC: 9 MG/DL (ref 8.8–10.2)
CEA SERPL-MCNC: 3.4 NG/ML (ref 0–3.8)
CHLORIDE SERPL-SCNC: 104 MMOL/L (ref 98–107)
CO2 SERPL-SCNC: 27 MMOL/L (ref 20–29)
CREAT SERPL-MCNC: 0.52 MG/DL (ref 0.6–1.1)
DIFFERENTIAL METHOD BLD: ABNORMAL
EOSINOPHIL # BLD: 0.06 K/UL (ref 0–0.8)
EOSINOPHIL NFR BLD: 1.6 % (ref 0.5–7.8)
ERYTHROCYTE [DISTWIDTH] IN BLOOD BY AUTOMATED COUNT: 13.2 % (ref 11.9–14.6)
GLOBULIN SER CALC-MCNC: 2.7 G/DL (ref 2.3–3.5)
GLUCOSE SERPL-MCNC: 105 MG/DL (ref 70–99)
HCT VFR BLD AUTO: 40.1 % (ref 35.8–46.3)
HGB BLD-MCNC: 13.2 G/DL (ref 11.7–15.4)
IMM GRANULOCYTES # BLD AUTO: 0.01 K/UL (ref 0–0.5)
IMM GRANULOCYTES NFR BLD AUTO: 0.3 % (ref 0–5)
LYMPHOCYTES # BLD: 1.25 K/UL (ref 0.5–4.6)
LYMPHOCYTES NFR BLD: 32.7 % (ref 13–44)
MCH RBC QN AUTO: 30.6 PG (ref 26.1–32.9)
MCHC RBC AUTO-ENTMCNC: 32.9 G/DL (ref 31.4–35)
MCV RBC AUTO: 93 FL (ref 82–102)
MONOCYTES # BLD: 0.24 K/UL (ref 0.1–1.3)
MONOCYTES NFR BLD: 6.3 % (ref 4–12)
NEUTS SEG # BLD: 2.24 K/UL (ref 1.7–8.2)
NEUTS SEG NFR BLD: 58.6 % (ref 43–78)
NRBC # BLD: 0 K/UL (ref 0–0.2)
PLATELET # BLD AUTO: 140 K/UL (ref 150–450)
PMV BLD AUTO: 11.4 FL (ref 9.4–12.3)
POTASSIUM SERPL-SCNC: 4.1 MMOL/L (ref 3.5–5.1)
PROT SERPL-MCNC: 6.8 G/DL (ref 6.3–8.2)
RBC # BLD AUTO: 4.31 M/UL (ref 4.05–5.2)
SODIUM SERPL-SCNC: 140 MMOL/L (ref 136–145)
WBC # BLD AUTO: 3.8 K/UL (ref 4.3–11.1)

## 2025-03-24 PROCEDURE — 36415 COLL VENOUS BLD VENIPUNCTURE: CPT

## 2025-03-24 PROCEDURE — 99214 OFFICE O/P EST MOD 30 MIN: CPT | Performed by: INTERNAL MEDICINE

## 2025-03-24 PROCEDURE — 80053 COMPREHEN METABOLIC PANEL: CPT

## 2025-03-24 PROCEDURE — 85025 COMPLETE CBC W/AUTO DIFF WBC: CPT

## 2025-03-24 PROCEDURE — 82378 CARCINOEMBRYONIC ANTIGEN: CPT

## 2025-03-24 RX ORDER — HYDROCORTISONE 25 MG/G
CREAM TOPICAL AS NEEDED
COMMUNITY

## 2025-03-24 NOTE — PROGRESS NOTES
[facility]: Office Visit Progress Note    Chief Complaint:    Rectal cancer    History of Present Illness:  Reason for Referral: Rectal cancer     Referring Provider:  Lenora Gruber MD     Primary Care Provider: Min King DO     Family History of Cancer/Hematologic Disorders: None noted     Presenting Symptoms: LLQ abdominal pain     Ms. Ballard is a 49 y.o.  female who reports to have never used tobacco products. She denies use of alcohol or drug substances. Her medical history reports as rectal bleeding. Her surgical history reports as  and colonoscopy.  On 7/15/22, Ms. Ballard saw Dr Gruber for her newly diagnosed rectal cancer. She reported to have had rectal bleeding for 2 months which prompted her first colonoscopy. A 5 cm half circumferential friable mass was identified at the most proximal valve of Singh, biopsy revealed at least carcinoma in situ. 2022 CT scan of c/a/p demonstrated no distal metastases. She reported intermittent left lower quadrant abdominal pain for years. She has EUS scheduled for August however Dr Gruber reached out to Dr Davenport to move up her planned EUS. A referral was placed to hematology for consideration of neoadjuvant chemoradiation. Currently there is no referral to radiation oncology. Her 2022 CBC and CMP reported WNL except for a low creatinine of 0.47.     22 Colonoscopy         2022 Surgical Pathology  DIAGNOSIS  A:  \"RECTAL MASS BIOPSIES\":        FRAGMENTS OF AT LEAST IN SITU ADENOCARCINOMA.  SEE COMMENT.   COMMENT: BECAUSE BIOPSY FRAGMENTS ARE LIMITED PRIMARIALY TO MUCOSA,   SUBMUCOSAL INFILTRATION CANNOT BE RULED OUT   B:  \"RANDOM COLON BIOPSIES\":        FRAGMENTS OF HISTOLOGICALLY UNREMARKABLE LARGE INTESTINE   C:  \"ASCENDING COLON POLYP\":        FRAGMENT OF MIXED TUBULOVILLOUS ADENOMA     22 CT Chest Abdomen and Pelvis  FINDINGS:  Lungs: No pulmonary nodules. No airspace disease.  Airways: Trachea and proximal bronchi grossly

## 2025-03-24 NOTE — PATIENT INSTRUCTIONS
Patient Information from Today's Visit    The members of your [facility] are listed below:    Physician Provider: Celeste Geiger Medical Oncologist  Advanced Practice Clinician: Елена Sloan NP  Registered Nurse: Roula MOSS   Navigator: N/A  Medical Assistant: Ely QUINTANA MA  : [place] CASEY   Supportive Care Services: Kerri GONZALES LMSW    Diagnosis: rectal cancer      Follow Up Instructions:     CT reviewed.    Follow up in 1 year with CT prior. Call radiology [phone] to arrange this about 1 week prior to your office visit.    Treatment Summary has been discussed and given to patient:N/A      Current Labs:   Hospital Outpatient Visit on 03/24/2025   Component Date Value Ref Range Status    WBC 03/24/2025 3.8 (L)  4.3 - 11.1 K/uL Final    RBC 03/24/2025 4.31  4.05 - 5.2 M/uL Final    Hemoglobin 03/24/2025 13.2  11.7 - 15.4 g/dL Final    Hematocrit 03/24/2025 40.1  35.8 - 46.3 % Final    MCV 03/24/2025 93.0  82.0 - 102.0 FL Final    MCH 03/24/2025 30.6  26.1 - 32.9 PG Final    MCHC 03/24/2025 32.9  31.4 - 35.0 g/dL Final    RDW 03/24/2025 13.2  11.9 - 14.6 % Final    Platelets 03/24/2025 140 (L)  150 - 450 K/uL Final    MPV 03/24/2025 11.4  9.4 - 12.3 FL Final    nRBC 03/24/2025 0.00  0.0 - 0.2 K/uL Final    **Note: Absolute NRBC parameter is now reported with Hemogram**    Neutrophils % 03/24/2025 58.6  43.0 - 78.0 % Final    Lymphocytes % 03/24/2025 32.7  13.0 - 44.0 % Final    Monocytes % 03/24/2025 6.3  4.0 - 12.0 % Final    Eosinophils % 03/24/2025 1.6  0.5 - 7.8 % Final    Basophils % 03/24/2025 0.5  0.0 - 2.0 % Final    Immature Granulocytes % 03/24/2025 0.3  0.0 - 5.0 % Final    Neutrophils Absolute 03/24/2025 2.24  1.70 - 8.20 K/UL Final    Lymphocytes Absolute 03/24/2025 1.25  0.50 - 4.60 K/UL Final    Monocytes Absolute 03/24/2025 0.24  0.10 - 1.30 K/UL Final    Eosinophils Absolute 03/24/2025 0.06  0.00 - 0.80 K/UL Final    Basophils Absolute 03/24/2025 0.02  0.00 - 0.20 K/UL

## (undated) DEVICE — BLADE CLIPPER GEN PURP NS

## (undated) DEVICE — GAUZE,SPONGE,4"X4",12PLY,WOVEN,NS,LF: Brand: MEDLINE

## (undated) DEVICE — SUTURE PDS II SZ 0 L60IN ABSRB VLT L65MM TP-1 1/2 CIR Z991G

## (undated) DEVICE — Device: Brand: SPOT EX ENDOSCOPIC TATTOO

## (undated) DEVICE — GLOVE ORANGE PI 7 1/2   MSG9075

## (undated) DEVICE — STAPLER INT L34CM 60MM LNG ENDOSCP ARTC PWR + ECHELON FLX

## (undated) DEVICE — TROCAR: Brand: KII® SLEEVE

## (undated) DEVICE — SOLUTION ANTIFOG VIS SYS CLEARIFY LAPSCP

## (undated) DEVICE — SYRINGE, LUER SLIP, STERILE, 60ML: Brand: MEDLINE

## (undated) DEVICE — YANKAUER,BULB TIP,W/O VENT,RIGID,STERILE: Brand: MEDLINE

## (undated) DEVICE — Device

## (undated) DEVICE — TUBING INSUFFLATION SMK EVAC HI FLO SET PNEUMOCLEAR

## (undated) DEVICE — SINGLE PORT MANIFOLD: Brand: NEPTUNE 2

## (undated) DEVICE — SEALER LAP L37CM MARYLAND JAW OPN NANO COAT MULTIFUNCTIONAL

## (undated) DEVICE — RELOAD STPL L60MM H1.5-3.6MM REG TISS BLU GRIPPING SURF B

## (undated) DEVICE — SUTURE PERMAHAND SZ 3-0 L18IN NONABSORBABLE BLK L26MM SH C013D

## (undated) DEVICE — SYRINGE MED 3ML CLR PLAS STD N CTRL LUERLOCK TIP DISP

## (undated) DEVICE — STAPLER INT L28CM DIA29MM CLS STPL H10-2.5MM OPN LEG L5.5MM

## (undated) DEVICE — GLOVE SURG SZ 6.5 L11.2IN THK8.6MIL LT BRN LTX FREE

## (undated) DEVICE — TROCAR: Brand: KII FIOS FIRST ENTRY

## (undated) DEVICE — SCOPE RECT LED W INSUFFLATOR

## (undated) DEVICE — LUBE JELLY FOIL PACK 1.4 OZ: Brand: MEDLINE INDUSTRIES, INC.

## (undated) DEVICE — PAD PT POS 36 IN SURGYPAD DISP

## (undated) DEVICE — GRASPER ENDOSCP TIP L10MM ANVIL ROT RATCH HNDL DISP

## (undated) DEVICE — BANDAGE COMPR XL KNEE ELBW TAN TUBIGRIP ARTHRO-PAD LTX

## (undated) DEVICE — SPONGE LAPAROTOMY W18XL18IN WHITE STRUNG RADIOPAQUE STERILE

## (undated) DEVICE — AIRLIFE™ OXYGEN TUBING 7 FEET (2.1 M) CRUSH RESISTANT OXYGEN TUBING, VINYL TIPPED: Brand: AIRLIFE™

## (undated) DEVICE — SOLUTION IRRIGATION STRL H2O 3000 ML 4/CA

## (undated) DEVICE — DRAIN SURG 19FR 100% SIL RADPQ RND CHN FULL FLUT

## (undated) DEVICE — SUTURE PERMAHAND SZ 2-0 L12X18IN NONABSORBABLE BLK SILK A185H

## (undated) DEVICE — NEEDLE HYPO 25GA L1.5IN BLU POLYPR HUB S STL REG BVL STR

## (undated) DEVICE — ACHIEVE NEEDLE BIOP SOFT TISSUE 18GX6CM

## (undated) DEVICE — KENDALL RADIOLUCENT FOAM MONITORING ELECTRODE RECTANGULAR SHAPE: Brand: KENDALL

## (undated) DEVICE — CANNULA NSL ORAL AD FOR CAPNOFLEX CO2 O2 AIRLFE

## (undated) DEVICE — LEGGINGS, PAIR, 31X48, STERILE: Brand: MEDLINE

## (undated) DEVICE — SYRINGE MED 10ML LUERLOCK TIP W/O SFTY DISP

## (undated) DEVICE — GLOVE SURG SZ 65 L12IN FNGR THK79MIL GRN LTX FREE

## (undated) DEVICE — RELOAD STPL H2.6X0.75MM DIA60MM GRY MESENTERY/THIN TISS NAT

## (undated) DEVICE — SOLUTION IRRIG 3000ML H2O STRL BAG

## (undated) DEVICE — MEDI-VAC NON-CONDUCTIVE SUCTION TUBING: Brand: CARDINAL HEALTH

## (undated) DEVICE — GARMENT,MEDLINE,DVT,INT,CALF,MED, GEN2: Brand: MEDLINE

## (undated) DEVICE — PUMP SUC IRR TBNG L10FT W/ HNDPC ASSEMB STRYKEFLOW 2

## (undated) DEVICE — SUTURE SZ 0 27IN 5/8 CIR UR-6  TAPER PT VIOLET ABSRB VICRYL J603H

## (undated) DEVICE — SPONGE GZ W4XL4IN COT 12 PLY TYP VII WVN C FLD DSGN STERILE

## (undated) DEVICE — APPLICATOR MEDICATED 26 CC SOLUTION HI LT ORNG CHLORAPREP

## (undated) DEVICE — CONNECTOR TBNG OD5-7MM O2 END DISP

## (undated) DEVICE — TROCARS: Brand: KII® BLUNT TIP ACCESS SYSTEM

## (undated) DEVICE — STAPLER EXT 65MM S STL AUTO DISP PURSTRING

## (undated) DEVICE — NEEDLE SYR 18GA L1.5IN RED PLAS HUB S STL BLNT FILL W/O

## (undated) DEVICE — GOWN,SIRUS,NONRNF,SETINSLV,XL,20/CS: Brand: MEDLINE

## (undated) DEVICE — RELOAD STPL L60MM H1-2.6MM MESENTERY THN TISS WHT 6 ROW

## (undated) DEVICE — RESERVOIR,SUCTION,100CC,SILICONE: Brand: MEDLINE

## (undated) DEVICE — WOUND RETRACTOR AND PROTECTOR: Brand: ALEXIS O WOUND PROTECTOR-RETRACTOR

## (undated) DEVICE — PREMIUM WET SKIN PREP TRAY: Brand: MEDLINE INDUSTRIES, INC.

## (undated) DEVICE — THE TORRENT IRRIGATION TUBING IS INTENDED TO PROVIDE IRRIGATION VIA IRRIGATION FLUIDS, SUCH AS STERILE WATER, DURING GASTROINTESTINAL ENDOSCOPIC PROCEDURES WHEN USED IN CONJUNCTION WITH AN IRRIGATION PUMP OR ELECTROSURGICAL UNIT.: Brand: TORRENT

## (undated) DEVICE — GENERAL LAPAROSCOPY: Brand: MEDLINE INDUSTRIES, INC.

## (undated) DEVICE — TRAY CATHETER 16FR F INCLUDE LUB URIN M STATLOK STBL DEV SURSTP